# Patient Record
Sex: FEMALE | Race: WHITE | HISPANIC OR LATINO | Employment: FULL TIME | URBAN - METROPOLITAN AREA
[De-identification: names, ages, dates, MRNs, and addresses within clinical notes are randomized per-mention and may not be internally consistent; named-entity substitution may affect disease eponyms.]

---

## 2018-07-12 RX ORDER — ELECTROLYTES/DEXTROSE
1 SOLUTION, ORAL ORAL DAILY
COMMUNITY

## 2018-07-12 RX ORDER — FLUTICASONE PROPIONATE 50 MCG
2 SPRAY, SUSPENSION (ML) NASAL AS NEEDED
COMMUNITY

## 2018-07-13 ENCOUNTER — OFFICE VISIT (OUTPATIENT)
Dept: FAMILY MEDICINE CLINIC | Facility: CLINIC | Age: 38
End: 2018-07-13
Payer: COMMERCIAL

## 2018-07-13 VITALS
SYSTOLIC BLOOD PRESSURE: 108 MMHG | OXYGEN SATURATION: 98 % | RESPIRATION RATE: 18 BRPM | HEIGHT: 65 IN | DIASTOLIC BLOOD PRESSURE: 66 MMHG | WEIGHT: 271.2 LBS | HEART RATE: 78 BPM | TEMPERATURE: 97.6 F | BODY MASS INDEX: 45.18 KG/M2

## 2018-07-13 DIAGNOSIS — I83.93 VARICOSE VEINS OF BOTH LOWER EXTREMITIES: Primary | ICD-10-CM

## 2018-07-13 DIAGNOSIS — Z02.4 DRIVER'S PERMIT PHYSICAL EXAMINATION: ICD-10-CM

## 2018-07-13 DIAGNOSIS — I83.90 VARICOSE VEIN OF LEG: ICD-10-CM

## 2018-07-13 PROCEDURE — 99213 OFFICE O/P EST LOW 20 MIN: CPT | Performed by: FAMILY MEDICINE

## 2018-07-13 NOTE — PATIENT INSTRUCTIONS
I completed her 's physical exam and I completed the 's form for her  Referral to vascular for consultation about her varicose veins  She goes to the gym regularly and is trying to lose weight

## 2018-07-13 NOTE — PROGRESS NOTES
Assessment/Plan:    Varicose veins of both lower extremities  Long term issue -- wants referral to Vascular doctor  Diagnoses and all orders for this visit:    Varicose veins of both lower extremities    's permit physical examination    Varicose vein of leg    Other orders  -     fluticasone (FLONASE ALLERGY RELIEF) 50 mcg/act nasal spray; 2 sprays into each nostril 2 (two) times a day  -     Multiple Vitamins-Minerals (MULTIVITAMIN ADULT) TABS; Take 1 tablet by mouth daily          Subjective:     Chief Complaint   Patient presents with    Physical Exam    Varicose Veins        Patient ID: Gara Crigler is a 42 y o  female  HPI    The following portions of the patient's history were reviewed and updated as appropriate: allergies, current medications, past family history, past medical history, past social history, past surgical history and problem list     Review of Systems   Constitutional: Negative for activity change, appetite change, fatigue, fever and unexpected weight change  HENT: Negative for congestion, dental problem and sneezing  Eyes: Negative for discharge and visual disturbance  Respiratory: Negative for cough and wheezing  Gastrointestinal: Negative for abdominal pain, constipation, diarrhea, nausea and vomiting  Endocrine: Negative for polydipsia and polyuria  Genitourinary: Negative for dysuria and frequency  Musculoskeletal: Negative for arthralgias  Skin: Negative for rash  Allergic/Immunologic: Negative for environmental allergies and food allergies  Neurological: Negative for headaches  Hematological: Negative for adenopathy  Psychiatric/Behavioral: Negative for behavioral problems and sleep disturbance           Objective:  Vitals:    07/13/18 0732   BP: 108/66   BP Location: Left arm   Patient Position: Sitting   Cuff Size: Large   Pulse: 78   Resp: 18   Temp: 97 6 °F (36 4 °C)   TempSrc: Temporal   SpO2: 98%   Weight: 123 kg (271 lb 3 2 oz)   Height: 5' 5" (1 651 m)      Physical Exam   Constitutional: She is oriented to person, place, and time  She appears well-developed and well-nourished  HENT:   Head: Normocephalic  Right Ear: External ear normal    Left Ear: External ear normal    Nose: Nose normal    Eyes: Conjunctivae are normal  Pupils are equal, round, and reactive to light  Right eye exhibits no discharge  Left eye exhibits no discharge  Neck: Normal range of motion  Neck supple  No thyromegaly present  Cardiovascular: Normal rate, regular rhythm and normal heart sounds  No murmur heard  Pulmonary/Chest: Effort normal and breath sounds normal    Abdominal: Soft  Bowel sounds are normal  There is no tenderness  Musculoskeletal: Normal range of motion  Lymphadenopathy:     She has no cervical adenopathy  Neurological: She is alert and oriented to person, place, and time  Skin: Skin is warm  No rash noted  Psychiatric: She has a normal mood and affect   Her behavior is normal

## 2018-08-21 NOTE — ASSESSMENT & PLAN NOTE
Longstanding hx of BLE symptomatic varicose veins, L >R  -recommend 3 month trial of conservative measures to include daily use of compression stockings, LE elevation, low-sodium diet, aerobic exercise and skin moisturization  -JESSICA in 3 months  -return to office with surgeon with B[facility] Molina in 3 months for reassessment and discussion of surgical options  -instructed to contact the office in the interim with any questions, concerns or change in symptoms

## 2018-08-22 ENCOUNTER — CONSULT (OUTPATIENT)
Dept: VASCULAR SURGERY | Facility: CLINIC | Age: 38
End: 2018-08-22
Payer: COMMERCIAL

## 2018-08-22 VITALS
SYSTOLIC BLOOD PRESSURE: 120 MMHG | BODY MASS INDEX: 43.99 KG/M2 | HEIGHT: 65 IN | DIASTOLIC BLOOD PRESSURE: 74 MMHG | TEMPERATURE: 98 F | HEART RATE: 60 BPM | WEIGHT: 264 LBS

## 2018-08-22 DIAGNOSIS — I83.93 VARICOSE VEINS OF BOTH LOWER EXTREMITIES: Primary | ICD-10-CM

## 2018-08-22 DIAGNOSIS — E66.01 CLASS 3 SEVERE OBESITY DUE TO EXCESS CALORIES WITHOUT SERIOUS COMORBIDITY WITH BODY MASS INDEX (BMI) OF 40.0 TO 44.9 IN ADULT (HCC): ICD-10-CM

## 2018-08-22 DIAGNOSIS — I83.90 VARICOSE VEIN OF LEG: ICD-10-CM

## 2018-08-22 PROCEDURE — 99244 OFF/OP CNSLTJ NEW/EST MOD 40: CPT | Performed by: PHYSICIAN ASSISTANT

## 2018-08-22 RX ORDER — LORATADINE 10 MG/1
TABLET ORAL
COMMUNITY

## 2018-08-22 NOTE — LETTER
August 22, 2018     Syeda Ignacio MD  [address] [address]    Patient: Liliya Dunaway   YOB: 1980   Date of Visit: 8/22/2018       Dear Dr Andrew Rajput:    Thank you for referring Liliya Dunaway to me for evaluation  Below are my notes for this consultation  If you have questions, please do not hesitate to call me  I look forward to following your patient along with you  Sincerely,        Sherita Galeano PA-C        CC: No Recipients  Sherita Galeano [place]  8/22/2018 11:26 AM  Cosign Needed  Varicose veins of both lower extremities  Longstanding hx of BLE symptomatic varicose veins, L >R  -recommend 3 month trial of conservative measures to include daily use of compression stockings, LE elevation, low-sodium diet, aerobic exercise and skin moisturization  -LEVDR in 3 months  -return to office with surgeon with Eliza Morales in 3 months for reassessment and discussion of surgical options  -instructed to contact the office in the interim with any questions, concerns or change in symptoms    Obesity  S/p gastric bypass surgery  -continue weight loss management to facilitate treatment of varicose veins      Assessment/Plan   Diagnoses and all orders for this visit:    Varicose veins of both lower extremities  -     VAS reflux lower limb venous duplex study with reflux assessment, complete bilateral; Future  -     Compression Stocking    Varicose vein of leg  -     Ambulatory referral to Vascular Surgery    Class 3 severe obesity due to excess calories without serious comorbidity with body mass index (BMI) of 40 0 to 44 9 in [facility])    Other orders  -     loratadine (CLARITIN) 10 mg tablet; Take by mouth        Chief Complaint   Patient presents with    Varicose Veins       Subjective  " I am here for varicose veins "    Patient ID: Liliya Dunaway is a 42 y o  female  The patient is new to our practice and was referred by Syeda Ignacio MD  She has had no testing   Patient complains of pain in legs along with bulging veins, aching, tiredness, heaviness and burning  She also complains of itching and swelling in her legs  Left leg is worse than right and has been experiencing this for about 21 years  She exercises and elevates legs but does not wear compression stockings  She denies any DVT history or bleeding veins  76-year-old female with a history morbid obesity, s/p Aurora-en-Y gastric bypass and longstanding symptomatic BLE varicose veins who is referred to the office by her PCP for evaluation related to her varicose veins  Patient complains of progressive bilateral lower extremity varicosities since the age of 23  She complains of bilateral lower extremity dull achy pain, heaviness and edema, L >R  Patient denies history of PE, DVT, superficial thrombophlebitis, recurrent lower extremity cellulitis, venous ulcerations, venous stasis dermatitis, bleeding varicosities or hypercoagulable disorder  She reports a family history of varicose veins in her mother with associated phlebitis but denies family history of DVT, PE or hypercoagulable disorder  The patient works remotely from home and sits for prolonged periods of time which exacerbates her symptoms  Her symptoms are also exacerbated by standing for prolonged periods of time  The patient does not wear compression stockings on a regular basis  She attempted to use compression in the past but discontinued due significant posterior knee pain  The following portions of the patient's history were reviewed and updated as appropriate: allergies, current medications, past family history, past medical history, past social history, past surgical history and problem list     Review of Systems   Constitutional: Positive for chills  Negative for fatigue and fever  HENT: Negative  Eyes: Negative  Respiratory: Negative for chest tightness and shortness of breath  Cardiovascular: Positive for leg swelling   Negative for chest pain         Painful veins   Gastrointestinal: Negative  Endocrine: Negative  Genitourinary: Negative  Musculoskeletal: Negative for back pain, neck pain and neck stiffness  Skin: Negative for rash and wound  Allergic/Immunologic: Negative  Neurological: Negative  Hematological: Negative  Psychiatric/Behavioral: Negative  Patient Active Problem List   Diagnosis    Umbilical hernia    Varicose veins of both lower extremities    Varicose vein of leg    Obesity       Past Surgical History:   Procedure Laterality Date    COLONOSCOPY  02/2012    GASTRIC BYPASS  2012    GASTRIC BYPASS      MORBID OBESITY   ROU-EN-Y GASTRIC BY PASS WITH 150 CM    HERNIA REPAIR      OTHER SURGICAL HISTORY  11/2007    PLANTAR FASCITIS       Family History   Problem Relation Age of Onset    Hypertension Mother     Diabetes Father     Hypertension Father     Coronary artery disease Father         PREMATURE    Colon cancer Maternal Aunt     Breast cancer Paternal Aunt     Diabetes Family         NOT SPECIFIED    Diabetes Family         NOT SPECIFIED       Social History     Social History    Marital status: /Civil Union     Spouse name: N/A    Number of children: N/A    Years of education: N/A     Occupational History    Not on file       Social History Main Topics    Smoking status: Former Smoker     Quit date: 7/1/2004    Smokeless tobacco: Never Used      Comment: USED CHANTIX TO QUIT SMOKING 2003    Alcohol use Yes      Comment: social    Drug use: No    Sexual activity: Not on file     Other Topics Concern    Not on file     Social History Narrative    [employer] NEVER SMOKER       Allergies   Allergen Reactions    Amoxicillin Rash         Current Outpatient Prescriptions:     fluticasone (FLONASE ALLERGY RELIEF) 50 mcg/act nasal spray, 2 sprays into each nostril 2 (two) times a day, Disp: , Rfl:     loratadine (CLARITIN) 10 mg tablet, Take by mouth, Disp: , Rfl:    Multiple Vitamins-Minerals (MULTIVITAMIN ADULT) TABS, Take 1 tablet by mouth daily, Disp: , Rfl:     Objective     Imaging studies:  No vascular imaging studies for review    Physical Exam:    General appearance: alert and oriented, in no acute distress  Skin: Skin color, texture, turgor normal  No rashes or lesions  Neurologic: Grossly normal  Head: Normocephalic, without obvious abnormality, atraumatic  Eyes: PERRL, EOMI, sclerae nonicteric  Throat: lips, mucosa, and tongue normal; teeth and gums normal  Neck: no adenopathy, no carotid bruit, no JVD, supple, symmetrical, trachea midline and thyroid not enlarged, symmetric, no tenderness/mass/nodules  Back: symmetric, no curvature  ROM normal  No CVA tenderness  Lungs: clear to auscultation bilaterally  Chest wall: no tenderness  Heart: regular rate and rhythm, S1, S2 normal, no murmur, click, rub or gallop  Abdomen: soft, non-tender; bowel sounds normal; no masses,  no organomegaly  Extremities: extremities normal, warm and well-perfused; no cyanosis, clubbing, or edema, varicose veins noted and Multiple, diffuse reticular and truncal varicosities noted bilateral lower extremity, particularly posterior calf and medial distal thigh    No evidence of venous ulceration, hemosiderin staining, stasis dermatitis or cellulitis    Pulse exam:  Radial: Right: 2+ Left[de-identified] 2+  DP: Right: 2+ Left: 2+  PT: Right: 2+ Left: 2+

## 2018-08-22 NOTE — PROGRESS NOTES
Varicose veins of both lower extremities  Longstanding hx of BLE symptomatic varicose veins, L >R  -recommend 3 month trial of conservative measures to include daily use of compression stockings, LE elevation, low-sodium diet, aerobic exercise and skin moisturization  -LEVDR in 3 months  -return to office with surgeon with Romina Potts in 3 months for reassessment and discussion of surgical options  -instructed to contact the office in the interim with any questions, concerns or change in symptoms    Obesity  S/p gastric bypass surgery  -continue weight loss management to facilitate treatment of varicose veins      Assessment/Plan   Diagnoses and all orders for this visit:    Varicose veins of both lower extremities  -     VAS reflux lower limb venous duplex study with reflux assessment, complete bilateral; Future  -     Compression Stocking    Varicose vein of leg  -     Ambulatory referral to Vascular Surgery    Class 3 severe obesity due to excess calories without serious comorbidity with body mass index (BMI) of 40 0 to 44 9 in [facility])    Other orders  -     loratadine (CLARITIN) 10 mg tablet; Take by mouth        Chief Complaint   Patient presents with    Varicose Veins       Subjective  " I am here for varicose veins "    Patient ID: Mica Robles is a 42 y o  female  The patient is new to our practice and was referred by Miguelina Koroma MD  She has had no testing  Patient complains of pain in legs along with bulging veins, aching, tiredness, heaviness and burning  She also complains of itching and swelling in her legs  Left leg is worse than right and has been experiencing this for about 21 years  She exercises and elevates legs but does not wear compression stockings  She denies any DVT history or bleeding veins       38-year-old female with a history morbid obesity, s/p Aurora-en-Y gastric bypass and longstanding symptomatic BLE varicose veins who is referred to the office by her PCP for evaluation related to her varicose veins  Patient complains of progressive bilateral lower extremity varicosities since the age of 23  She complains of bilateral lower extremity dull achy pain, heaviness and edema, L >R  Patient denies history of PE, DVT, superficial thrombophlebitis, recurrent lower extremity cellulitis, venous ulcerations, venous stasis dermatitis, bleeding varicosities or hypercoagulable disorder  She reports a family history of varicose veins in her mother with associated phlebitis but denies family history of DVT, PE or hypercoagulable disorder  The patient works remotely from home and sits for prolonged periods of time which exacerbates her symptoms  Her symptoms are also exacerbated by standing for prolonged periods of time  The patient does not wear compression stockings on a regular basis  She attempted to use compression in the past but discontinued due significant posterior knee pain  The following portions of the patient's history were reviewed and updated as appropriate: allergies, current medications, past family history, past medical history, past social history, past surgical history and problem list     Review of Systems   Constitutional: Positive for chills  Negative for fatigue and fever  HENT: Negative  Eyes: Negative  Respiratory: Negative for chest tightness and shortness of breath  Cardiovascular: Positive for leg swelling  Negative for chest pain  Painful veins   Gastrointestinal: Negative  Endocrine: Negative  Genitourinary: Negative  Musculoskeletal: Negative for back pain, neck pain and neck stiffness  Skin: Negative for rash and wound  Allergic/Immunologic: Negative  Neurological: Negative  Hematological: Negative  Psychiatric/Behavioral: Negative          Patient Active Problem List   Diagnosis    Umbilical hernia    Varicose veins of both lower extremities    Varicose vein of leg    Obesity       Past Surgical History: Procedure Laterality Date    COLONOSCOPY  02/2012    GASTRIC BYPASS  2012    GASTRIC BYPASS      MORBID OBESITY   ROU-EN-Y GASTRIC BY PASS WITH 150 CM    HERNIA REPAIR      OTHER SURGICAL HISTORY  11/2007    PLANTAR FASCITIS       Family History   Problem Relation Age of Onset    Hypertension Mother     Diabetes Father     Hypertension Father     Coronary artery disease Father         PREMATURE    Colon cancer Maternal Aunt     Breast cancer Paternal Aunt     Diabetes Family         NOT SPECIFIED    Diabetes Family         NOT SPECIFIED       Social History     Social History    Marital status: /Civil Union     Spouse name: N/A    Number of children: N/A    Years of education: N/A     Occupational History    Not on file       Social History Main Topics    Smoking status: Former Smoker     Quit date: 7/1/2004    Smokeless tobacco: Never Used      Comment: USED CHANTIX TO QUIT SMOKING 2003    Alcohol use Yes      Comment: social    Drug use: No    Sexual activity: Not on file     Other Topics Concern    Not on file     Social History Narrative    [employer] NEVER SMOKER       Allergies   Allergen Reactions    Amoxicillin Rash         Current Outpatient Prescriptions:     fluticasone (FLONASE ALLERGY RELIEF) 50 mcg/act nasal spray, 2 sprays into each nostril 2 (two) times a day, Disp: , Rfl:     loratadine (CLARITIN) 10 mg tablet, Take by mouth, Disp: , Rfl:     Multiple Vitamins-Minerals (MULTIVITAMIN ADULT) TABS, Take 1 tablet by mouth daily, Disp: , Rfl:     Objective     Imaging studies:  No vascular imaging studies for review    Physical Exam:    General appearance: alert and oriented, in no acute distress  Skin: Skin color, texture, turgor normal  No rashes or lesions  Neurologic: Grossly normal  Head: Normocephalic, without obvious abnormality, atraumatic  Eyes: PERRL, EOMI, sclerae nonicteric  Throat: lips, mucosa, and tongue normal; teeth and gums normal  Neck: no adenopathy, no carotid bruit, no JVD, supple, symmetrical, trachea midline and thyroid not enlarged, symmetric, no tenderness/mass/nodules  Back: symmetric, no curvature  ROM normal  No CVA tenderness  Lungs: clear to auscultation bilaterally  Chest wall: no tenderness  Heart: regular rate and rhythm, S1, S2 normal, no murmur, click, rub or gallop  Abdomen: soft, non-tender; bowel sounds normal; no masses,  no organomegaly  Extremities: extremities normal, warm and well-perfused; no cyanosis, clubbing, or edema, varicose veins noted and Multiple, diffuse reticular and truncal varicosities noted bilateral lower extremity, particularly posterior calf and medial distal thigh    No evidence of venous ulceration, hemosiderin staining, stasis dermatitis or cellulitis    Pulse exam:  Radial: Right: 2+ Left[de-identified] 2+  DP: Right: 2+ Left: 2+  PT: Right: 2+ Left: 2+

## 2018-08-22 NOTE — PATIENT INSTRUCTIONS
Varicose Veins   WHAT YOU NEED TO KNOW:   What are varicose veins? Varicose veins are veins that become large, twisted, and swollen  They are common on the back of the calves, knees, and thighs  Varicose veins are caused by valves in your veins that do not work properly  This causes blood to collect and increase pressure in the veins of your legs  The increased pressure causes your veins to stretch, get larger, swell, and twist        What increases my risk for varicose veins? · Pregnancy    · A family history of varicose veins    · Being overweight or obese    · Age 48 years or older    · Sitting or standing for long periods of time    · Wearing tight clothing  What are the signs and symptoms of varicose veins? Your symptoms may be worse after you stand or sit for long periods of time  You may have any of the following:  · Blue, purple, or bulging veins in your legs     · Pain, swelling, or muscle cramps in your legs    · Feeling of fatigue or heaviness in your legs  How are varicose veins diagnosed? Your healthcare provider will examine your legs and ask about your medical history  You may need tests, such as a Doppler ultrasound or duplex scan  These tests show your veins and valves, and how your blood is flowing through them  These tests may also show if there is a blockage or blood clot  How are varicose veins treated? The goal of treatment is to decrease symptoms, improve appearance, and prevent further problems  Treatment will depend on which veins are affected and how severe your condition is  You may need procedures to treat or remove your varicose veins  For example, your healthcare provider may inject a solution or use a laser to close the varicose veins  Surgery to remove long veins may also be done  Ask your healthcare provider for more information about procedures used to treat varicose veins  What can I do to manage my symptoms? · Do not sit or stand for long periods of time    This can cause the blood to collect in your legs and make your symptoms worse  Bend or rotate your ankles several times every hour  Walk around for a few minutes every hour to get blood moving in your legs  · Do not cross your legs when you sit  This decreases blood flow to your feet and can make your symptoms worse  · Do not wear tight clothing or shoes  Do not wear high-heeled shoes  Do not wear clothes that are tight around the waist or knees  · Maintain a healthy weight  Being overweight or obese can make your varicose veins worse  Ask your healthcare provider how much you should weigh  Ask him or her to help you create a weight loss plan if you are overweight  · Wear pressure stockings as directed  The stockings are tight and put pressure on your legs  They improve blood flow and help prevent clots  · Elevate your legs  Keep them above the level of your heart for 15 to 30 minutes several times a day  You can also prop the end of your bed up slightly to elevate your legs while you sleep  This will help blood to flow back to your heart  · Get regular exercise  Talk to your healthcare provider about the best exercise plan for you  Exercise can improve blood flow to your legs and feet  When should I seek immediate care? · You have a wound that does not heal or is infected  · You have an injury that has broken your skin and caused your varicose veins to bleed  · Your leg is swollen and hard  · You notice that your legs or feet are turning blue or black  · Your leg feels warm, tender, and painful  It may look swollen and red  When should I contact my healthcare provider? · You have pain in your leg that does not go away or gets worse  · You notice sudden large bruising on your legs  · You have a rash on your leg  · Your symptoms keep you from doing your daily activities  · You have questions or concerns about your condition or care    CARE AGREEMENT:   You have the right to help plan your care  Learn about your health condition and how it may be treated  Discuss treatment options with your caregivers to decide what care you want to receive  You always have the right to refuse treatment  The above information is an  only  It is not intended as medical advice for individual conditions or treatments  Talk to your doctor, nurse or pharmacist before following any medical regimen to see if it is safe and effective for you  © 2017 [employer]  Information is for End User's use only and may not be sold, redistributed or otherwise used for commercial purposes  All illustrations and images included in CareNotes® are the copyrighted property of [employer]  or [employer]      -recommend 3 month trial conservative measures to include daily use of compression stockings, intermittent lower extremity elevation, low-sodium diet, aerobic activity, weight management and skin moisturization  -place your compression stockings on in the morning upon awakening and removed prior to bedtime  -we will schedule you for a lower extremity venous reflux study in 3 months to assess for venous incompetence  -return to office in 3 months after reflux study for reassessment and discussion of surgical options  -please contact the office in the interim with questions, concerns or change in symptoms

## 2018-08-22 NOTE — ASSESSMENT & PLAN NOTE
S/p gastric bypass surgery  -continue weight loss management to facilitate treatment of varicose veins

## 2018-09-18 ENCOUNTER — OFFICE VISIT (OUTPATIENT)
Dept: FAMILY MEDICINE CLINIC | Facility: CLINIC | Age: 38
End: 2018-09-18
Payer: COMMERCIAL

## 2018-09-18 VITALS
RESPIRATION RATE: 18 BRPM | WEIGHT: 264.7 LBS | DIASTOLIC BLOOD PRESSURE: 80 MMHG | HEIGHT: 65 IN | HEART RATE: 60 BPM | TEMPERATURE: 99.3 F | BODY MASS INDEX: 44.1 KG/M2 | SYSTOLIC BLOOD PRESSURE: 110 MMHG

## 2018-09-18 DIAGNOSIS — H65.02 ACUTE SEROUS OTITIS MEDIA OF LEFT EAR, RECURRENCE NOT SPECIFIED: Primary | ICD-10-CM

## 2018-09-18 DIAGNOSIS — J30.9 ALLERGIC RHINITIS, UNSPECIFIED SEASONALITY, UNSPECIFIED TRIGGER: ICD-10-CM

## 2018-09-18 PROCEDURE — 99214 OFFICE O/P EST MOD 30 MIN: CPT | Performed by: NURSE PRACTITIONER

## 2018-09-18 PROCEDURE — 3008F BODY MASS INDEX DOCD: CPT | Performed by: NURSE PRACTITIONER

## 2018-09-18 RX ORDER — ALBUTEROL SULFATE 90 UG/1
2 AEROSOL, METERED RESPIRATORY (INHALATION) EVERY 6 HOURS PRN
Qty: 8.5 G | Refills: 3 | Status: SHIPPED | OUTPATIENT
Start: 2018-09-18 | End: 2021-07-21 | Stop reason: SDUPTHER

## 2018-09-18 RX ORDER — MONTELUKAST SODIUM 10 MG/1
10 TABLET ORAL
Qty: 30 TABLET | Refills: 3 | Status: SHIPPED | OUTPATIENT
Start: 2018-09-18 | End: 2021-08-25 | Stop reason: ALTCHOICE

## 2018-09-18 RX ORDER — AZITHROMYCIN 250 MG/1
TABLET, FILM COATED ORAL
Qty: 6 TABLET | Refills: 0 | Status: SHIPPED | OUTPATIENT
Start: 2018-09-18 | End: 2018-09-22

## 2018-09-18 NOTE — PROGRESS NOTES
Assessment/Plan:    No problem-specific Assessment & Plan notes found for this encounter  Diagnoses and all orders for this visit:    Acute serous otitis media of left ear, recurrence not specified  -     azithromycin (ZITHROMAX) 250 mg tablet; Take 2 tablets today then 1 tablet daily x 4 days    Allergic rhinitis, unspecified seasonality, unspecified trigger  -     montelukast (SINGULAIR) 10 mg tablet; Take 1 tablet (10 mg total) by mouth daily at bedtime  -     albuterol (PROAIR HFA) 90 mcg/act inhaler; Inhale 2 puffs every 6 (six) hours as needed for wheezing          Subjective:      Patient ID: Medardo Barros is a 42 y o  female  HPI      Started about 2 weeks ago  Head congestion and cough  Using OTC and helps but wears off  Started with low grade fevers past few days  Having ear pressure and into neck along with the cough  Does have allergies  Using meds and not controlled    Occasional tightness  The following portions of the patient's history were reviewed and updated as appropriate: allergies, current medications, past family history, past medical history, past social history, past surgical history and problem list     Review of Systems   Constitutional: Positive for fever  Negative for activity change, appetite change, chills and fatigue  HENT: Positive for congestion  Negative for ear pain, sinus pain, sinus pressure, sneezing and sore throat  Respiratory: Positive for cough  Negative for wheezing  Cardiovascular: Negative for chest pain  Gastrointestinal: Negative for constipation and diarrhea  Neurological: Negative for dizziness, light-headedness and headaches           Objective:  Vitals:    09/18/18 1116   BP: 110/80   BP Location: Left arm   Patient Position: Sitting   Cuff Size: Large   Pulse: 60   Resp: 18   Temp: 99 3 °F (37 4 °C)   TempSrc: Oral   Weight: 120 kg (264 lb 11 2 oz)   Height: 5' 5" (1 651 m)      Physical Exam   Constitutional: She is oriented to person, place, and time  She appears well-developed and well-nourished  HENT:   Head: Normocephalic  Right Ear: External ear normal    Left Ear: External ear normal    Nose: Nose normal    Eyes: Conjunctivae are normal  Pupils are equal, round, and reactive to light  Right eye exhibits no discharge  Left eye exhibits no discharge  Neck: Normal range of motion  Neck supple  No thyromegaly present  Cardiovascular: Normal rate, regular rhythm and normal heart sounds  Pulmonary/Chest: Breath sounds normal    Musculoskeletal: Normal range of motion  Lymphadenopathy:     She has no cervical adenopathy  Neurological: She is alert and oriented to person, place, and time  Skin: Skin is warm and dry  Psychiatric: She has a normal mood and affect  Her behavior is normal    Vitals reviewed

## 2018-11-21 ENCOUNTER — HOSPITAL ENCOUNTER (OUTPATIENT)
Dept: NON INVASIVE DIAGNOSTICS | Facility: CLINIC | Age: 38
Discharge: HOME | End: 2018-11-21
Payer: COMMERCIAL

## 2018-11-21 DIAGNOSIS — I83.93 VARICOSE VEINS OF BOTH LOWER EXTREMITIES: ICD-10-CM

## 2018-11-21 PROCEDURE — 93970 EXTREMITY STUDY: CPT

## 2018-11-21 PROCEDURE — 93971 EXTREMITY STUDY: CPT | Performed by: SURGERY

## 2018-11-28 ENCOUNTER — OFFICE VISIT (OUTPATIENT)
Dept: VASCULAR SURGERY | Facility: CLINIC | Age: 38
End: 2018-11-28
Payer: COMMERCIAL

## 2018-11-28 VITALS
BODY MASS INDEX: 41.82 KG/M2 | HEIGHT: 65 IN | WEIGHT: 251 LBS | DIASTOLIC BLOOD PRESSURE: 62 MMHG | TEMPERATURE: 98.4 F | SYSTOLIC BLOOD PRESSURE: 124 MMHG

## 2018-11-28 DIAGNOSIS — I83.893 SYMPTOMATIC VARICOSE VEINS OF BOTH LOWER EXTREMITIES: Primary | ICD-10-CM

## 2018-11-28 DIAGNOSIS — E66.01 CLASS 3 SEVERE OBESITY DUE TO EXCESS CALORIES WITHOUT SERIOUS COMORBIDITY WITH BODY MASS INDEX (BMI) OF 40.0 TO 44.9 IN ADULT (HCC): ICD-10-CM

## 2018-11-28 PROCEDURE — 99214 OFFICE O/P EST MOD 30 MIN: CPT | Performed by: PHYSICIAN ASSISTANT

## 2018-11-28 NOTE — PROGRESS NOTES
Symptomatic varicose veins of both lower extremities  66-year-old female with a history of obesity, status post gastric bypass surgery and Longstanding hx of BLE symptomatic varicose veins, L >R  Symptoms improved with use of compression stockings  LEVDR demonstrates no evidence of deep or superficial venous incompetence  -recommend continuation of conservative measures to include daily use of compression stockings, LE elevation, low-sodium diet, aerobic exercise and skin moisturization  -return to office PRN  -instructed to contact the office in the future with any questions, concerns, change in symptoms or recurrent thrombophlebitis  -all questions answered and patient agrees with treatment plan  Obesity  S/p gastric bypass surgery  -continue weight loss management to facilitate treatment of varicose veins      Assessment/Plan   Diagnoses and all orders for this visit:    Symptomatic varicose veins of both lower extremities    Class 3 severe obesity due to excess calories without serious comorbidity with body mass index (BMI) of 40 0 to 44 9 in [facility])        No chief complaint on file  Subjective   Patient ID: Tayler Perez is a 42 y o  female  Chief complaint: Pt is here to review LEV done 11/21/18  Pt has VV to bilateral legs  Pt c/o bulging, achy, tired, heavy and burning veins  Pt states they are itchy and swelling  L>R  Pt is wearing RX compresssion Stockings daily  Pt is elevating  Pt denies open wounds or sores  Pt denies HX of DVT  41-year-old female with a history morbid obesity, s/p [facility]-en-Y gastric bypass and longstanding symptomatic BLE varicose veins who was referred to the office by her PCP for evaluation related to her varicose veins and returns to office today for re-evaluation and review of recent [facility]  Patient complains of progressive bilateral lower extremity varicosities since the age of 23    She complains of bilateral lower extremity dull achy pain, heaviness and edema, L >R   Patient denies history of PE, DVT, superficial thrombophlebitis, recurrent lower extremity cellulitis, venous ulcerations, venous stasis dermatitis, bleeding varicosities or hypercoagulable disorder  She reports a family history of varicose veins in her mother with associated phlebitis but denies family history of DVT, PE or hypercoagulable disorder  The patient has been wearing compression stockings for the last 3 months and has noted improvement in her symptoms  LEVDR 11/21/2018 has been reviewed and demonstrates no evidence of deep or superficial venous incompetence  The following portions of the patient's history were reviewed and updated as appropriate: allergies, current medications, past family history, past medical history, past social history, past surgical history and problem list     Review of Systems   Constitutional: Negative  HENT: Negative  Eyes: Negative  Respiratory: Negative  Cardiovascular:        Painful veins   Gastrointestinal: Negative  Endocrine: Negative  Genitourinary: Negative  Musculoskeletal: Negative  Leg pain   Skin: Positive for color change  Allergic/Immunologic: Negative  Neurological: Negative  Hematological: Negative  Psychiatric/Behavioral: Negative  I have personally reviewed the ROS entered by MA and agree as documented      Patient Active Problem List   Diagnosis    Umbilical hernia    Symptomatic varicose veins of both lower extremities    Varicose veins    Obesity    Anxiety    Back pain    Gastroesophageal reflux disease    Allergic rhinitis       Past Surgical History:   Procedure Laterality Date    COLONOSCOPY  02/2012    ESOPHAGOGASTRODUODENOSCOPY  02/2012    GASTRIC BYPASS  05/23/2012    Morbid Obesity    HERNIA REPAIR      OTHER SURGICAL HISTORY  11/2007    PLANTAR FASCITIS       Family History   Problem Relation Age of Onset    Hypertension Mother     Diabetes Father         Mellitus    Hypertension Father     Coronary artery disease Father 36        Premature    Colon cancer Maternal Aunt     Breast cancer Paternal Aunt     Diabetes Family         Melitus    Diabetes Family         Mellitus       Social History     Social History    Marital status: /Civil Union     Spouse name: N/A    Number of children: N/A    Years of education: N/A     Occupational History    Not on file  Social History Main Topics    Smoking status: Former Smoker     Quit date: 7/1/2004    Smokeless tobacco: Never Used      Comment: USED CHANTIX TO QUIT SMOKING 2003  Per [employer] 10/4/16 Never a smoker    Alcohol use Yes      Comment: social    Drug use: No    Sexual activity: Not on file     Other Topics Concern    Not on file     Social History Narrative    [employer] NEVER SMOKER       Allergies   Allergen Reactions    Amoxicillin Rash         Current Outpatient Prescriptions:     albuterol (PROAIR HFA) 90 mcg/act inhaler, Inhale 2 puffs every 6 (six) hours as needed for wheezing, Disp: 8 5 g, Rfl: 3    fluticasone (FLONASE ALLERGY RELIEF) 50 mcg/act nasal spray, 2 sprays into each nostril 2 (two) times a day, Disp: , Rfl:     loratadine (CLARITIN) 10 mg tablet, Take by mouth, Disp: , Rfl:     montelukast (SINGULAIR) 10 mg tablet, Take 1 tablet (10 mg total) by mouth daily at bedtime, Disp: 30 tablet, Rfl: 3    Multiple Vitamins-Minerals (MULTIVITAMIN ADULT) TABS, Take 1 tablet by mouth daily, Disp: , Rfl:     Objective      Imaging studies:  LEVDR 11/21/2018:  Imaging study reviewed and as described above    See full report below:  FINDINGS:     Segment         Right                    Left                              Impression       AP(mm)  Impression       AP(mm)    GSV Inguinal                        7 4                      7 0    GSV Prox Thigh                      5 2                      5 4    GSV Mid Thigh                       4 2                      3 8    GSV Dist Thigh 3 0                      3 6    CFV             Normal (Patent)          Normal (Patent)            GSV Knee                            3 1                      3 8    GSV Mid Calf                        3 0                      2 2    GSV Ankle                           3 0                      3 2    SSV Mid Calf                        2 2                      2 9    SSV Knee                            3 0                      3 1    SSV Ankle                           1 3                      2 5       CONCLUSION:  Impression:  RIGHT LIMB:  No evidence of deep venous incompetence  The great saphenous vein is competent  The great saphenous vein remains within the saphenous compartment in the thigh,  with branches emanating from the proximal and mid portions  The small saphenous vein is competent and does not communicate with the  popliteal vein  The small saphenous vein confluences with multiple varicose veins in the distal  posterior calf  There is no evidence of incompetent perforators in the thigh or calf  There is no evidence of deep vein thrombosis in the CFV, the proximal PFV, the  femoral vein and the popliteal vein  LEFT LIMB:  No evidence of deep venous incompetence  The great saphenous vein is competent  The great saphenous vein remains within the saphenous compartment in the thigh,  with branches emanating from the proximal portion  The small saphenous vein is competent and does not communicate with the  popliteal vein  There is no evidence of incompetent perforators in the thigh or calf  There is no evidence of deep vein thrombosis in the CFV, the proximal PFV, the  femoral vein and the popliteal vein  Physical Exam:    General appearance: alert and oriented, in no acute distress    obese  Skin: Skin color, texture, turgor normal  No rashes or lesions  Neurologic: Grossly normal  Head: Normocephalic, without obvious abnormality, atraumatic  Eyes: PERRL, EOMI, sclerae nonicteric  Throat: lips, mucosa, and tongue normal; teeth and gums normal  Neck: no adenopathy, no carotid bruit, no JVD, supple, symmetrical, trachea midline and thyroid not enlarged, symmetric, no tenderness/mass/nodules  Back: symmetric, no curvature  ROM normal  No CVA tenderness  Lungs: clear to auscultation bilaterally  Chest wall: no tenderness  Heart: regular rate and rhythm, S1, S2 normal, no murmur, click, rub or gallop  Abdomen: soft, non-tender; bowel sounds normal; no masses,  no organomegaly  Extremities: extremities normal, warm and well-perfused; no cyanosis, clubbing, or edema, varicose veins noted and Reticular and truncal varicosities the medial and posterior calves, R>L  No evidence of venous ulceration, hemosiderin staining or open wounds      Pulse exam:  Radial: Right: 2+ Left[de-identified] 2+  DP: Right: 2+ Left: 2+  PT: Right: 1+ Left: 1+

## 2018-11-28 NOTE — LETTER
November 28, 2018     Rubi Nicole, [facility] [address] [address]    Patient: Zhen Alcantara   YOB: 1980   Date of Visit: 11/28/2018       Dear Dr Sanjuana Alejandro: Thank you for referring Zhen Alcantara to me for evaluation  Below are my notes for this consultation  If you have questions, please do not hesitate to call me  I look forward to following your patient along with you  Sincerely,        Neville Patton DO        CC: No Recipients  Arun Berumen PA-C  11/28/2018  3:56 PM  Sign at close encounter  Symptomatic varicose veins of both lower extremities  66-year-old female with a history of obesity, status post gastric bypass surgery and Longstanding hx of BLE symptomatic varicose veins, L >R  Symptoms improved with use of compression stockings  LEVDR demonstrates no evidence of deep or superficial venous incompetence  -recommend continuation of conservative measures to include daily use of compression stockings, LE elevation, low-sodium diet, aerobic exercise and skin moisturization  -return to office PRN  -instructed to contact the office in the future with any questions, concerns, change in symptoms or recurrent thrombophlebitis  -all questions answered and patient agrees with treatment plan  Obesity  S/p gastric bypass surgery  -continue weight loss management to facilitate treatment of varicose veins      Assessment/Plan   Diagnoses and all orders for this visit:    Symptomatic varicose veins of both lower extremities    Class 3 severe obesity due to excess calories without serious comorbidity with body mass index (BMI) of 40 0 to 44 9 in [facility])        No chief complaint on file  Subjective   Patient ID: Zhen Alcantara is a 42 y o  female  Chief complaint: Pt is here to review LEV done 11/21/18  Pt has VV to bilateral legs  Pt c/o bulging, achy, tired, heavy and burning veins  Pt states they are itchy and swelling  L>R   Pt is wearing RX compresssion Stockings daily  Pt is elevating  Pt denies open wounds or sores  Pt denies HX of DVT  40-year-old female with a history morbid obesity, s/p [facility]-en-Y gastric bypass and longstanding symptomatic BLE varicose veins who was referred to the office by her PCP for evaluation related to her varicose veins and returns to office today for re-evaluation and review of recent [facility]  Patient complains of progressive bilateral lower extremity varicosities since the age of 23  She complains of bilateral lower extremity dull achy pain, heaviness and edema, L >R  Patient denies history of PE, DVT, superficial thrombophlebitis, recurrent lower extremity cellulitis, venous ulcerations, venous stasis dermatitis, bleeding varicosities or hypercoagulable disorder  She reports a family history of varicose veins in her mother with associated phlebitis but denies family history of DVT, PE or hypercoagulable disorder  The patient has been wearing compression stockings for the last 3 months and has noted improvement in her symptoms  LEVDR 11/21/2018 has been reviewed and demonstrates no evidence of deep or superficial venous incompetence  The following portions of the patient's history were reviewed and updated as appropriate: allergies, current medications, past family history, past medical history, past social history, past surgical history and problem list     Review of Systems   Constitutional: Negative  HENT: Negative  Eyes: Negative  Respiratory: Negative  Cardiovascular:        Painful veins   Gastrointestinal: Negative  Endocrine: Negative  Genitourinary: Negative  Musculoskeletal: Negative  Leg pain   Skin: Positive for color change  Allergic/Immunologic: Negative  Neurological: Negative  Hematological: Negative  Psychiatric/Behavioral: Negative  I have personally reviewed the ROS entered by MA and agree as documented      Patient Active Problem List   Diagnosis    Umbilical hernia    Symptomatic varicose veins of both lower extremities    Varicose veins    Obesity    Anxiety    Back pain    Gastroesophageal reflux disease    Allergic rhinitis       Past Surgical History:   Procedure Laterality Date    COLONOSCOPY  02/2012    ESOPHAGOGASTRODUODENOSCOPY  02/2012    GASTRIC BYPASS  05/23/2012    Morbid Obesity    HERNIA REPAIR      OTHER SURGICAL HISTORY  11/2007    PLANTAR FASCITIS       Family History   Problem Relation Age of Onset    Hypertension Mother     Diabetes Father         Mellitus    Hypertension Father     Coronary artery disease Father 36        Premature    Colon cancer Maternal Aunt     Breast cancer Paternal Aunt     Diabetes Family         Melitus    Diabetes Family         Mellitus       Social History     Social History    Marital status: /Civil Union     Spouse name: N/A    Number of children: N/A    Years of education: N/A     Occupational History    Not on file  Social History Main Topics    Smoking status: Former Smoker     Quit date: 7/1/2004    Smokeless tobacco: Never Used      Comment: USED CHANTIX TO QUIT SMOKING 2003   Per [employer] 10/4/16 Never a smoker    Alcohol use Yes      Comment: social    Drug use: No    Sexual activity: Not on file     Other Topics Concern    Not on file     Social History Narrative    [employer] NEVER SMOKER       Allergies   Allergen Reactions    Amoxicillin Rash         Current Outpatient Prescriptions:     albuterol (PROAIR HFA) 90 mcg/act inhaler, Inhale 2 puffs every 6 (six) hours as needed for wheezing, Disp: 8 5 g, Rfl: 3    fluticasone (FLONASE ALLERGY RELIEF) 50 mcg/act nasal spray, 2 sprays into each nostril 2 (two) times a day, Disp: , Rfl:     loratadine (CLARITIN) 10 mg tablet, Take by mouth, Disp: , Rfl:     montelukast (SINGULAIR) 10 mg tablet, Take 1 tablet (10 mg total) by mouth daily at bedtime, Disp: 30 tablet, Rfl: 3    Multiple Vitamins-Minerals (MULTIVITAMIN ADULT) TABS, Take 1 tablet by mouth daily, Disp: , Rfl:     Objective      Imaging studies:  LEVDR 11/21/2018:  Imaging study reviewed and as described above  See full report below:  FINDINGS:     Segment         Right                    Left                              Impression       AP(mm)  Impression       AP(mm)    GSV Inguinal                        7 4                      7 0    GSV Prox Thigh                      5 2                      5 4    GSV Mid Thigh                       4 2                      3 8    GSV Dist Thigh                      3 0                      3 6    CFV             Normal (Patent)          Normal (Patent)            GSV Knee                            3 1                      3 8    GSV Mid Calf                        3 0                      2 2    GSV Ankle                           3 0                      3 2    SSV Mid Calf                        2 2                      2 9    SSV Knee                            3 0                      3 1    SSV Ankle                           1 3                      2 5       CONCLUSION:  Impression:  RIGHT LIMB:  No evidence of deep venous incompetence  The great saphenous vein is competent  The great saphenous vein remains within the saphenous compartment in the thigh,  with branches emanating from the proximal and mid portions  The small saphenous vein is competent and does not communicate with the  popliteal vein  The small saphenous vein confluences with multiple varicose veins in the distal  posterior calf  There is no evidence of incompetent perforators in the thigh or calf  There is no evidence of deep vein thrombosis in the CFV, the proximal PFV, the  femoral vein and the popliteal vein  LEFT LIMB:  No evidence of deep venous incompetence  The great saphenous vein is competent    The great saphenous vein remains within the saphenous compartment in the thigh,  with branches emanating from the proximal portion  The small saphenous vein is competent and does not communicate with the  popliteal vein  There is no evidence of incompetent perforators in the thigh or calf  There is no evidence of deep vein thrombosis in the CFV, the proximal PFV, the  femoral vein and the popliteal vein  Physical Exam:    General appearance: alert and oriented, in no acute distress  obese  Skin: Skin color, texture, turgor normal  No rashes or lesions  Neurologic: Grossly normal  Head: Normocephalic, without obvious abnormality, atraumatic  Eyes: PERRL, EOMI, sclerae nonicteric  Throat: lips, mucosa, and tongue normal; teeth and gums normal  Neck: no adenopathy, no carotid bruit, no JVD, supple, symmetrical, trachea midline and thyroid not enlarged, symmetric, no tenderness/mass/nodules  Back: symmetric, no curvature  ROM normal  No CVA tenderness  Lungs: clear to auscultation bilaterally  Chest wall: no tenderness  Heart: regular rate and rhythm, S1, S2 normal, no murmur, click, rub or gallop  Abdomen: soft, non-tender; bowel sounds normal; no masses,  no organomegaly  Extremities: extremities normal, warm and well-perfused; no cyanosis, clubbing, or edema, varicose veins noted and Reticular and truncal varicosities the medial and posterior calves, R>L  No evidence of venous ulceration, hemosiderin staining or open wounds      Pulse exam:  Radial: Right: 2+ Left[de-identified] 2+  DP: Right: 2+ Left: 2+  PT: Right: 1+ Left: 1+

## 2018-11-28 NOTE — PATIENT INSTRUCTIONS
Varicose Veins   WHAT YOU NEED TO KNOW:   What are varicose veins? Varicose veins are veins that become large, twisted, and swollen  They are common on the back of the calves, knees, and thighs  Varicose veins are caused by valves in your veins that do not work properly  This causes blood to collect and increase pressure in the veins of your legs  The increased pressure causes your veins to stretch, get larger, swell, and twist        What increases my risk for varicose veins? · Pregnancy    · A family history of varicose veins    · Being overweight or obese    · Age 48 years or older    · Sitting or standing for long periods of time    · Wearing tight clothing  What are the signs and symptoms of varicose veins? Your symptoms may be worse after you stand or sit for long periods of time  You may have any of the following:  · Blue, purple, or bulging veins in your legs     · Pain, swelling, or muscle cramps in your legs    · Feeling of fatigue or heaviness in your legs  How are varicose veins diagnosed? Your healthcare provider will examine your legs and ask about your medical history  You may need tests, such as a Doppler ultrasound or duplex scan  These tests show your veins and valves, and how your blood is flowing through them  These tests may also show if there is a blockage or blood clot  How are varicose veins treated? The goal of treatment is to decrease symptoms, improve appearance, and prevent further problems  Treatment will depend on which veins are affected and how severe your condition is  You may need procedures to treat or remove your varicose veins  For example, your healthcare provider may inject a solution or use a laser to close the varicose veins  Surgery to remove long veins may also be done  Ask your healthcare provider for more information about procedures used to treat varicose veins  What can I do to manage my symptoms? · Do not sit or stand for long periods of time    This can cause the blood to collect in your legs and make your symptoms worse  Bend or rotate your ankles several times every hour  Walk around for a few minutes every hour to get blood moving in your legs  · Do not cross your legs when you sit  This decreases blood flow to your feet and can make your symptoms worse  · Do not wear tight clothing or shoes  Do not wear high-heeled shoes  Do not wear clothes that are tight around the waist or knees  · Maintain a healthy weight  Being overweight or obese can make your varicose veins worse  Ask your healthcare provider how much you should weigh  Ask him or her to help you create a weight loss plan if you are overweight  · Wear pressure stockings as directed  The stockings are tight and put pressure on your legs  They improve blood flow and help prevent clots  · Elevate your legs  Keep them above the level of your heart for 15 to 30 minutes several times a day  You can also prop the end of your bed up slightly to elevate your legs while you sleep  This will help blood to flow back to your heart  · Get regular exercise  Talk to your healthcare provider about the best exercise plan for you  Exercise can improve blood flow to your legs and feet  When should I seek immediate care? · You have a wound that does not heal or is infected  · You have an injury that has broken your skin and caused your varicose veins to bleed  · Your leg is swollen and hard  · You notice that your legs or feet are turning blue or black  · Your leg feels warm, tender, and painful  It may look swollen and red  When should I contact my healthcare provider? · You have pain in your leg that does not go away or gets worse  · You notice sudden large bruising on your legs  · You have a rash on your leg  · Your symptoms keep you from doing your daily activities  · You have questions or concerns about your condition or care    CARE AGREEMENT:   You have the right to help plan your care  Learn about your health condition and how it may be treated  Discuss treatment options with your caregivers to decide what care you want to receive  You always have the right to refuse treatment  The above information is an  only  It is not intended as medical advice for individual conditions or treatments  Talk to your doctor, nurse or pharmacist before following any medical regimen to see if it is safe and effective for you  © 2017 [address]  Information is for End User's use only and may not be sold, redistributed or otherwise used for commercial purposes  All illustrations and images included in CareNotes® are the copyrighted property of A D A M , [employer]  or [address]  -your reflux study demonstrates no evidence of venous incompetence  -recommend continuation of conservative measures to include daily use of compression stockings, lower extremity elevation, hydration, skin moisturization, weight management aerobic activity  -recommend wearing compression stockings and hydrating well with any prolonged travel, including upcoming trip to [facility]  -return to office as needed    Please contact the office if you would develop recurrent phlebitis or like to consider stab phlebectomies

## 2018-11-28 NOTE — ASSESSMENT & PLAN NOTE
67-year-old female with a history of obesity, status post gastric bypass surgery and Longstanding hx of BLE symptomatic varicose veins, L >R  Symptoms improved with use of compression stockings  LEVDR demonstrates no evidence of deep or superficial venous incompetence  -recommend continuation of conservative measures to include daily use of compression stockings, LE elevation, low-sodium diet, aerobic exercise and skin moisturization  -return to office PRN  -instructed to contact the office in the future with any questions, concerns, change in symptoms or recurrent thrombophlebitis  -all questions answered and patient agrees with treatment plan

## 2019-05-24 ENCOUNTER — TELEPHONE (OUTPATIENT)
Dept: FAMILY MEDICINE CLINIC | Facility: CLINIC | Age: 39
End: 2019-05-24

## 2019-05-24 ENCOUNTER — OFFICE VISIT (OUTPATIENT)
Dept: URGENT CARE | Age: 39
End: 2019-05-24
Payer: COMMERCIAL

## 2019-05-24 VITALS
WEIGHT: 259 LBS | RESPIRATION RATE: 18 BRPM | OXYGEN SATURATION: 99 % | TEMPERATURE: 98.4 F | DIASTOLIC BLOOD PRESSURE: 78 MMHG | HEART RATE: 59 BPM | SYSTOLIC BLOOD PRESSURE: 138 MMHG | BODY MASS INDEX: 43.15 KG/M2 | HEIGHT: 65 IN

## 2019-05-24 DIAGNOSIS — J01.00 ACUTE NON-RECURRENT MAXILLARY SINUSITIS: ICD-10-CM

## 2019-05-24 DIAGNOSIS — J06.9 ACUTE UPPER RESPIRATORY INFECTION: Primary | ICD-10-CM

## 2019-05-24 PROCEDURE — 99213 OFFICE O/P EST LOW 20 MIN: CPT | Performed by: FAMILY MEDICINE

## 2019-05-24 RX ORDER — AZITHROMYCIN 250 MG/1
TABLET, FILM COATED ORAL
Qty: 6 TABLET | Refills: 0 | Status: SHIPPED | OUTPATIENT
Start: 2019-05-24 | End: 2019-05-28

## 2020-04-15 ENCOUNTER — TELEPHONE (OUTPATIENT)
Dept: FAMILY MEDICINE CLINIC | Facility: CLINIC | Age: 40
End: 2020-04-15

## 2020-11-25 ENCOUNTER — OFFICE VISIT (OUTPATIENT)
Dept: FAMILY MEDICINE CLINIC | Facility: CLINIC | Age: 40
End: 2020-11-25
Payer: COMMERCIAL

## 2020-11-25 VITALS
DIASTOLIC BLOOD PRESSURE: 70 MMHG | BODY MASS INDEX: 50.02 KG/M2 | HEIGHT: 64 IN | OXYGEN SATURATION: 100 % | WEIGHT: 293 LBS | HEART RATE: 72 BPM | TEMPERATURE: 97.5 F | RESPIRATION RATE: 18 BRPM | SYSTOLIC BLOOD PRESSURE: 122 MMHG

## 2020-11-25 DIAGNOSIS — Z00.00 ANNUAL PHYSICAL EXAM: Primary | ICD-10-CM

## 2020-11-25 DIAGNOSIS — E66.01 MORBID OBESITY WITH BMI OF 50.0-59.9, ADULT (HCC): ICD-10-CM

## 2020-11-25 DIAGNOSIS — Z98.84 S/P GASTRIC BYPASS: ICD-10-CM

## 2020-11-25 DIAGNOSIS — Z11.4 SCREENING FOR HIV (HUMAN IMMUNODEFICIENCY VIRUS): ICD-10-CM

## 2020-11-25 DIAGNOSIS — G47.9 SLEEP DISTURBANCES: ICD-10-CM

## 2020-11-25 DIAGNOSIS — Z12.31 ENCOUNTER FOR SCREENING MAMMOGRAM FOR MALIGNANT NEOPLASM OF BREAST: ICD-10-CM

## 2020-11-25 DIAGNOSIS — Z23 IMMUNIZATION DUE: ICD-10-CM

## 2020-11-25 DIAGNOSIS — R06.83 SNORING: ICD-10-CM

## 2020-11-25 PROCEDURE — 90471 IMMUNIZATION ADMIN: CPT

## 2020-11-25 PROCEDURE — 99396 PREV VISIT EST AGE 40-64: CPT | Performed by: NURSE PRACTITIONER

## 2020-11-25 PROCEDURE — 3725F SCREEN DEPRESSION PERFORMED: CPT | Performed by: NURSE PRACTITIONER

## 2020-11-25 PROCEDURE — 1036F TOBACCO NON-USER: CPT | Performed by: NURSE PRACTITIONER

## 2020-11-25 PROCEDURE — 90714 TD VACC NO PRESV 7 YRS+ IM: CPT

## 2020-11-25 PROCEDURE — 3008F BODY MASS INDEX DOCD: CPT | Performed by: NURSE PRACTITIONER

## 2020-11-28 LAB
EXTERNAL HIV SCREEN: NORMAL
HBA1C MFR BLD HPLC: 5.3 %

## 2020-12-08 ENCOUNTER — HOSPITAL ENCOUNTER (OUTPATIENT)
Dept: MAMMOGRAPHY | Facility: MEDICAL CENTER | Age: 40
Discharge: HOME | End: 2020-12-08
Payer: COMMERCIAL

## 2020-12-08 VITALS — HEIGHT: 64 IN | BODY MASS INDEX: 50.02 KG/M2 | WEIGHT: 293 LBS

## 2020-12-08 DIAGNOSIS — Z12.31 ENCOUNTER FOR SCREENING MAMMOGRAM FOR MALIGNANT NEOPLASM OF BREAST: ICD-10-CM

## 2020-12-08 PROCEDURE — 77063 BREAST TOMOSYNTHESIS BI: CPT

## 2020-12-08 PROCEDURE — 77067 SCR MAMMO BI INCL CAD: CPT

## 2020-12-09 ENCOUNTER — ANNUAL EXAM (OUTPATIENT)
Dept: FAMILY MEDICINE CLINIC | Facility: CLINIC | Age: 40
End: 2020-12-09
Payer: COMMERCIAL

## 2020-12-09 VITALS
RESPIRATION RATE: 16 BRPM | OXYGEN SATURATION: 99 % | HEIGHT: 64 IN | WEIGHT: 293 LBS | DIASTOLIC BLOOD PRESSURE: 72 MMHG | SYSTOLIC BLOOD PRESSURE: 110 MMHG | TEMPERATURE: 96.9 F | HEART RATE: 69 BPM | BODY MASS INDEX: 50.02 KG/M2

## 2020-12-09 DIAGNOSIS — Z12.31 ENCOUNTER FOR SCREENING MAMMOGRAM FOR MALIGNANT NEOPLASM OF BREAST: ICD-10-CM

## 2020-12-09 DIAGNOSIS — Z01.419 WELL FEMALE EXAM WITH ROUTINE GYNECOLOGICAL EXAM: Primary | ICD-10-CM

## 2020-12-09 PROCEDURE — 3008F BODY MASS INDEX DOCD: CPT | Performed by: NURSE PRACTITIONER

## 2020-12-09 PROCEDURE — 87624 HPV HI-RISK TYP POOLED RSLT: CPT | Performed by: NURSE PRACTITIONER

## 2020-12-09 PROCEDURE — 99396 PREV VISIT EST AGE 40-64: CPT | Performed by: NURSE PRACTITIONER

## 2020-12-09 PROCEDURE — G0145 SCR C/V CYTO,THINLAYER,RESCR: HCPCS | Performed by: NURSE PRACTITIONER

## 2020-12-09 PROCEDURE — 1036F TOBACCO NON-USER: CPT | Performed by: NURSE PRACTITIONER

## 2020-12-11 LAB
HPV HR 12 DNA CVX QL NAA+PROBE: NEGATIVE
HPV16 DNA CVX QL NAA+PROBE: NEGATIVE
HPV18 DNA CVX QL NAA+PROBE: NEGATIVE

## 2020-12-14 ENCOUNTER — TELEPHONE (OUTPATIENT)
Dept: FAMILY MEDICINE CLINIC | Facility: CLINIC | Age: 40
End: 2020-12-14

## 2020-12-14 LAB
LAB AP GYN PRIMARY INTERPRETATION: NORMAL
Lab: NORMAL

## 2020-12-16 ENCOUNTER — HOSPITAL ENCOUNTER (OUTPATIENT)
Dept: ULTRASOUND IMAGING | Facility: CLINIC | Age: 40
Discharge: HOME | End: 2020-12-16
Payer: COMMERCIAL

## 2020-12-16 ENCOUNTER — HOSPITAL ENCOUNTER (OUTPATIENT)
Dept: MAMMOGRAPHY | Facility: CLINIC | Age: 40
Discharge: HOME | End: 2020-12-16
Payer: COMMERCIAL

## 2020-12-16 DIAGNOSIS — R92.8 ABNORMAL MAMMOGRAM: ICD-10-CM

## 2020-12-16 PROCEDURE — G0279 TOMOSYNTHESIS, MAMMO: HCPCS

## 2020-12-16 PROCEDURE — 76642 ULTRASOUND BREAST LIMITED: CPT

## 2020-12-16 PROCEDURE — 77065 DX MAMMO INCL CAD UNI: CPT

## 2021-01-20 ENCOUNTER — OFFICE VISIT (OUTPATIENT)
Dept: SLEEP CENTER | Facility: CLINIC | Age: 41
End: 2021-01-20
Payer: COMMERCIAL

## 2021-01-20 VITALS
BODY MASS INDEX: 50.96 KG/M2 | HEART RATE: 65 BPM | DIASTOLIC BLOOD PRESSURE: 78 MMHG | HEIGHT: 64 IN | SYSTOLIC BLOOD PRESSURE: 122 MMHG

## 2021-01-20 DIAGNOSIS — G47.9 SLEEP DISTURBANCES: ICD-10-CM

## 2021-01-20 DIAGNOSIS — G47.19 EXCESSIVE DAYTIME SLEEPINESS: ICD-10-CM

## 2021-01-20 DIAGNOSIS — G47.00 INSOMNIA, UNSPECIFIED TYPE: ICD-10-CM

## 2021-01-20 DIAGNOSIS — E66.01 CLASS 3 SEVERE OBESITY DUE TO EXCESS CALORIES WITHOUT SERIOUS COMORBIDITY WITH BODY MASS INDEX (BMI) OF 50.0 TO 59.9 IN ADULT (HCC): ICD-10-CM

## 2021-01-20 DIAGNOSIS — G47.33 OBSTRUCTIVE SLEEP APNEA: Primary | ICD-10-CM

## 2021-01-20 PROCEDURE — 99244 OFF/OP CNSLTJ NEW/EST MOD 40: CPT | Performed by: NURSE PRACTITIONER

## 2021-01-20 NOTE — PATIENT INSTRUCTIONS
1   Schedule home sleep study  2  Schedule set up of CPAP equipment  3  Schedule compliance visit 31-91 days after beginning use of equipment    Nursing Support:  When: Monday through Friday 7A-5PM except holidays  Where: Our direct line is [phone]  If you are having a true emergency please call 911  In the event that the line is busy or it is after hours please leave a voice message and we will return your call  Please speak clearly, leaving your full name, birth date, best number to reach you and the reason for your call  Medication refills: We will need the name of the medication, the dosage, the ordering provider, whether you get a 30 or 90 day refill, and the pharmacy name and address  Medications will be ordered by the provider only  Nurses cannot call in prescriptions  Please allow 7 days for medication refills  Physician requested updates: If your provider requested that you call with an update after starting medication, please be ready to provide us the medication and dosage, what time you take your medication, the time you attempt to fall asleep, time you fall asleep, when you wake up, and what time you get out of bed  Sleep Study Results: We will contact you with sleep study results and/or next steps after the physician has reviewed your testing

## 2021-01-20 NOTE — PROGRESS NOTES
Consultation - [id number], 1980, MRN: [id number]    1/20/2021        Reason for Consult / Principal Problem:  Evaluation of possible Obstructive Sleep Apnea  Excessive daytime sleepiness  Insomnia - sleep maintenance  Morbid Obesity       Thank you for the opportunity of participating in the evaluation and care of this patient in the Sleep Clinic at [facility]  Subjective:     HPI: Tara Pham is a 41y o  year old female  She presents for a consultation regarding concern of possible obstructive sleep apnea  For the past 5 years, she has been waking herself several times per night, due to snoring, choking and gasping for air  She feels tired throughout the day, despite sleeping for 8-9 hours per night  She has difficulty maintaining wakefulness at times during the day, especially between 11:30am and 2:30pm   She is unable to maintain wakefulness in the evening while watching TV or reading  Her comorbid conditions include morbid obesity, GERD and anxiety, currently controlled without the use of medication      Review of Systems      Genitourinary hot flashes at night and sleep problems that vary with menstrual cycle    Cardiology none   Gastrointestinal none   Neurology muscle weakness, poor concentration or confusion,  and difficulty with memory   Constitutional weight change   Integumentary itching   Psychiatry anxiety   Musculoskeletal joint pain and leg cramps   Pulmonary shortness of breath with activity and snoring   ENT throat clearing and ringing in ears   Endocrine none   Hematological none       Employment:  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, working M-F between the hours of 9:00am and 6:00pm    Sleep Schedule:       Bedtime:  10:00pm      Latency:  Approximately 30 minutes      Wakeup time:  6:45am on work days and 7:30am on weekends, with the use of an alarm    Awakenings: Frequency: At least 2-3 times every night      Causes:  Snoring, choking, gasping for air or other unknown causes      Duration:  Sometimes returns to sleep easily and sometimes is awake for hours  She will read on her phone or just lay in bed  She has not used any medication to help with sleep initiation  Daytime Sleepiness / Inappropriate Sleep:       Most severe:  She may become sleepy by 11:30am until 2:30pm       Naps :  She does not intentionally take naps      Inappropriate drowsiness / sleep:  By 7:00pm, when she sits down to relax, she is unable to stay awake to watch TV or read     Snoring:  She snores with coughing, choking and gasping for air    Apnea: No witnessed apnea    Change in Weight:  She has gained 40 lbs over the past 1 5 years    Restless Leg Syndrome:  no clinical symptoms consistent with this diagnosis     Other Complaints:  Occasional sleep talking or screaming during sleep with no acting out of dreams  No reports of sleep walking  She has had episodes of sleep paralysis that began in her teens  Most recent episode was in 2009  No reports of bruxism but she clenches and has some jaw discomfort  She may awaken with headaches 2 times per month on average  Social History:      Caffeine:  24 ounces of coffee daily       Tobacco:   reports that she quit smoking about 16 years ago  She has never used smokeless tobacco      E-cig/Vaping:    E-Cigarette/Vaping    E-Cigarette Use Never User       E-Cigarette/Vaping Substances    Nicotine No     THC No     CBD No     Flavoring No     Other No     Unknown No          Alcohol:   reports current alcohol use  social      Drugs:   reports no history of drug use         The review of systems and following portions of the patient's history were reviewed and updated as appropriate: allergies, current medications, past family history, past medical history, past social history, past surgical history and problem list         Objective:       Vitals:    01/20/21 1412   BP: 122/78   Pulse: 65   Height: 5' 4" (1 626 m)     Body mass index is 50 96 kg/m²  Neck Circumference: 14 5  Shannon Sleepiness Scale: Total score: 13      Current Outpatient Medications:     albuterol (PROAIR HFA) 90 mcg/act inhaler, Inhale 2 puffs every 6 (six) hours as needed for wheezing (Patient taking differently: Inhale 2 puffs as needed for wheezing ), Disp: 8 5 g, Rfl: 3    fluticasone (FLONASE ALLERGY RELIEF) 50 mcg/act nasal spray, 2 sprays into each nostril as needed , Disp: , Rfl:     loratadine (CLARITIN) 10 mg tablet, Take by mouth, Disp: , Rfl:     Multiple Vitamins-Minerals (MULTIVITAMIN ADULT) TABS, Take 1 tablet by mouth daily, Disp: , Rfl:     montelukast (SINGULAIR) 10 mg tablet, Take 1 tablet (10 mg total) by mouth daily at bedtime (Patient not taking: Reported on 1/20/2021), Disp: 30 tablet, Rfl: 3    Physical Exam  General Appearance:   Alert, cooperative, no distress, appears stated age, morbidly obese     Head:   Normocephalic, without obvious abnormality, atraumatic     Eyes:   PERRL, conjunctiva/corneas clear, EOM's intact          Nose:  Nares normal, septum midline, mucosa normal, no drainage or sinus tenderness           Throat:  Lips, teeth and gums normal; tongue normal size and  shape and midline in position; mucosa moist with mild redundancy and narrow oropharyngeal opening, uvula normal, tonsils not visualized, Mallampati class 3       Neck:  Supple, symmetrical, trachea midline, no adenopathy;  Thyroid: No enlargement, tenderness or nodules; no carotid bruit or JVD     Lungs:      Clear to auscultation bilaterally, respirations unlabored     Heart:   Regular rate and rhythm, S1 and S2 normal, no murmur, rub or gallop       Extremities:  Extremities normal, atraumatic, no cyanosis and trace edema in LE bilaterally       Skin:  Skin color, texture, turgor normal, no rashes or lesions       Neurologic:  No focal deficits noted  Normal strength, sensation throughout     Sleep Study Results:  No prior sleep study      ASSESSMENT / PLAN     1  Obstructive sleep apnea  Ambulatory referral to Sleep Medicine    Home Study   2  Sleep disturbances  Ambulatory referral to Sleep Medicine    Home Study   3  Excessive daytime sleepiness  Home Study   4  Insomnia, unspecified type  Home Study   5  Class 3 severe obesity due to excess calories without serious comorbidity with body mass index (BMI) of 50 0 to 59 9 in adult [facility])           Counseling / Coordination of Care  Total clinic time spent today 55 minutes  Greater than 50% of total time was spent with the patient and / or family counseling and / or coordination of care  A description of the counseling / coordination of care:     diagnostic results, instructions for management, risk factor reductions, prognosis, patient and family education, impressions, risks and benefits of treatment options and importance of compliance with treatment    Today we discussed the anatomy and physiology of the upper airway  I pointed out how changes in this region can result in both snoring and abnormal breathing events including apneas and hypopneas  I explained the most common co-morbidities of untreated sleep apnea  After this we talked about some forms of treatment including weight loss, application of positive airway pressure, mandibular advancement devices and surgery  In order to evaluate the possibility of Obstructive Sleep Apnea as a cause of the patient's symptoms, a home sleep study will be completed to identify the presence or absence of abnormal nocturnal breathing  If significant abnormal nocturnal breathing is detected, nasal CPAP will be titrated to find the optimum pressure needed to maintain upper airway patency during sleep  This may be accomplished using APAP or may require an in lab titration study    Following testing, the patient will return to [facility] for [facility] equipment, followed by a compliance follow up visit 31-91 days later  The following instructions have been given to the patient today:    Patient Instructions   1  Schedule home sleep study  2  Schedule set up of CPAP equipment  3  Schedule compliance visit 31-91 days after beginning use of equipment    Nursing Support:  When: Monday through Friday 7A-5PM except holidays  Where: Our direct line is [phone]  If you are having a true emergency please call 911  In the event that the line is busy or it is after hours please leave a voice message and we will return your call  Please speak clearly, leaving your full name, birth date, best number to reach you and the reason for your call  Medication refills: We will need the name of the medication, the dosage, the ordering provider, whether you get a 30 or 90 day refill, and the pharmacy name and address  Medications will be ordered by the provider only  Nurses cannot call in prescriptions  Please allow 7 days for medication refills  Physician requested updates: If your provider requested that you call with an update after starting medication, please be ready to provide us the medication and dosage, what time you take your medication, the time you attempt to fall asleep, time you fall asleep, when you wake up, and what time you get out of bed  Sleep Study Results: We will contact you with sleep study results and/or next steps after the physician has reviewed your testing        Isidro Hernandez, [address] [place] [address]

## 2021-01-27 ENCOUNTER — OFFICE VISIT (OUTPATIENT)
Dept: FAMILY MEDICINE CLINIC | Facility: CLINIC | Age: 41
End: 2021-01-27
Payer: COMMERCIAL

## 2021-01-27 VITALS
RESPIRATION RATE: 18 BRPM | TEMPERATURE: 98 F | SYSTOLIC BLOOD PRESSURE: 126 MMHG | OXYGEN SATURATION: 100 % | BODY MASS INDEX: 50.02 KG/M2 | WEIGHT: 293 LBS | DIASTOLIC BLOOD PRESSURE: 82 MMHG | HEART RATE: 85 BPM | HEIGHT: 64 IN

## 2021-01-27 DIAGNOSIS — G89.29 CHRONIC LEFT SHOULDER PAIN: ICD-10-CM

## 2021-01-27 DIAGNOSIS — M62.838 MUSCLE SPASM: Primary | ICD-10-CM

## 2021-01-27 DIAGNOSIS — M54.2 NECK PAIN: ICD-10-CM

## 2021-01-27 DIAGNOSIS — M25.512 CHRONIC LEFT SHOULDER PAIN: ICD-10-CM

## 2021-01-27 PROCEDURE — 1036F TOBACCO NON-USER: CPT | Performed by: NURSE PRACTITIONER

## 2021-01-27 PROCEDURE — 99213 OFFICE O/P EST LOW 20 MIN: CPT | Performed by: NURSE PRACTITIONER

## 2021-01-27 PROCEDURE — 3008F BODY MASS INDEX DOCD: CPT | Performed by: NURSE PRACTITIONER

## 2021-01-27 RX ORDER — CYCLOBENZAPRINE HCL 5 MG
5 TABLET ORAL
Qty: 20 TABLET | Refills: 0 | Status: SHIPPED | OUTPATIENT
Start: 2021-01-27

## 2021-01-27 NOTE — PATIENT INSTRUCTIONS
Neck Exercises   WHAT YOU NEED TO KNOW:   Why is it important to do neck exercises? Neck exercises help reduce neck pain, and improve neck movement and strength  Neck exercises also help prevent long-term neck problems  What do I need to know about neck exercises? · Do the exercises every day,  or as often as directed by your healthcare provider  · Move slowly, gently, and smoothly  Avoid fast or jerky motions  · Stand and sit the way your healthcare provider shows you  Good posture may reduce your neck pain  Check your posture often, even when you are not doing your neck exercises  How do I perform neck exercises safely? · Exercise position:  You may sit or stand while you do neck exercises  Face forward  Your shoulders should be straight and relaxed, with a good posture  · Head tilts, forward and back:  Gently bow your head and try to touch your chin to your chest  Your healthcare provider may tell you to push on the back of your neck to help bow your head  Raise your chin back to the starting position  Tilt your head back as far as possible so you are looking up at the ceiling  Your healthcare provider may tell you to lift your chin to help tilt your head back  Return your head to the starting position  · Head tilts, side to side:  Tilt your head, bringing your ear toward your shoulder  Then tilt your head toward the other shoulder  · Head turns:  Turn your head to look over your shoulder  Tilt your chin down and try to touch it to your shoulder  Do not raise your shoulder to your chin  Face forward again  Do the same on the other side  · Head rolls:  Slowly bring your chin toward your chest  Next, roll your head to the right  Your ear should be positioned over your shoulder  Hold this position for 5 seconds  Roll your head back toward your chest and to the left into the same position  Hold for 5 seconds   Gently roll your head back and around in a clockwise [place] 3 times  Next, move your head in the reverse direction (counterclockwise) in a [place] 3 times  Do not shrug your shoulders upwards while you do this exercise  When should I contact my healthcare provider? · Your pain does not get better, or gets worse  · You have questions or concerns about your condition, care, or exercise program     CARE AGREEMENT:   You have the right to help plan your care  Learn about your health condition and how it may be treated  Discuss treatment options with your healthcare providers to decide what care you want to receive  You always have the right to refuse treatment  The above information is an  only  It is not intended as medical advice for individual conditions or treatments  Talk to your doctor, nurse or pharmacist before following any medical regimen to see if it is safe and effective for you  © Copyright [address] Information is for End User's use only and may not be sold, redistributed or otherwise used for commercial purposes   All illustrations and images included in CareNotes® are the copyrighted property of A D A M , Inc  or [address] [place] [address]

## 2021-01-27 NOTE — PROGRESS NOTES
Assessment/Plan:         Diagnoses and all orders for this visit:    Muscle spasm  -     cyclobenzaprine (FLEXERIL) 5 mg tablet; Take 1 tablet (5 mg total) by mouth daily at bedtime as needed for muscle spasms  -     Diclofenac Sodium (VOLTAREN) 1 %; Apply 2 g topically 4 (four) times a day    Neck pain  -     cyclobenzaprine (FLEXERIL) 5 mg tablet; Take 1 tablet (5 mg total) by mouth daily at bedtime as needed for muscle spasms  -     Diclofenac Sodium (VOLTAREN) 1 %; Apply 2 g topically 4 (four) times a day    Chronic left shoulder pain  -     cyclobenzaprine (FLEXERIL) 5 mg tablet; Take 1 tablet (5 mg total) by mouth daily at bedtime as needed for muscle spasms  -     Diclofenac Sodium (VOLTAREN) 1 %; Apply 2 g topically 4 (four) times a day          Subjective:      Patient ID: Rosita Vazquez is a 41 y o  female  HPI  Started about 5 days ago    Not getting better  Not improving   Stretching and using heat    Getting massage from  to work out spasm  Left upper shoulder into neck and back of head  No injury  does think may be work related    ON computer 9 hrs a day and using phone more     Not sleeping well    Feels spasms   Can only take tylenol  The following portions of the patient's history were reviewed and updated as appropriate: allergies, current medications, past family history, past medical history, past social history, past surgical history and problem list     Review of Systems   Constitutional: Negative for activity change, appetite change, diaphoresis and fatigue  Genitourinary: Negative for urgency  Musculoskeletal: Positive for neck pain and neck stiffness  Neurological: Negative for dizziness, light-headedness, numbness and headaches  Objective:  Vitals:    01/27/21 1546   BP: 126/82   Pulse: 85   Resp: 18   Temp: 98 °F (36 7 °C)   TempSrc: Temporal   SpO2: 100%   Weight: 134 kg (295 lb)   Height: 5' 4" (1 626 m)      Physical Exam  Vitals signs reviewed  Constitutional:       Appearance: Normal appearance  She is obese  Musculoskeletal:      Cervical back: She exhibits tenderness, pain and spasm  Back:    Neurological:      Mental Status: She is alert  Patient Instructions     Neck Exercises   WHAT YOU NEED TO KNOW:   Why is it important to do neck exercises? Neck exercises help reduce neck pain, and improve neck movement and strength  Neck exercises also help prevent long-term neck problems  What do I need to know about neck exercises? · Do the exercises every day,  or as often as directed by your healthcare provider  · Move slowly, gently, and smoothly  Avoid fast or jerky motions  · Stand and sit the way your healthcare provider shows you  Good posture may reduce your neck pain  Check your posture often, even when you are not doing your neck exercises  How do I perform neck exercises safely? · Exercise position:  You may sit or stand while you do neck exercises  Face forward  Your shoulders should be straight and relaxed, with a good posture  · Head tilts, forward and back:  Gently bow your head and try to touch your chin to your chest  Your healthcare provider may tell you to push on the back of your neck to help bow your head  Raise your chin back to the starting position  Tilt your head back as far as possible so you are looking up at the ceiling  Your healthcare provider may tell you to lift your chin to help tilt your head back  Return your head to the starting position  · Head tilts, side to side:  Tilt your head, bringing your ear toward your shoulder  Then tilt your head toward the other shoulder  · Head turns:  Turn your head to look over your shoulder  Tilt your chin down and try to touch it to your shoulder  Do not raise your shoulder to your chin  Face forward again  Do the same on the other side           · Head rolls:  Slowly bring your chin toward your chest  Next, roll your head to the right  Your ear should be positioned over your shoulder  Hold this position for 5 seconds  Roll your head back toward your chest and to the left into the same position  Hold for 5 seconds  Gently roll your head back and around in a clockwise [place] 3 times  Next, move your head in the reverse direction (counterclockwise) in a [place] 3 times  Do not shrug your shoulders upwards while you do this exercise  When should I contact my healthcare provider? · Your pain does not get better, or gets worse  · You have questions or concerns about your condition, care, or exercise program     CARE AGREEMENT:   You have the right to help plan your care  Learn about your health condition and how it may be treated  Discuss treatment options with your healthcare providers to decide what care you want to receive  You always have the right to refuse treatment  The above information is an  only  It is not intended as medical advice for individual conditions or treatments  Talk to your doctor, nurse or pharmacist before following any medical regimen to see if it is safe and effective for you  © Copyright [address] Information is for End User's use only and may not be sold, redistributed or otherwise used for commercial purposes   All illustrations and images included in CareNotes® are the copyrighted property of [employer]  or [address] [place] [address]

## 2021-03-18 ENCOUNTER — HOSPITAL ENCOUNTER (OUTPATIENT)
Dept: SLEEP CENTER | Facility: CLINIC | Age: 41
Discharge: HOME | End: 2021-03-18
Payer: COMMERCIAL

## 2021-03-18 DIAGNOSIS — G47.33 OBSTRUCTIVE SLEEP APNEA: ICD-10-CM

## 2021-03-18 DIAGNOSIS — G47.00 INSOMNIA, UNSPECIFIED TYPE: ICD-10-CM

## 2021-03-18 DIAGNOSIS — G47.19 EXCESSIVE DAYTIME SLEEPINESS: ICD-10-CM

## 2021-03-18 DIAGNOSIS — G47.9 SLEEP DISTURBANCES: ICD-10-CM

## 2021-03-18 PROCEDURE — G0399 HOME SLEEP TEST/TYPE 3 PORTA: HCPCS

## 2021-03-18 PROCEDURE — G0399 HOME SLEEP TEST/TYPE 3 PORTA: HCPCS | Performed by: INTERNAL MEDICINE

## 2021-03-19 NOTE — PROGRESS NOTES
Home Sleep Study Documentation    Pre-Sleep Home Study:    Set-up and instructions performed by: KRZYSZTOF Woodruff, CRT    Technician performed demonstration for Patient: yes    Return demonstration performed by Patient: yes    Written instructions provided to Patient: yes    Patient signed consent form: yes        Post-Sleep Home Study:    Additional comments by Patient: none    Home Sleep Study Failed:no:    Failure reason: N/A    Reported or Detected: N/A    Scored by: KAMILA Dooley, KEYGT

## 2021-03-23 ENCOUNTER — TELEPHONE (OUTPATIENT)
Dept: SLEEP CENTER | Facility: CLINIC | Age: 41
End: 2021-03-23

## 2021-03-23 NOTE — TELEPHONE ENCOUNTER
----- Message from LienAffinio Batch, [address] Leigha Ott sent at 3/23/2021 12:06 PM EDT -----  No sleep apnea identified on home sleep study  Home sleep studies can be understated  Due to patient's results, which suggest EVELIA, it is recommended that she undergo an in lab diagnostic sleep study  If she is agreeable, order will be placed and testing can be scheduled, otherwise, patient to follow up to further discuss results

## 2021-03-29 DIAGNOSIS — G47.19 EXCESSIVE DAYTIME SLEEPINESS: ICD-10-CM

## 2021-03-29 DIAGNOSIS — G47.00 INSOMNIA, UNSPECIFIED TYPE: ICD-10-CM

## 2021-03-29 DIAGNOSIS — G47.33 OBSTRUCTIVE SLEEP APNEA: Primary | ICD-10-CM

## 2021-03-29 DIAGNOSIS — G47.9 SLEEP DISTURBANCES: ICD-10-CM

## 2021-03-29 DIAGNOSIS — E66.01 CLASS 3 SEVERE OBESITY DUE TO EXCESS CALORIES WITHOUT SERIOUS COMORBIDITY WITH BODY MASS INDEX (BMI) OF 50.0 TO 59.9 IN ADULT (HCC): ICD-10-CM

## 2021-03-29 NOTE — TELEPHONE ENCOUNTER
Discussed with patient recommendation for in lab diagnostic sleep study  Patient is agreeable      Please order diagnostic study

## 2021-03-29 NOTE — PROGRESS NOTES
Patient with ongoing symptoms concerning for obstructive sleep apnea  Home sleep study did not confirm sleep apnea  Diagnostic sleep study ordered for further evaluation

## 2021-03-30 ENCOUNTER — TELEPHONE (OUTPATIENT)
Dept: SLEEP CENTER | Facility: CLINIC | Age: 41
End: 2021-03-30

## 2021-03-30 DIAGNOSIS — Z23 ENCOUNTER FOR IMMUNIZATION: ICD-10-CM

## 2021-03-30 NOTE — TELEPHONE ENCOUNTER
Left call back message for the patient to call and schedule in lab diagnostic sleep study   Also left number for central scheduling

## 2021-04-03 ENCOUNTER — IMMUNIZATIONS (OUTPATIENT)
Dept: FAMILY MEDICINE CLINIC | Facility: HOSPITAL | Age: 41
End: 2021-04-03

## 2021-04-03 DIAGNOSIS — Z23 ENCOUNTER FOR IMMUNIZATION: Primary | ICD-10-CM

## 2021-04-03 PROCEDURE — 91300 SARS-COV-2 / COVID-19 MRNA VACCINE (PFIZER-BIONTECH) 30 MCG: CPT

## 2021-04-03 PROCEDURE — 0001A SARS-COV-2 / COVID-19 MRNA VACCINE (PFIZER-BIONTECH) 30 MCG: CPT

## 2021-05-20 ENCOUNTER — IMMUNIZATIONS (OUTPATIENT)
Dept: FAMILY MEDICINE CLINIC | Facility: HOSPITAL | Age: 41
End: 2021-05-20

## 2021-05-20 DIAGNOSIS — Z23 ENCOUNTER FOR IMMUNIZATION: Primary | ICD-10-CM

## 2021-05-20 PROCEDURE — 0002A SARS-COV-2 / COVID-19 MRNA VACCINE (PFIZER-BIONTECH) 30 MCG: CPT

## 2021-05-20 PROCEDURE — 91300 SARS-COV-2 / COVID-19 MRNA VACCINE (PFIZER-BIONTECH) 30 MCG: CPT

## 2021-07-21 ENCOUNTER — OFFICE VISIT (OUTPATIENT)
Dept: FAMILY MEDICINE CLINIC | Facility: CLINIC | Age: 41
End: 2021-07-21
Payer: COMMERCIAL

## 2021-07-21 VITALS
WEIGHT: 285 LBS | DIASTOLIC BLOOD PRESSURE: 84 MMHG | SYSTOLIC BLOOD PRESSURE: 120 MMHG | OXYGEN SATURATION: 99 % | RESPIRATION RATE: 18 BRPM | TEMPERATURE: 98 F | HEART RATE: 84 BPM | BODY MASS INDEX: 48.92 KG/M2

## 2021-07-21 DIAGNOSIS — K29.70 GASTRITIS WITHOUT BLEEDING, UNSPECIFIED CHRONICITY, UNSPECIFIED GASTRITIS TYPE: Primary | ICD-10-CM

## 2021-07-21 DIAGNOSIS — J30.9 ALLERGIC RHINITIS, UNSPECIFIED SEASONALITY, UNSPECIFIED TRIGGER: ICD-10-CM

## 2021-07-21 DIAGNOSIS — L65.9 HAIR LOSS: ICD-10-CM

## 2021-07-21 DIAGNOSIS — R06.9 BREATHING PROBLEM: ICD-10-CM

## 2021-07-21 PROCEDURE — 99214 OFFICE O/P EST MOD 30 MIN: CPT | Performed by: NURSE PRACTITIONER

## 2021-07-21 PROCEDURE — 1036F TOBACCO NON-USER: CPT | Performed by: NURSE PRACTITIONER

## 2021-07-21 PROCEDURE — 3725F SCREEN DEPRESSION PERFORMED: CPT | Performed by: NURSE PRACTITIONER

## 2021-07-21 RX ORDER — ALBUTEROL SULFATE 90 UG/1
2 AEROSOL, METERED RESPIRATORY (INHALATION) EVERY 6 HOURS PRN
Qty: 8.5 G | Refills: 3 | Status: SHIPPED | OUTPATIENT
Start: 2021-07-21

## 2021-07-21 RX ORDER — ESOMEPRAZOLE MAGNESIUM 40 MG/1
40 CAPSULE, DELAYED RELEASE ORAL
Qty: 30 CAPSULE | Refills: 5 | Status: SHIPPED | OUTPATIENT
Start: 2021-07-21 | End: 2021-08-25 | Stop reason: SDUPTHER

## 2021-07-21 NOTE — PROGRESS NOTES
Assessment/Plan:         Diagnoses and all orders for this visit:    Gastritis without bleeding, unspecified chronicity, unspecified gastritis type  -     esomeprazole (NexIUM) 40 MG capsule; Take 1 capsule (40 mg total) by mouth daily before breakfast    Allergic rhinitis, unspecified seasonality, unspecified trigger  -     albuterol (ProAir HFA) 90 mcg/act inhaler; Inhale 2 puffs every 6 (six) hours as needed for wheezing    Hair loss  -     Comprehensive metabolic panel; Future  -     TSH, 3rd generation with Free T4 reflex; Future  -     CBC and differential; Future  -     Vitamin B12; Future  -     Ferritin; Future  -     Magnesium; Future  -     Vitamin B1 (Thiamine), Serum/Plasma, LC/MS/MS; Future    Breathing problem  -     XR chest pa & lateral; Future          Subjective:      Patient ID: Medardo Barros is a 41 y o  female  HPI  4/15 emergency surgery small bowel obstruction   Thought more than that and had open surgery  Discharged and back in a few days and had abscess with fevers and readmitted    Had wound vac for 3 months and nursing 3 times a week  Only small hole present    States since mid June has pinching pain in lung area and they also say they heard it in lungs  When laying on side and used flexeril to see if muscle  Past 2 - 3 weeks pain and pressure and gastritis issues   Was scoped in past and on omeprazole and caused and issue    Nexium was ok       Increasing hair loss   Very stressed and 2 recent deaths and her recent surgery  The following portions of the patient's history were reviewed and updated as appropriate: allergies, current medications, past family history, past medical history, past social history, past surgical history and problem list     Review of Systems   Constitutional: Negative for activity change, appetite change, chills, fatigue and fever  HENT: Negative for ear pain and sore throat  Eyes: Negative for pain and visual disturbance     Respiratory: Positive for chest tightness  Negative for cough and shortness of breath  Cardiovascular: Negative for chest pain and palpitations  Gastrointestinal: Negative for abdominal pain, constipation, diarrhea, nausea and vomiting  Genitourinary: Negative for dysuria, hematuria and urgency  Musculoskeletal: Negative for arthralgias and back pain  Skin: Negative for color change and rash  Neurological: Negative for seizures and syncope  Psychiatric/Behavioral: The patient is not nervous/anxious  All other systems reviewed and are negative  Objective:  Vitals:    07/21/21 1321   BP: 120/84   BP Location: Left arm   Patient Position: Sitting   Cuff Size: Large   Pulse: 84   Resp: 18   Temp: 98 °F (36 7 °C)   TempSrc: Temporal   SpO2: 99%   Weight: 129 kg (285 lb)      Physical Exam  Vitals reviewed  Constitutional:       Appearance: Normal appearance  She is well-developed  She is obese  HENT:      Head: Normocephalic  Right Ear: Tympanic membrane, ear canal and external ear normal       Left Ear: Tympanic membrane, ear canal and external ear normal    Eyes:      General:         Right eye: No discharge  Left eye: No discharge  Conjunctiva/sclera: Conjunctivae normal       Pupils: Pupils are equal, round, and reactive to light  Neck:      Thyroid: No thyromegaly  Cardiovascular:      Rate and Rhythm: Normal rate and regular rhythm  Heart sounds: Normal heart sounds  No murmur heard  Pulmonary:      Effort: Pulmonary effort is normal       Breath sounds: Normal breath sounds  Abdominal:      General: Bowel sounds are normal       Palpations: Abdomen is soft  Tenderness: There is no abdominal tenderness  Musculoskeletal:         General: Normal range of motion  Cervical back: Normal range of motion and neck supple  Lymphadenopathy:      Cervical: No cervical adenopathy  Skin:     General: Skin is warm  Findings: No rash     Neurological: Mental Status: She is alert and oriented to person, place, and time  Psychiatric:         Mood and Affect: Mood normal          Behavior: Behavior normal          Thought Content:  Thought content normal          Judgment: Judgment normal

## 2021-07-24 ENCOUNTER — APPOINTMENT (OUTPATIENT)
Dept: LAB | Facility: HOSPITAL | Age: 41
End: 2021-07-24
Payer: COMMERCIAL

## 2021-07-24 ENCOUNTER — HOSPITAL ENCOUNTER (OUTPATIENT)
Dept: RADIOLOGY | Facility: HOSPITAL | Age: 41
Discharge: HOME | End: 2021-07-24
Payer: COMMERCIAL

## 2021-07-24 DIAGNOSIS — Z11.4 SCREENING FOR HIV (HUMAN IMMUNODEFICIENCY VIRUS): ICD-10-CM

## 2021-07-24 DIAGNOSIS — E66.01 MORBID OBESITY WITH BMI OF 50.0-59.9, ADULT (HCC): ICD-10-CM

## 2021-07-24 DIAGNOSIS — L65.9 HAIR LOSS: ICD-10-CM

## 2021-07-24 DIAGNOSIS — Z00.00 ANNUAL PHYSICAL EXAM: ICD-10-CM

## 2021-07-24 DIAGNOSIS — R06.9 BREATHING PROBLEM: ICD-10-CM

## 2021-07-24 DIAGNOSIS — Z98.84 S/P GASTRIC BYPASS: ICD-10-CM

## 2021-07-24 LAB
25(OH)D3 SERPL-MCNC: 22.2 NG/ML (ref 30–100)
ALBUMIN SERPL BCP-MCNC: 3.7 G/DL (ref 3–5.2)
ALP SERPL-CCNC: 57 U/L (ref 43–122)
ALT SERPL W P-5'-P-CCNC: 13 U/L
ANION GAP SERPL CALCULATED.3IONS-SCNC: 7 MMOL/L (ref 5–14)
AST SERPL W P-5'-P-CCNC: 22 U/L (ref 14–36)
BASOPHILS # BLD AUTO: 0 THOUSANDS/ΜL (ref 0–0.1)
BASOPHILS NFR BLD AUTO: 1 % (ref 0–1)
BILIRUB SERPL-MCNC: 0.53 MG/DL
BUN SERPL-MCNC: 13 MG/DL (ref 5–25)
CALCIUM SERPL-MCNC: 9 MG/DL (ref 8.4–10.2)
CHLORIDE SERPL-SCNC: 109 MMOL/L (ref 97–108)
CHOLEST SERPL-MCNC: 169 MG/DL
CO2 SERPL-SCNC: 25 MMOL/L (ref 22–30)
CREAT SERPL-MCNC: 0.63 MG/DL (ref 0.6–1.2)
EOSINOPHIL # BLD AUTO: 0.1 THOUSAND/ΜL (ref 0–0.4)
EOSINOPHIL NFR BLD AUTO: 2 % (ref 0–6)
ERYTHROCYTE [DISTWIDTH] IN BLOOD BY AUTOMATED COUNT: 13.7 %
EST. AVERAGE GLUCOSE BLD GHB EST-MCNC: 97 MG/DL
FERRITIN SERPL-MCNC: 13 NG/ML (ref 8–388)
FOLATE SERPL-MCNC: >20 NG/ML (ref 3.1–17.5)
GFR SERPL CREATININE-BSD FRML MDRD: 111 ML/MIN/1.73SQ M
GLUCOSE P FAST SERPL-MCNC: 83 MG/DL (ref 70–99)
HBA1C MFR BLD: 5 %
HCT VFR BLD AUTO: 35.7 % (ref 36–46)
HDLC SERPL-MCNC: 51 MG/DL
HGB BLD-MCNC: 12 G/DL (ref 12–16)
IRON SERPL-MCNC: 57 UG/DL (ref 50–170)
LDLC SERPL CALC-MCNC: 100 MG/DL
LYMPHOCYTES # BLD AUTO: 1.4 THOUSANDS/ΜL (ref 0.5–4)
LYMPHOCYTES NFR BLD AUTO: 25 % (ref 25–45)
MAGNESIUM SERPL-MCNC: 2 MG/DL (ref 1.6–2.3)
MCH RBC QN AUTO: 28.9 PG (ref 26–34)
MCHC RBC AUTO-ENTMCNC: 33.5 G/DL (ref 31–36)
MCV RBC AUTO: 86 FL (ref 80–100)
MONOCYTES # BLD AUTO: 0.4 THOUSAND/ΜL (ref 0.2–0.9)
MONOCYTES NFR BLD AUTO: 7 % (ref 1–10)
NEUTROPHILS # BLD AUTO: 3.5 THOUSANDS/ΜL (ref 1.8–7.8)
NEUTS SEG NFR BLD AUTO: 65 % (ref 45–65)
NONHDLC SERPL-MCNC: 118 MG/DL
PLATELET # BLD AUTO: 282 THOUSANDS/UL (ref 150–450)
PMV BLD AUTO: 11.3 FL (ref 8.9–12.7)
POTASSIUM SERPL-SCNC: 4.3 MMOL/L (ref 3.6–5)
PROT SERPL-MCNC: 6.9 G/DL (ref 5.9–8.4)
RBC # BLD AUTO: 4.14 MILLION/UL (ref 4–5.2)
SODIUM SERPL-SCNC: 141 MMOL/L (ref 137–147)
TRIGL SERPL-MCNC: 92 MG/DL
TSH SERPL DL<=0.05 MIU/L-ACNC: 1.65 UIU/ML (ref 0.47–4.68)
VIT B12 SERPL-MCNC: 326 PG/ML (ref 100–900)
WBC # BLD AUTO: 5.5 THOUSAND/UL (ref 4.5–11)

## 2021-07-24 PROCEDURE — 83036 HEMOGLOBIN GLYCOSYLATED A1C: CPT

## 2021-07-24 PROCEDURE — 84443 ASSAY THYROID STIM HORMONE: CPT

## 2021-07-24 PROCEDURE — 85025 COMPLETE CBC W/AUTO DIFF WBC: CPT

## 2021-07-24 PROCEDURE — 83540 ASSAY OF IRON: CPT

## 2021-07-24 PROCEDURE — 36415 COLL VENOUS BLD VENIPUNCTURE: CPT

## 2021-07-24 PROCEDURE — 82728 ASSAY OF FERRITIN: CPT

## 2021-07-24 PROCEDURE — 82607 VITAMIN B-12: CPT

## 2021-07-24 PROCEDURE — 71046 X-RAY EXAM CHEST 2 VIEWS: CPT

## 2021-07-24 PROCEDURE — 83735 ASSAY OF MAGNESIUM: CPT

## 2021-07-24 PROCEDURE — 80053 COMPREHEN METABOLIC PANEL: CPT

## 2021-07-24 PROCEDURE — 82306 VITAMIN D 25 HYDROXY: CPT

## 2021-07-24 PROCEDURE — 84425 ASSAY OF VITAMIN B-1: CPT

## 2021-07-24 PROCEDURE — 82746 ASSAY OF FOLIC ACID SERUM: CPT

## 2021-07-24 PROCEDURE — 87389 HIV-1 AG W/HIV-1&-2 AB AG IA: CPT

## 2021-07-24 PROCEDURE — 80061 LIPID PANEL: CPT

## 2021-07-26 LAB — HIV 1+2 AB+HIV1 P24 AG SERPL QL IA: NORMAL

## 2021-07-28 ENCOUNTER — TELEPHONE (OUTPATIENT)
Dept: FAMILY MEDICINE CLINIC | Facility: CLINIC | Age: 41
End: 2021-07-28

## 2021-07-29 ENCOUNTER — TELEPHONE (OUTPATIENT)
Dept: FAMILY MEDICINE CLINIC | Facility: CLINIC | Age: 41
End: 2021-07-29

## 2021-07-29 NOTE — TELEPHONE ENCOUNTER
Patient is already taking Vitamin D 5000 units daily, also taking Folate 400 mcg   Please advise if dosage needs to be adjusted

## 2021-07-29 NOTE — TELEPHONE ENCOUNTER
----- Message from Jose Luis Barriga, 10 Leigha Ott sent at 7/27/2021  6:52 AM EDT -----  Folate is up high   iIf taking folate please decrease amount taking   vitamin d is low   Start on 5000 daily

## 2021-07-30 LAB — VIT B1 BLD-SCNC: 171.1 NMOL/L (ref 66.5–200)

## 2021-08-25 ENCOUNTER — OFFICE VISIT (OUTPATIENT)
Dept: FAMILY MEDICINE CLINIC | Facility: CLINIC | Age: 41
End: 2021-08-25
Payer: COMMERCIAL

## 2021-08-25 VITALS
DIASTOLIC BLOOD PRESSURE: 82 MMHG | TEMPERATURE: 97.4 F | HEART RATE: 71 BPM | BODY MASS INDEX: 46.92 KG/M2 | SYSTOLIC BLOOD PRESSURE: 124 MMHG | HEIGHT: 65 IN | RESPIRATION RATE: 18 BRPM | OXYGEN SATURATION: 99 % | WEIGHT: 281.6 LBS

## 2021-08-25 DIAGNOSIS — E66.01 MORBID OBESITY WITH BMI OF 45.0-49.9, ADULT (HCC): ICD-10-CM

## 2021-08-25 DIAGNOSIS — Z11.59 SCREENING FOR VIRAL DISEASE: ICD-10-CM

## 2021-08-25 DIAGNOSIS — K29.70 GASTRITIS WITHOUT BLEEDING, UNSPECIFIED CHRONICITY, UNSPECIFIED GASTRITIS TYPE: ICD-10-CM

## 2021-08-25 DIAGNOSIS — M25.561 RIGHT KNEE PAIN, UNSPECIFIED CHRONICITY: ICD-10-CM

## 2021-08-25 DIAGNOSIS — L65.9 HAIR LOSS: Primary | ICD-10-CM

## 2021-08-25 PROBLEM — N17.9 AKI (ACUTE KIDNEY INJURY) (HCC): Status: ACTIVE | Noted: 2021-04-19

## 2021-08-25 PROBLEM — K56.609 SMALL BOWEL OBSTRUCTION (HCC): Status: ACTIVE | Noted: 2021-04-16

## 2021-08-25 PROBLEM — T81.43XA ABSCESS, INTRA-ABDOMINAL, POSTOPERATIVE: Status: ACTIVE | Noted: 2021-05-01

## 2021-08-25 PROBLEM — N93.9 ABNORMAL UTERINE BLEEDING (AUB): Status: ACTIVE | Noted: 2021-04-21

## 2021-08-25 PROBLEM — Z98.84 H/O GASTRIC BYPASS: Status: ACTIVE | Noted: 2021-04-19

## 2021-08-25 PROBLEM — K65.1 ABSCESS, INTRA-ABDOMINAL, POSTOPERATIVE (HCC): Status: ACTIVE | Noted: 2021-05-01

## 2021-08-25 PROBLEM — E87.6 HYPOKALEMIA: Status: ACTIVE | Noted: 2021-05-01

## 2021-08-25 LAB — HCV AB SER QL: NORMAL

## 2021-08-25 PROCEDURE — 1036F TOBACCO NON-USER: CPT | Performed by: NURSE PRACTITIONER

## 2021-08-25 PROCEDURE — 86592 SYPHILIS TEST NON-TREP QUAL: CPT | Performed by: NURSE PRACTITIONER

## 2021-08-25 PROCEDURE — 86803 HEPATITIS C AB TEST: CPT | Performed by: NURSE PRACTITIONER

## 2021-08-25 PROCEDURE — 3008F BODY MASS INDEX DOCD: CPT | Performed by: NURSE PRACTITIONER

## 2021-08-25 PROCEDURE — 36415 COLL VENOUS BLD VENIPUNCTURE: CPT | Performed by: NURSE PRACTITIONER

## 2021-08-25 PROCEDURE — 99214 OFFICE O/P EST MOD 30 MIN: CPT | Performed by: NURSE PRACTITIONER

## 2021-08-26 LAB — RPR SER QL: NORMAL

## 2021-08-28 ENCOUNTER — HOSPITAL ENCOUNTER (OUTPATIENT)
Dept: ULTRASOUND IMAGING | Facility: HOSPITAL | Age: 41
Discharge: HOME | End: 2021-08-28
Payer: COMMERCIAL

## 2021-08-28 DIAGNOSIS — M71.21 BAKER'S CYST OF KNEE, RIGHT: ICD-10-CM

## 2021-08-28 PROCEDURE — 76882 US LMTD JT/FCL EVL NVASC XTR: CPT

## 2021-09-22 ENCOUNTER — APPOINTMENT (EMERGENCY)
Dept: NON INVASIVE DIAGNOSTICS | Facility: HOSPITAL | Age: 41
End: 2021-09-22
Payer: COMMERCIAL

## 2021-09-22 ENCOUNTER — HOSPITAL ENCOUNTER (EMERGENCY)
Facility: HOSPITAL | Age: 41
Discharge: HOME | End: 2021-09-22
Attending: EMERGENCY MEDICINE | Admitting: EMERGENCY MEDICINE
Payer: COMMERCIAL

## 2021-09-22 ENCOUNTER — APPOINTMENT (EMERGENCY)
Dept: RADIOLOGY | Facility: HOSPITAL | Age: 41
End: 2021-09-22
Payer: COMMERCIAL

## 2021-09-22 VITALS
HEIGHT: 65 IN | SYSTOLIC BLOOD PRESSURE: 157 MMHG | DIASTOLIC BLOOD PRESSURE: 94 MMHG | RESPIRATION RATE: 16 BRPM | BODY MASS INDEX: 47.15 KG/M2 | HEART RATE: 87 BPM | OXYGEN SATURATION: 97 % | TEMPERATURE: 98.3 F | WEIGHT: 283 LBS

## 2021-09-22 DIAGNOSIS — M25.561 RIGHT KNEE PAIN: Primary | ICD-10-CM

## 2021-09-22 PROCEDURE — 99284 EMERGENCY DEPT VISIT MOD MDM: CPT | Performed by: PHYSICIAN ASSISTANT

## 2021-09-22 PROCEDURE — 73564 X-RAY EXAM KNEE 4 OR MORE: CPT

## 2021-09-22 PROCEDURE — 93971 EXTREMITY STUDY: CPT

## 2021-09-22 PROCEDURE — 99284 EMERGENCY DEPT VISIT MOD MDM: CPT

## 2021-09-22 PROCEDURE — 93971 EXTREMITY STUDY: CPT | Performed by: SURGERY

## 2021-09-22 NOTE — Clinical Note
Gustavo Jason was seen and treated in our emergency department on 9/22/2021  Diagnosis:     Wally Mayes  may return to work on return date  She may return on this date: 09/24/2021         If you have any questions or concerns, please don't hesitate to call        Carmina Ball PA-C    ______________________________           _______________          _______________  Hospital Representative                              Date                                Time

## 2021-09-22 NOTE — ED PROVIDER NOTES
History  Chief Complaint   Patient presents with    Leg Pain     pt reports progressively worsening leg pain since the end of august       Patient is a 40 y/o female presenting to the ED for evaluation of right lower extremity pain x2 months  Patient states the pain started around the middle of July and has progressively worsened since that time  She denies falls, trauma or injury leading up to the pain  She states the pain is primarily on the medial and posterior aspect of the right knee but radiates up the anterior thigh and down the right lower leg  She denies numbness or weakness of the extremity  She denies knee instability  Patient was seen by her PCP on  and they ordered a soft tissue ultrasound to rule out Baker's cyst which was normal  She reports continued pain and mild swelling which prompted her to come to the ED for further evaluation  Of note, patient had surgery in 2021 for an SBO and says she was relatively immobile for about 3 months after this  She denies history of DVT/PE but is concerned she could have a blood clot  She denies fevers, chills or erythema/warmth of the joint  Prior to Admission Medications   Prescriptions Last Dose Informant Patient Reported? Taking?    Diclofenac Sodium (VOLTAREN) 1 %   No No   Sig: Apply 2 g topically 4 (four) times a day   Multiple Vitamins-Minerals (MULTIVITAMIN ADULT) TABS  Self Yes No   Sig: Take 1 tablet by mouth daily   albuterol (ProAir HFA) 90 mcg/act inhaler   No No   Sig: Inhale 2 puffs every 6 (six) hours as needed for wheezing   cyclobenzaprine (FLEXERIL) 5 mg tablet   No No   Sig: Take 1 tablet (5 mg total) by mouth daily at bedtime as needed for muscle spasms   esomeprazole (NexIUM) 20 mg capsule   No No   Sig: Take 1 capsule (20 mg total) by mouth daily before breakfast   fluticasone (FLONASE ALLERGY RELIEF) 50 mcg/act nasal spray  Self Yes No   Si sprays into each nostril as needed    loratadine (CLARITIN) 10 mg tablet Self Yes No   Sig: Take by mouth      Facility-Administered Medications: None       Past Medical History:   Diagnosis Date    Abdominal pain 12/26/2011    ALSO 1/25/2013    Back pain 08/2007    Chest pain 12/2011 12/2011 ER visit  12/27/01 Hospitalization    Foot pain 11/2007    PLANTAR FASCITIS    Gastro-esophageal reflux 2012    Heel pain 04/23/2008    LT    Obesity     MORBID WITH BMI OF 62    Rectal bleeding     Diarrhea       Past Surgical History:   Procedure Laterality Date    COLONOSCOPY  02/2012    ESOPHAGOGASTRODUODENOSCOPY  02/2012    GASTRIC BYPASS  05/23/2012    Morbid Obesity    HERNIA REPAIR      OTHER SURGICAL HISTORY  11/2007    PLANTAR FASCITIS       Family History   Problem Relation Age of Onset    Hypertension Mother     Diabetes Father         Mellitus    Hypertension Father     Coronary artery disease Father 36        Premature    Colon cancer Maternal Aunt 58    Breast cancer Maternal Aunt 58    Breast cancer Paternal Aunt 58    Diabetes Family         Melitus    Diabetes Family         Mellitus    No Known Problems Sister     No Known Problems Maternal Grandmother     No Known Problems Maternal Grandfather     No Known Problems Paternal Grandmother     No Known Problems Paternal Grandfather     No Known Problems Maternal Aunt     No Known Problems Maternal Aunt     No Known Problems Maternal Aunt     No Known Problems Maternal Aunt     No Known Problems Maternal Aunt     No Known Problems Maternal Aunt     No Known Problems Maternal Aunt     No Known Problems Paternal Aunt     No Known Problems Paternal Aunt     Breast cancer Cousin      I have reviewed and agree with the history as documented      E-Cigarette/Vaping    E-Cigarette Use Never User      E-Cigarette/Vaping Substances    Nicotine No     THC No     CBD No     Flavoring No     Other No     Unknown No      Social History     Tobacco Use    Smoking status: Former Smoker     Quit date: 2004     Years since quittin 2    Smokeless tobacco: Never Used    Tobacco comment: USED CHANTIX TO QUIT SMOKING   Per [employer] 10/4/16 Never a smoker   Vaping Use    Vaping Use: Never used   Substance Use Topics    Alcohol use: Yes     Comment: social    Drug use: No       Review of Systems   Constitutional: Negative for appetite change, chills, fatigue and fever  HENT: Negative for congestion, rhinorrhea, sinus pressure, sinus pain and sore throat  Eyes: Negative for photophobia and visual disturbance  Respiratory: Negative for cough, shortness of breath and wheezing  Cardiovascular: Negative for chest pain, palpitations and leg swelling  Gastrointestinal: Negative for abdominal pain, blood in stool, constipation, diarrhea, nausea and vomiting  Genitourinary: Negative for difficulty urinating, dysuria, flank pain, frequency, hematuria and urgency  Musculoskeletal: Positive for myalgias (right lower extremity pain)  Negative for arthralgias, back pain, joint swelling and neck pain  Neurological: Negative for dizziness, syncope, weakness, light-headedness and headaches  All other systems reviewed and are negative  Physical Exam  Physical Exam  Vitals and nursing note reviewed  Constitutional:       General: She is awake  Appearance: Normal appearance  She is well-developed  She is not toxic-appearing or diaphoretic  HENT:      Head: Normocephalic and atraumatic  Right Ear: External ear normal       Left Ear: External ear normal       Nose: Nose normal       Mouth/Throat:      Lips: Pink  Mouth: Mucous membranes are moist    Eyes:      General: Lids are normal  No scleral icterus  Conjunctiva/sclera: Conjunctivae normal       Pupils: Pupils are equal, round, and reactive to light  Cardiovascular:      Rate and Rhythm: Normal rate and regular rhythm  Pulses: Normal pulses  Radial pulses are 2+ on the right side and 2+ on the left side  Heart sounds: Normal heart sounds, S1 normal and S2 normal    Pulmonary:      Effort: Pulmonary effort is normal  No accessory muscle usage  Breath sounds: Normal breath sounds  No stridor  No decreased breath sounds, wheezing, rhonchi or rales  Abdominal:      General: Abdomen is flat  Bowel sounds are normal  There is no distension  Palpations: Abdomen is soft  Tenderness: There is no abdominal tenderness  There is no right CVA tenderness, left CVA tenderness, guarding or rebound  Musculoskeletal:      Cervical back: Full passive range of motion without pain and neck supple  No signs of trauma  No pain with movement  Right knee: Swelling present  No deformity, effusion, erythema or crepitus  Decreased range of motion (increased pain with full extension of knee)  Tenderness present over the medial joint line and MCL  Normal pulse  Right lower leg: No edema  Left lower leg: No edema  Legs:    Lymphadenopathy:      Cervical: No cervical adenopathy  Skin:     General: Skin is warm and dry  Capillary Refill: Capillary refill takes less than 2 seconds  Coloration: Skin is not cyanotic, jaundiced or pale  Neurological:      Mental Status: She is alert and oriented to person, place, and time  GCS: GCS eye subscore is 4  GCS verbal subscore is 5  GCS motor subscore is 6  Gait: Gait normal    Psychiatric:         Mood and Affect: Mood normal          Speech: Speech normal          Behavior: Behavior is cooperative           Vital Signs  ED Triage Vitals [09/22/21 1133]   Temperature Pulse Respirations Blood Pressure SpO2   98 3 °F (36 8 °C) 87 16 157/94 97 %      Temp Source Heart Rate Source Patient Position - Orthostatic VS BP Location FiO2 (%)   Oral Monitor -- -- --      Pain Score       9           Vitals:    09/22/21 1133   BP: 157/94   Pulse: 87         Visual Acuity      ED Medications  Medications - No data to display    Diagnostic Studies  Results Reviewed     None                 XR knee 4+ views Right injury   Final Result by Jose Martinez MD (09/22 1319)      No acute osseous abnormality  Degenerative changes as described  Workstation performed: [id number]         VAS lower limb venous duplex study, unilateral/limited    (Results Pending)              Procedures  Procedures         ED Course  ED Course as of Sep 22 1322   Wed Sep 22, 2021   1233 Venous duplex negative for DVT  SBIRT 20yo+      Most Recent Value   SBIRT (22 yo +)   In order to provide better care to our patients, we are screening all of our patients for alcohol and drug use  Would it be okay to ask you these screening questions? No Filed at: 09/22/2021 1138                    MDM  Number of Diagnoses or Management Options  Right knee pain  Diagnosis management comments: Patient is a 42 y/o female presenting to the ED for evaluation of right lower extremity pain x2 months  Venous duplex negative for DVT  No acute fracture on x-ray but did show evidence of mild to moderate osteoarthritis  Orthopedic referral provided  Patient unable to take PO NSAIDs due to hx of gastric bypass; rx for voltaren gel sent to patient's pharmacy  Advised rest, ice, elevation  The management plan was discussed in detail with the patient at bedside and all questions were answered  Prior to discharge, verbal and written instructions provided  ED return precautions discussed in detail  The patient verbalized understanding of our discussion and plan of care, and agrees to return to the Emergency Department for concerns and progression of illness         Amount and/or Complexity of Data Reviewed  Tests in the radiology section of CPT®: ordered and reviewed    Patient Progress  Patient progress: stable      Disposition  Final diagnoses:   Right knee pain     Time reflects when diagnosis was documented in both MDM as applicable and the Disposition within this note     Time User Action Codes Description Comment    9/22/2021  1:08 PM Perrylexie Stock Add [Q03 [id number]] Right knee pain       ED Disposition     ED Disposition Condition Date/Time Comment    Discharge Stable Wed Sep 22, 2021  1:07 PM Gara Crigler discharge to home/self care  Follow-up Information     Follow up With Specialties Details Why Contact Info Additional [address] [facility] Orthopedic Surgery Schedule an appointment as soon as possible for a visit   Kelsey [address] [facility] [facility], [address], [place], [place], [address] [facility]    MELVIN Dash Nurse Practitioner  As needed [address] [phone]       [address] [place] [address] 34 [facility] Emergency Medicine  If symptoms worsen [address]  [address] [facility] [address] [facility] Emergency Department, [address], [place], [place], [facility]          Discharge Medication List as of 9/22/2021  1:09 PM      START taking these medications    Details   !! Diclofenac Sodium (VOLTAREN) 1 % Apply 2 g topically 4 (four) times a day, Starting Wed 9/22/2021, Normal       !! - Potential duplicate medications found  Please discuss with provider  CONTINUE these medications which have NOT CHANGED    Details   albuterol (ProAir HFA) 90 mcg/act inhaler Inhale 2 puffs every 6 (six) hours as needed for wheezing, Starting Wed 7/21/2021, Normal      cyclobenzaprine (FLEXERIL) 5 mg tablet Take 1 tablet (5 mg total) by mouth daily at bedtime as needed for muscle spasms, Starting Wed 1/27/2021, Normal      !!  Diclofenac Sodium (VOLTAREN) 1 % Apply 2 g topically 4 (four) times a day, Starting Wed 1/27/2021, Normal      esomeprazole (NexIUM) 20 mg capsule Take 1 capsule (20 mg total) by mouth daily before breakfast, Starting Wed 8/25/2021, Normal      fluticasone (FLONASE ALLERGY RELIEF) 50 mcg/act nasal spray 2 sprays into each nostril as needed , Historical Med      loratadine (CLARITIN) 10 mg tablet Take by mouth, Historical Med      Multiple Vitamins-Minerals (MULTIVITAMIN ADULT) TABS Take 1 tablet by mouth daily, Historical Med       !! - Potential duplicate medications found  Please discuss with provider              PDMP Review     None          ED Provider  Electronically Signed by           Carmina Ball PA-C  09/22/21 7933

## 2021-09-23 NOTE — PROGRESS NOTES
BMI Counseling: Body mass index is 46 86 kg/m²  The BMI is above normal  Nutrition recommendations include reducing portion sizes, decreasing overall calorie intake, 3-5 servings of fruits/vegetables daily, reducing fast food intake, consuming healthier snacks, decreasing soda and/or juice intake, moderation in carbohydrate intake, increasing intake of lean protein, reducing intake of saturated fat and trans fat and reducing intake of cholesterol  Exercise recommendations include moderate aerobic physical activity for 150 minutes/week, exercising 3-5 times per week, joining a gym and strength training exercises  Assessment/Plan:         Diagnoses and all orders for this visit:    Hair loss  -     Ambulatory referral to Dermatology; Future  -     RPR    Morbid obesity with BMI of 45 0-49 9, adult ([facility])    Screening for viral disease  -     Hepatitis C antibody    Gastritis without bleeding, unspecified chronicity, unspecified gastritis type  -     esomeprazole (NexIUM) 20 mg capsule; Take 1 capsule (20 mg total) by mouth daily before breakfast    Right knee pain, unspecified chronicity  -      MSK limited; Future          Subjective:      Patient ID: Lilian Waller is a 41 y o  female      HPI  Hair loss    Getting worse    Labs ok    Appeared after the major surgery in April        Nexium 40 mg stopped pain in gastritis but caused bloating   Will go back to 20 mg    Right knee pain    Hurts in back of the knee and tightness and pain on the inner aspect of knee         The following portions of the patient's history were reviewed and updated as appropriate: allergies, current medications, past family history, past medical history, past social history, past surgical history and problem list     Review of Systems      Objective:  Vitals:    08/25/21 0848   BP: 124/82   BP Location: Left arm   Patient Position: Sitting   Cuff Size: Large   Pulse: 71   Resp: 18   Temp: (!) 97 4 °F (36 3 °C)   TempSrc: Tympanic   SpO2: 99%   Weight: 128 kg (281 lb 9 6 oz)   Height: 5' 5" (1 651 m)      Physical Exam Xeljanz Counseling: I discussed with the patient the risks of Xeljanz therapy including increased risk of infection, liver issues, headache, diarrhea, or cold symptoms. Live vaccines should be avoided. They were instructed to call if they have any problems.

## 2021-10-05 ENCOUNTER — OFFICE VISIT (OUTPATIENT)
Dept: OBGYN CLINIC | Facility: MEDICAL CENTER | Age: 41
End: 2021-10-05
Payer: COMMERCIAL

## 2021-10-05 VITALS
WEIGHT: 280 LBS | HEART RATE: 77 BPM | DIASTOLIC BLOOD PRESSURE: 97 MMHG | BODY MASS INDEX: 46.65 KG/M2 | HEIGHT: 65 IN | SYSTOLIC BLOOD PRESSURE: 151 MMHG

## 2021-10-05 DIAGNOSIS — M17.11 PRIMARY OSTEOARTHRITIS OF RIGHT KNEE: Primary | ICD-10-CM

## 2021-10-05 DIAGNOSIS — G89.29 CHRONIC PAIN OF RIGHT KNEE: ICD-10-CM

## 2021-10-05 DIAGNOSIS — M25.561 CHRONIC PAIN OF RIGHT KNEE: ICD-10-CM

## 2021-10-05 DIAGNOSIS — M70.50 PES ANSERINE BURSITIS: ICD-10-CM

## 2021-10-05 PROCEDURE — 1036F TOBACCO NON-USER: CPT | Performed by: STUDENT IN AN ORGANIZED HEALTH CARE EDUCATION/TRAINING PROGRAM

## 2021-10-05 PROCEDURE — 3008F BODY MASS INDEX DOCD: CPT | Performed by: STUDENT IN AN ORGANIZED HEALTH CARE EDUCATION/TRAINING PROGRAM

## 2021-10-05 PROCEDURE — 20610 DRAIN/INJ JOINT/BURSA W/O US: CPT | Performed by: STUDENT IN AN ORGANIZED HEALTH CARE EDUCATION/TRAINING PROGRAM

## 2021-10-05 PROCEDURE — 99204 OFFICE O/P NEW MOD 45 MIN: CPT | Performed by: STUDENT IN AN ORGANIZED HEALTH CARE EDUCATION/TRAINING PROGRAM

## 2021-10-05 RX ORDER — LIDOCAINE HYDROCHLORIDE 10 MG/ML
4 INJECTION, SOLUTION INFILTRATION; PERINEURAL
Status: COMPLETED | OUTPATIENT
Start: 2021-10-05 | End: 2021-10-05

## 2021-10-05 RX ORDER — TRIAMCINOLONE ACETONIDE 40 MG/ML
20 INJECTION, SUSPENSION INTRA-ARTICULAR; INTRAMUSCULAR
Status: COMPLETED | OUTPATIENT
Start: 2021-10-05 | End: 2021-10-05

## 2021-10-05 RX ORDER — TRIAMCINOLONE ACETONIDE 40 MG/ML
40 INJECTION, SUSPENSION INTRA-ARTICULAR; INTRAMUSCULAR
Status: COMPLETED | OUTPATIENT
Start: 2021-10-05 | End: 2021-10-05

## 2021-10-05 RX ORDER — LIDOCAINE HYDROCHLORIDE 10 MG/ML
0.5 INJECTION, SOLUTION INFILTRATION; PERINEURAL
Status: COMPLETED | OUTPATIENT
Start: 2021-10-05 | End: 2021-10-05

## 2021-10-05 RX ADMIN — TRIAMCINOLONE ACETONIDE 20 MG: 40 INJECTION, SUSPENSION INTRA-ARTICULAR; INTRAMUSCULAR at 08:41

## 2021-10-05 RX ADMIN — LIDOCAINE HYDROCHLORIDE 0.5 ML: 10 INJECTION, SOLUTION INFILTRATION; PERINEURAL at 08:41

## 2021-10-05 RX ADMIN — TRIAMCINOLONE ACETONIDE 40 MG: 40 INJECTION, SUSPENSION INTRA-ARTICULAR; INTRAMUSCULAR at 08:41

## 2021-10-05 RX ADMIN — LIDOCAINE HYDROCHLORIDE 4 ML: 10 INJECTION, SOLUTION INFILTRATION; PERINEURAL at 08:41

## 2021-11-16 ENCOUNTER — OFFICE VISIT (OUTPATIENT)
Dept: OBGYN CLINIC | Facility: MEDICAL CENTER | Age: 41
End: 2021-11-16
Payer: COMMERCIAL

## 2021-11-16 VITALS
DIASTOLIC BLOOD PRESSURE: 87 MMHG | SYSTOLIC BLOOD PRESSURE: 134 MMHG | HEIGHT: 65 IN | BODY MASS INDEX: 46.65 KG/M2 | WEIGHT: 280 LBS | HEART RATE: 62 BPM

## 2021-11-16 DIAGNOSIS — M25.561 CHRONIC PAIN OF RIGHT KNEE: Primary | ICD-10-CM

## 2021-11-16 DIAGNOSIS — M70.50 PES ANSERINE BURSITIS: ICD-10-CM

## 2021-11-16 DIAGNOSIS — M17.11 PRIMARY OSTEOARTHRITIS OF RIGHT KNEE: ICD-10-CM

## 2021-11-16 DIAGNOSIS — G89.29 CHRONIC PAIN OF RIGHT KNEE: Primary | ICD-10-CM

## 2021-11-16 PROCEDURE — 99213 OFFICE O/P EST LOW 20 MIN: CPT | Performed by: STUDENT IN AN ORGANIZED HEALTH CARE EDUCATION/TRAINING PROGRAM

## 2021-11-16 PROCEDURE — 1036F TOBACCO NON-USER: CPT | Performed by: STUDENT IN AN ORGANIZED HEALTH CARE EDUCATION/TRAINING PROGRAM

## 2021-11-16 PROCEDURE — 3008F BODY MASS INDEX DOCD: CPT | Performed by: STUDENT IN AN ORGANIZED HEALTH CARE EDUCATION/TRAINING PROGRAM

## 2022-06-07 ENCOUNTER — OFFICE VISIT (OUTPATIENT)
Dept: FAMILY MEDICINE CLINIC | Facility: CLINIC | Age: 42
End: 2022-06-07
Payer: COMMERCIAL

## 2022-06-07 VITALS
BODY MASS INDEX: 47.55 KG/M2 | DIASTOLIC BLOOD PRESSURE: 78 MMHG | RESPIRATION RATE: 18 BRPM | WEIGHT: 285.4 LBS | HEART RATE: 75 BPM | TEMPERATURE: 97.4 F | OXYGEN SATURATION: 100 % | HEIGHT: 65 IN | SYSTOLIC BLOOD PRESSURE: 118 MMHG

## 2022-06-07 DIAGNOSIS — Z00.00 ANNUAL PHYSICAL EXAM: Primary | ICD-10-CM

## 2022-06-07 DIAGNOSIS — E66.01 MORBID OBESITY WITH BMI OF 45.0-49.9, ADULT (HCC): ICD-10-CM

## 2022-06-07 DIAGNOSIS — Z12.31 ENCOUNTER FOR SCREENING MAMMOGRAM FOR MALIGNANT NEOPLASM OF BREAST: ICD-10-CM

## 2022-06-07 PROCEDURE — 3725F SCREEN DEPRESSION PERFORMED: CPT | Performed by: NURSE PRACTITIONER

## 2022-06-07 PROCEDURE — 3008F BODY MASS INDEX DOCD: CPT | Performed by: NURSE PRACTITIONER

## 2022-06-07 PROCEDURE — 99396 PREV VISIT EST AGE 40-64: CPT | Performed by: NURSE PRACTITIONER

## 2022-06-07 PROCEDURE — 1036F TOBACCO NON-USER: CPT | Performed by: NURSE PRACTITIONER

## 2022-06-07 NOTE — PATIENT INSTRUCTIONS

## 2022-06-07 NOTE — PROGRESS NOTES
BMI Counseling: Body mass index is 47 49 kg/m²  The BMI is above normal  Nutrition recommendations include reducing portion sizes, decreasing overall calorie intake, 3-5 servings of fruits/vegetables daily, reducing fast food intake, consuming healthier snacks, decreasing soda and/or juice intake and moderation in carbohydrate intake  Exercise recommendations include exercising 3-5 times per week  [address]    NAME: Rosita Vazquez  AGE: 35 y o  SEX: female  : 1980     DATE: 2022     Assessment and Plan:     Problem List Items Addressed This Visit    None     Visit Diagnoses     Annual physical exam    -  Primary    Relevant Orders    CBC and differential    Comprehensive metabolic panel    TSH, 3rd generation with Free T4 reflex    Lipid Panel with Direct LDL reflex    Encounter for screening mammogram for malignant neoplasm of breast        Relevant Orders    Mammo screening bilateral w 3d & cad    Morbid obesity with BMI of 45 0-49 9, adult ([facility] [address] )        Relevant Orders    CBC and differential    Comprehensive metabolic panel    TSH, 3rd generation with Free T4 reflex    Lipid Panel with Direct LDL reflex          Immunizations and preventive care screenings were discussed with patient today  Appropriate education was printed on patient's after visit summary  Counseling:  Alcohol/drug use: discussed moderation in alcohol intake, the recommendations for healthy alcohol use, and avoidance of illicit drug use  Dental Health: discussed importance of regular tooth brushing, flossing, and dental visits  Injury prevention: discussed safety/seat belts, safety helmets, smoke detectors, carbon dioxide detectors, and smoking near bedding or upholstery    Sexual health: discussed sexually transmitted diseases, partner selection, use of condoms, avoidance of unintended pregnancy, and contraceptive alternatives  · Exercise: the importance of regular exercise/physical activity was discussed  Recommend exercise 3-5 times per week for at least 30 minutes  Return in about 1 year (around 6/7/2023) for Annual physical      Chief Complaint:     Chief Complaint   Patient presents with    Annual Exam    Knee Pain      History of Present Illness:     Adult Annual Physical   Patient here for a comprehensive physical exam  The patient reports problems - 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    Hx of knee issues and is stretching  Weakness after sit for a time and everything tightens up   Once moving ok     Diet and Physical Activity  · Diet/Nutrition: well balanced diet and consuming 3-5 servings of fruits/vegetables daily  · Exercise: no formal exercise  Depression Screening  PHQ-2/9 Depression Screening    Little interest or pleasure in doing things: 0 - not at all  Feeling down, depressed, or hopeless: 0 - not at all  PHQ-2 Score: 0  PHQ-2 Interpretation: Negative depression screen       General Health  · Sleep: sleeps well  · Hearing: normal - bilateral   · Vision: wears glasses  · Dental: no dental visits for >1 year  /GYN Health  · Patient is: perimenopausal  · Last menstrual period: regular  · Contraceptive method: none  Review of Systems:     Review of Systems   Constitutional: Negative for activity change, appetite change, chills and fever  HENT: Negative for congestion, ear pain, postnasal drip and sore throat  Eyes: Negative for pain and visual disturbance  Respiratory: Negative for cough and shortness of breath  Cardiovascular: Negative for chest pain and palpitations  Gastrointestinal: Negative for abdominal pain and vomiting  Genitourinary: Negative for dysuria, hematuria and urgency  Musculoskeletal: Positive for arthralgias  Negative for back pain  Skin: Negative for color change and rash     Neurological: Negative for seizures, syncope and headaches  Psychiatric/Behavioral: The patient is not nervous/anxious  All other systems reviewed and are negative  Past Medical History:     Past Medical History:   Diagnosis Date    Abdominal pain 2011    ALSO 2013    Back pain 2007    Chest pain 2011 ER visit  01 Hospitalization    Foot pain 2007    PLANTAR FASCITIS    Gastro-esophageal reflux     Heel pain 2008    LT    Obesity     MORBID WITH BMI OF 58    Rectal bleeding     Diarrhea      Past Surgical History:     Past Surgical History:   Procedure Laterality Date    COLONOSCOPY  2012    ESOPHAGOGASTRODUODENOSCOPY  2012    GASTRIC BYPASS  2012    Morbid Obesity    HERNIA REPAIR      OTHER SURGICAL HISTORY  2007    PLANTAR FASCITIS      Social History:     Social History     Socioeconomic History    Marital status: /Civil Union     Spouse name: None    Number of children: None    Years of education: None    Highest education level: None   Occupational History    None   Tobacco Use    Smoking status: Former Smoker     Quit date: 2004     Years since quittin 9    Smokeless tobacco: Never Used    Tobacco comment: USED CHANTIX TO QUIT SMOKING    Per [employer] 10/4/16 Never a smoker   Vaping Use    Vaping Use: Never used   Substance and Sexual Activity    Alcohol use: Yes     Comment: social    Drug use: No    Sexual activity: None   Other Topics Concern    None   Social History Narrative    [employer] NEVER SMOKER     Social Determinants of Health     Financial Resource Strain: Not on file   Food Insecurity: Not on file   Transportation Needs: Not on file   Physical Activity: Not on file   Stress: Not on file   Social Connections: Not on file   Intimate Partner Violence: Not on file   Housing Stability: Not on file      Family History:     Family History   Problem Relation Age of Onset    Hypertension Mother     Diabetes Father Mellitus    Hypertension Father     Coronary artery disease Father 36        Premature    Colon cancer Maternal Aunt 58    Breast cancer Maternal Aunt 58    Breast cancer Paternal Aunt 58    Diabetes Family         Melitus    Diabetes Family         Mellitus    No Known Problems Sister     No Known Problems Maternal Grandmother     No Known Problems Maternal Grandfather     No Known Problems Paternal Grandmother     No Known Problems Paternal Grandfather     No Known Problems Maternal Aunt     No Known Problems Maternal Aunt     No Known Problems Maternal Aunt     No Known Problems Maternal Aunt     No Known Problems Maternal Aunt     No Known Problems Maternal Aunt     No Known Problems Maternal Aunt     No Known Problems Paternal Aunt     No Known Problems Paternal Aunt     Breast cancer Cousin       Current Medications:     Current Outpatient Medications   Medication Sig Dispense Refill    albuterol (ProAir HFA) 90 mcg/act inhaler Inhale 2 puffs every 6 (six) hours as needed for wheezing 8 5 g 3    cyclobenzaprine (FLEXERIL) 5 mg tablet Take 1 tablet (5 mg total) by mouth daily at bedtime as needed for muscle spasms 20 tablet 0    Diclofenac Sodium (VOLTAREN) 1 % Apply 2 g topically 4 (four) times a day 1 Tube 0    fluticasone (FLONASE) 50 mcg/act nasal spray 2 sprays into each nostril as needed       loratadine (CLARITIN) 10 mg tablet Take by mouth      Multiple Vitamins-Minerals (MULTIVITAMIN ADULT) TABS Take 1 tablet by mouth daily       No current facility-administered medications for this visit  Allergies:      Allergies   Allergen Reactions    Amoxicillin Rash      Physical Exam:     /78 (BP Location: Left arm, Patient Position: Sitting, Cuff Size: Large)   Pulse 75   Temp (!) 97 4 °F (36 3 °C) (Temporal)   Resp 18   Ht 5' 5" (1 651 m)   Wt 129 kg (285 lb 6 4 oz)   LMP 05/06/2022 (Exact Date)   SpO2 100%   BMI 47 49 kg/m²     Physical Exam  Vitals and nursing note reviewed  Constitutional:       General: She is not in acute distress  Appearance: She is well-developed  She is obese  HENT:      Head: Normocephalic and atraumatic  Right Ear: Tympanic membrane, ear canal and external ear normal       Left Ear: Tympanic membrane, ear canal and external ear normal    Eyes:      Conjunctiva/sclera: Conjunctivae normal       Pupils: Pupils are equal, round, and reactive to light  Cardiovascular:      Rate and Rhythm: Normal rate and regular rhythm  Pulses: Normal pulses  Heart sounds: Normal heart sounds  No murmur heard  Pulmonary:      Effort: Pulmonary effort is normal  No respiratory distress  Breath sounds: Normal breath sounds  Abdominal:      General: Bowel sounds are normal       Palpations: Abdomen is soft  Tenderness: There is no abdominal tenderness  Musculoskeletal:         General: Normal range of motion  Cervical back: Normal range of motion and neck supple  Skin:     General: Skin is warm and dry  Neurological:      General: No focal deficit present  Mental Status: She is alert and oriented to person, place, and time  Psychiatric:         Mood and Affect: Mood normal          Behavior: Behavior normal          Thought Content:  Thought content normal          Judgment: Judgment normal           MELVIN Torre  [address]

## 2022-06-21 ENCOUNTER — LAB (OUTPATIENT)
Dept: LAB | Facility: HOSPITAL | Age: 42
End: 2022-06-21
Payer: COMMERCIAL

## 2022-06-21 DIAGNOSIS — D50.9 IRON DEFICIENCY ANEMIA, UNSPECIFIED IRON DEFICIENCY ANEMIA TYPE: ICD-10-CM

## 2022-06-21 DIAGNOSIS — D50.9 IRON DEFICIENCY ANEMIA, UNSPECIFIED IRON DEFICIENCY ANEMIA TYPE: Primary | ICD-10-CM

## 2022-06-21 DIAGNOSIS — E66.01 MORBID OBESITY WITH BMI OF 45.0-49.9, ADULT (HCC): ICD-10-CM

## 2022-06-21 DIAGNOSIS — Z00.00 ANNUAL PHYSICAL EXAM: ICD-10-CM

## 2022-06-21 LAB
ALBUMIN SERPL BCP-MCNC: 3 G/DL (ref 3.5–5)
ALP SERPL-CCNC: 72 U/L (ref 46–116)
ALT SERPL W P-5'-P-CCNC: 18 U/L (ref 12–78)
ANION GAP SERPL CALCULATED.3IONS-SCNC: 2 MMOL/L (ref 4–13)
AST SERPL W P-5'-P-CCNC: 16 U/L (ref 5–45)
BASOPHILS # BLD AUTO: 0.07 THOUSANDS/ΜL (ref 0–0.1)
BASOPHILS NFR BLD AUTO: 1 % (ref 0–1)
BILIRUB SERPL-MCNC: 0.42 MG/DL (ref 0.2–1)
BUN SERPL-MCNC: 12 MG/DL (ref 5–25)
CALCIUM ALBUM COR SERPL-MCNC: 9.2 MG/DL (ref 8.3–10.1)
CALCIUM SERPL-MCNC: 8.4 MG/DL (ref 8.3–10.1)
CHLORIDE SERPL-SCNC: 112 MMOL/L (ref 100–108)
CHOLEST SERPL-MCNC: 147 MG/DL
CO2 SERPL-SCNC: 27 MMOL/L (ref 21–32)
CREAT SERPL-MCNC: 0.58 MG/DL (ref 0.6–1.3)
EOSINOPHIL # BLD AUTO: 0.19 THOUSAND/ΜL (ref 0–0.61)
EOSINOPHIL NFR BLD AUTO: 4 % (ref 0–6)
ERYTHROCYTE [DISTWIDTH] IN BLOOD BY AUTOMATED COUNT: 15.2 % (ref 11.6–15.1)
FERRITIN SERPL-MCNC: 7 NG/ML (ref 8–388)
GFR SERPL CREATININE-BSD FRML MDRD: 113 ML/MIN/1.73SQ M
GLUCOSE P FAST SERPL-MCNC: 77 MG/DL (ref 65–99)
HCT VFR BLD AUTO: 34.8 % (ref 34.8–46.1)
HDLC SERPL-MCNC: 57 MG/DL
HGB BLD-MCNC: 10.7 G/DL (ref 11.5–15.4)
IMM GRANULOCYTES # BLD AUTO: 0.01 THOUSAND/UL (ref 0–0.2)
IMM GRANULOCYTES NFR BLD AUTO: 0 % (ref 0–2)
IRON SERPL-MCNC: 60 UG/DL (ref 50–170)
LDLC SERPL CALC-MCNC: 73 MG/DL (ref 0–100)
LYMPHOCYTES # BLD AUTO: 1.44 THOUSANDS/ΜL (ref 0.6–4.47)
LYMPHOCYTES NFR BLD AUTO: 28 % (ref 14–44)
MCH RBC QN AUTO: 26.4 PG (ref 26.8–34.3)
MCHC RBC AUTO-ENTMCNC: 30.7 G/DL (ref 31.4–37.4)
MCV RBC AUTO: 86 FL (ref 82–98)
MONOCYTES # BLD AUTO: 0.47 THOUSAND/ΜL (ref 0.17–1.22)
MONOCYTES NFR BLD AUTO: 9 % (ref 4–12)
NEUTROPHILS # BLD AUTO: 3 THOUSANDS/ΜL (ref 1.85–7.62)
NEUTS SEG NFR BLD AUTO: 58 % (ref 43–75)
NRBC BLD AUTO-RTO: 0 /100 WBCS
PLATELET # BLD AUTO: 307 THOUSANDS/UL (ref 149–390)
PMV BLD AUTO: 13.1 FL (ref 8.9–12.7)
POTASSIUM SERPL-SCNC: 3.9 MMOL/L (ref 3.5–5.3)
PROT SERPL-MCNC: 6.7 G/DL (ref 6.4–8.2)
RBC # BLD AUTO: 4.06 MILLION/UL (ref 3.81–5.12)
SODIUM SERPL-SCNC: 141 MMOL/L (ref 136–145)
TRIGL SERPL-MCNC: 84 MG/DL
TSH SERPL DL<=0.05 MIU/L-ACNC: 2.53 UIU/ML (ref 0.45–4.5)
WBC # BLD AUTO: 5.18 THOUSAND/UL (ref 4.31–10.16)

## 2022-06-21 PROCEDURE — 83540 ASSAY OF IRON: CPT

## 2022-06-21 PROCEDURE — 80061 LIPID PANEL: CPT

## 2022-06-21 PROCEDURE — 82728 ASSAY OF FERRITIN: CPT

## 2022-06-21 PROCEDURE — 85025 COMPLETE CBC W/AUTO DIFF WBC: CPT

## 2022-06-21 PROCEDURE — 84443 ASSAY THYROID STIM HORMONE: CPT

## 2022-06-21 PROCEDURE — 80053 COMPREHEN METABOLIC PANEL: CPT

## 2022-06-21 PROCEDURE — 36415 COLL VENOUS BLD VENIPUNCTURE: CPT

## 2022-07-29 ENCOUNTER — OFFICE VISIT (OUTPATIENT)
Dept: FAMILY MEDICINE CLINIC | Facility: CLINIC | Age: 42
End: 2022-07-29
Payer: COMMERCIAL

## 2022-07-29 VITALS
HEIGHT: 65 IN | RESPIRATION RATE: 18 BRPM | DIASTOLIC BLOOD PRESSURE: 82 MMHG | OXYGEN SATURATION: 99 % | WEIGHT: 279.8 LBS | SYSTOLIC BLOOD PRESSURE: 118 MMHG | BODY MASS INDEX: 46.62 KG/M2 | TEMPERATURE: 97.8 F | HEART RATE: 68 BPM

## 2022-07-29 DIAGNOSIS — M25.562 CHRONIC PAIN OF LEFT KNEE: Primary | ICD-10-CM

## 2022-07-29 DIAGNOSIS — G89.29 CHRONIC PAIN OF LEFT KNEE: Primary | ICD-10-CM

## 2022-07-29 PROCEDURE — 99213 OFFICE O/P EST LOW 20 MIN: CPT | Performed by: NURSE PRACTITIONER

## 2022-07-29 NOTE — PROGRESS NOTES
Assessment/Plan:         There are no diagnoses linked to this encounter  Subjective:      Patient ID: Dion Marquis is a 35 y o  female  HPI  Has had lower leg pain and discomfort      Most has resolved except for the bilateral knees  Left knee last night with discomfort and pulling behind when straightened  No injuries known    Going on 4 months and knees are not improving  Been doing stretching     Losing 1-2 lbs per week  The following portions of the patient's history were reviewed and updated as appropriate: allergies, current medications, past family history, past medical history, past social history, past surgical history and problem list     Review of Systems   Constitutional: Positive for activity change  Negative for appetite change, fatigue and fever  HENT: Negative for congestion and sinus pressure  Respiratory: Negative for cough  Musculoskeletal: Positive for arthralgias and gait problem  Neurological: Negative for light-headedness and numbness  Psychiatric/Behavioral: The patient is not nervous/anxious  Objective:  Vitals:    07/29/22 0929   BP: 118/82   BP Location: Left arm   Patient Position: Sitting   Cuff Size: Large   Pulse: 68   Resp: 18   Temp: 97 8 °F (36 6 °C)   TempSrc: Temporal   SpO2: 99%   Weight: 127 kg (279 lb 12 8 oz)   Height: 5' 5" (1 651 m)      Physical Exam  Vitals reviewed  Constitutional:       Appearance: Normal appearance  She is obese  Musculoskeletal:      Right knee: No swelling  No tenderness  Left knee: No swelling  Tenderness present  Legs:    Neurological:      Mental Status: She is alert

## 2022-08-15 ENCOUNTER — HOSPITAL ENCOUNTER (OUTPATIENT)
Dept: RADIOLOGY | Facility: HOSPITAL | Age: 42
Discharge: HOME | End: 2022-08-15
Payer: COMMERCIAL

## 2022-08-15 DIAGNOSIS — M25.562 CHRONIC PAIN OF LEFT KNEE: ICD-10-CM

## 2022-08-15 DIAGNOSIS — G89.29 CHRONIC PAIN OF LEFT KNEE: ICD-10-CM

## 2022-08-15 PROCEDURE — 73562 X-RAY EXAM OF KNEE 3: CPT

## 2022-08-16 ENCOUNTER — OFFICE VISIT (OUTPATIENT)
Dept: OBGYN CLINIC | Facility: MEDICAL CENTER | Age: 42
End: 2022-08-16
Payer: COMMERCIAL

## 2022-08-16 VITALS
BODY MASS INDEX: 45.98 KG/M2 | DIASTOLIC BLOOD PRESSURE: 81 MMHG | WEIGHT: 276 LBS | SYSTOLIC BLOOD PRESSURE: 138 MMHG | HEART RATE: 61 BPM | HEIGHT: 65 IN

## 2022-08-16 DIAGNOSIS — M25.561 CHRONIC PAIN OF BOTH KNEES: Primary | ICD-10-CM

## 2022-08-16 DIAGNOSIS — M17.0 PRIMARY OSTEOARTHRITIS OF BOTH KNEES: ICD-10-CM

## 2022-08-16 DIAGNOSIS — M70.50 PES ANSERINE BURSITIS: ICD-10-CM

## 2022-08-16 DIAGNOSIS — M25.562 CHRONIC PAIN OF BOTH KNEES: Primary | ICD-10-CM

## 2022-08-16 DIAGNOSIS — M22.2X1 PATELLOFEMORAL DISORDER OF RIGHT KNEE: ICD-10-CM

## 2022-08-16 DIAGNOSIS — M22.2X2 PATELLOFEMORAL DISORDER OF LEFT KNEE: ICD-10-CM

## 2022-08-16 DIAGNOSIS — G89.29 CHRONIC PAIN OF BOTH KNEES: Primary | ICD-10-CM

## 2022-08-16 PROCEDURE — 20610 DRAIN/INJ JOINT/BURSA W/O US: CPT | Performed by: STUDENT IN AN ORGANIZED HEALTH CARE EDUCATION/TRAINING PROGRAM

## 2022-08-16 PROCEDURE — 99213 OFFICE O/P EST LOW 20 MIN: CPT | Performed by: STUDENT IN AN ORGANIZED HEALTH CARE EDUCATION/TRAINING PROGRAM

## 2022-08-16 RX ORDER — BUPIVACAINE HYDROCHLORIDE 5 MG/ML
1 INJECTION, SOLUTION PERINEURAL
Status: COMPLETED | OUTPATIENT
Start: 2022-08-16 | End: 2022-08-16

## 2022-08-16 RX ORDER — TRIAMCINOLONE ACETONIDE 40 MG/ML
20 INJECTION, SUSPENSION INTRA-ARTICULAR; INTRAMUSCULAR
Status: COMPLETED | OUTPATIENT
Start: 2022-08-16 | End: 2022-08-16

## 2022-08-16 RX ADMIN — TRIAMCINOLONE ACETONIDE 20 MG: 40 INJECTION, SUSPENSION INTRA-ARTICULAR; INTRAMUSCULAR at 08:51

## 2022-08-16 RX ADMIN — BUPIVACAINE HYDROCHLORIDE 1 ML: 5 INJECTION, SOLUTION PERINEURAL at 08:51

## 2022-08-16 NOTE — PROGRESS NOTES
1  Chronic pain of both knees  Ambulatory Referral to Physical Therapy   2  Primary osteoarthritis of both knees  Ambulatory Referral to Physical Therapy   3  Pes anserine bursitis  Ambulatory Referral to Physical Therapy   4  Patellofemoral disorder of left knee  Ambulatory Referral to Physical Therapy   5  Patellofemoral disorder of right knee  Ambulatory Referral to Physical Therapy     Orders Placed This Encounter   Procedures    Ambulatory Referral to Physical Therapy        Imaging Studies (I personally reviewed images in PACS and report):    · X-ray left knee 08/15/2022:  No acute osseous abnormalities  Mild-to-moderate tricompartmental osteoarthritic changes most prominent over her patellofemoral joint line  · X-ray right knee 09/22/2021: No acute osseous abnormalities  There is mild to moderate tricompartmental osteoarthritic changes as evidenced by joint space narrowing, osteophyte formation subchondral sclerosis  · Venous duplex of right lower extremity 09/22/2021: No evidence of acute/ chronic DVT  · Ultrasound right lower extremity of the popliteal fossa 08/28/2021: No evidence of Baker cyst   Popliteal artery and vein are grossly unremarkable  IMPRESSION:  · Acute bilateral knee pain without a precipitating injury  · 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 and walking throughout the day for several hours as opposed to her prior job in office work  · Primary osteoarthritis of bilateral knees, 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-pain is mostly localized over pes anserine bilaterally today, patellofemoral pain  · Some/limited improvement with topical NSAIDs and oral acetaminophen, rest     Other factors:  · BMI 46      History of gastric bypass and is contraindicated from taking oral NSAIDs  PLAN:     Clinical exam and radiographic imaging reviewed with patient today, with impression as per above   I have discussed with the patient the pathophysiology of this diagnosis and reviewed how the examination correlates with this diagnosis   I reviewed imaging of her left knee today as noted above   I counseled that is likely her job that is exacerbating this issue given she is much more active  I counseled that has been more than 3 months since her last cortisone injection I did offer her bilateral pes anserine cortisone injections today which she was agreeable as per procedure note below  I will defer intra-articular knee injections today if she does not have improvement with procedure done today   Furthermore, in regards to the syndrome as well as her knee stiffness patient was agreeable to a referral to formal physical therapy which was placed today  I counseled at minimum of 4-6 weeks before transitioning to a home exercise program    I encouraged her to continue working on appropriate methods of weight loss going forward as this is also a contributing factor to the pain of her knees  Return in about 3 weeks (around 9/6/2022)  CHIEF COMPLAINT:  Chief Complaint   Patient presents with    Left Knee - Pain    Right Knee - Pain         HPI:  Jenn Vargas is a 35 y o  female  who presents for     Visit 8/16/2022:  Initial evaluation of bilateral knee pain:  I had previously seen this patient in November for right knee pain that was suspected to be from primary osteoarthritis as well as pes anserine syndrome  She did have improvement status post cortisone injection of the pes anserine and her right knee joint  Of note she recently saw her PCP which she would been complaining of left knee pain and had imaging done of her left knee as noted above  She believes it is due to a transition from her occupation as an  to his CNA where she is ambulating much more than at baseline    She states the pain of her right knee is similar to which she had in the past but mostly localized over the medial proximal tibial aspect of her knee and points to her pes anserine aspect of her knee  Described as a sharp/tight/aching pain moderate intensity  Pain is worse towards the end of the day as well as the beginning of the day  She states she has a similar pain developing in her left knee as well  In regards to pain control she has been using topical Voltaren and taking Tylenol all which provided limited relief  She also reports significant knee stiffness in the beginning of the day that does not improve until she gets going which helps loosen her knees  She is interested in whether she is allowed to have a repeat cortisone injection which she had significant relief in the past   She is also open to attending formal physical therapy to prevent recurrence of this pain in the future  She is actively working on reducing her BMI as well as she is aware that this is a contributing factor  Medical, Surgical, Family, and Social History    Past Medical History:   Diagnosis Date    Abdominal pain 2011    ALSO 2013    Back pain 2007    Chest pain 2011 ER visit  01 Hospitalization    Foot pain 2007    PLANTAR FASCITIS    Gastro-esophageal reflux     Heel pain 2008    LT    Obesity     MORBID WITH BMI OF 58    Rectal bleeding     Diarrhea     Past Surgical History:   Procedure Laterality Date    COLONOSCOPY  2012    ESOPHAGOGASTRODUODENOSCOPY  2012    GASTRIC BYPASS  2012    Morbid Obesity    HERNIA REPAIR      OTHER SURGICAL HISTORY  2007    PLANTAR FASCITIS     Social History   Social History     Substance and Sexual Activity   Alcohol Use Yes    Comment: social     Social History     Substance and Sexual Activity   Drug Use No     Social History     Tobacco Use   Smoking Status Former Smoker    Quit date: 2004    Years since quittin 1   Smokeless Tobacco Never Used   Tobacco Comment    USED CHANTIX TO QUIT SMOKING    Per [employer] 10/4/16 Never a smoker Family History   Problem Relation Age of Onset    Hypertension Mother     Diabetes Father         Mellitus    Hypertension Father     Coronary artery disease Father 36        Premature    Colon cancer Maternal Aunt 58    Breast cancer Maternal Aunt 58    Breast cancer Paternal Aunt 58    Diabetes Family         Melitus    Diabetes Family         Mellitus    No Known Problems Sister     No Known Problems Maternal Grandmother     No Known Problems Maternal Grandfather     No Known Problems Paternal Grandmother     No Known Problems Paternal Grandfather     No Known Problems Maternal Aunt     No Known Problems Maternal Aunt     No Known Problems Maternal Aunt     No Known Problems Maternal Aunt     No Known Problems Maternal Aunt     No Known Problems Maternal Aunt     No Known Problems Maternal Aunt     No Known Problems Paternal Aunt     No Known Problems Paternal Aunt     Breast cancer Cousin      Allergies   Allergen Reactions    Amoxicillin Rash          Physical Exam  /81   Pulse 61   Ht 5' 5" (1 651 m)   Wt 125 kg (276 lb)   BMI 45 93 kg/m²     Constitutional:  see vital signs  Gen: obese (BMI 75 8), normocephalic/atraumatic, well-groomed  Eyes: No inflammation or discharge of conjunctiva or lids; sclera clear   Pharynx: no inflammation, lesion, or mass of lips  Neck: supple, no masses, non-distended  MSK: no inflammation, lesion, mass, or clubbing of nails and digits except for other than mentioned below  SKIN: no visible rashes or skin lesions  Pulmonary/Chest: Effort normal  No respiratory distress         Ortho Exam  Right Knee Exam:  Erythema: no  Swelling: no (limited due to patient body habitus)  Increased Warmth: no  Tenderness: +Mildly over MJL, +significant over pes anserine aspect of knee  ROM: 0-110   Patellar Apprehension: negative  Patellar Grind: negative  Anterior Drawer: negative  Varus laxity: negative  Valgus laxity: negative  Ifrah: negative     Right Knee Exam:  Erythema: no  Swelling: no (limited due to patient body habitus)  Increased Warmth: no  Tenderness: +Mildly over MJL, +significant over pes anserine aspect of knee  ROM: 0-110   Patellar Apprehension: negative  Patellar Grind: negative  Anterior Drawer: negative  Varus laxity: negative  Valgus laxity: negative  Ifrah: negative     Large joint arthrocentesis: bilateral pes anserine bursa  Universal Protocol:  Consent: Verbal consent obtained  Risks and benefits: risks, benefits and alternatives were discussed  Consent given by: patient  Patient understanding: patient states understanding of the procedure being performed  Site marked: the operative site was marked  Radiology Images displayed and confirmed   If images not available, report reviewed: imaging studies available  Patient identity confirmed: verbally with patient    Supporting Documentation  Indications: pain   Procedure Details  Location: knee - bilateral pes anserine bursa  Preparation: Patient was prepped and draped in the usual sterile fashion  Needle size: 25 G  Ultrasound guidance: no  Approach: anterolateral    Medications (Right): 1 mL bupivacaine 0 5 %; 20 mg triamcinolone acetonide 40 mg/mLMedications (Left): 1 mL bupivacaine 0 5 %; 20 mg triamcinolone acetonide 40 mg/mL   Patient tolerance: patient tolerated the procedure well with no immediate complications  Dressing:  Sterile dressing applied

## 2022-08-16 NOTE — PATIENT INSTRUCTIONS

## 2022-08-16 NOTE — LETTER
August 16, 2022     Patient: Anoop Dixon  YOB: 1980  Date of Visit: 8/16/2022      To Whom it May Concern:    Anoop Zack is under my professional care  Deedee Presley was seen in my office on 8/16/2022  Please excuse her from work on 8/15/2022  She can return to work without restrictions on 8/17/2022  She will be referred to physical therapy for further treatment  She will follow up in approximately 3 weeks for re-evaluation  If you have any questions or concerns, please don't hesitate to call           Sincerely,          Olu Santiago MD        CC: Anoop Dixon

## 2022-09-05 ENCOUNTER — TELEPHONE (OUTPATIENT)
Dept: OTHER | Facility: OTHER | Age: 42
End: 2022-09-05

## 2022-09-05 NOTE — TELEPHONE ENCOUNTER
Patient requested to cancel appointment scheduled for 9/6/2022  Reported she was advised by the provider  to cancel appointment if she was not having any issues  Reported she will call the office with any changes  Appointment cancelled

## 2023-08-31 ENCOUNTER — OFFICE VISIT (OUTPATIENT)
Dept: FAMILY MEDICINE CLINIC | Facility: CLINIC | Age: 43
End: 2023-08-31
Payer: COMMERCIAL

## 2023-08-31 VITALS
WEIGHT: 290.6 LBS | TEMPERATURE: 97.4 F | SYSTOLIC BLOOD PRESSURE: 120 MMHG | HEIGHT: 65 IN | DIASTOLIC BLOOD PRESSURE: 80 MMHG | BODY MASS INDEX: 48.42 KG/M2 | OXYGEN SATURATION: 100 % | HEART RATE: 64 BPM

## 2023-08-31 DIAGNOSIS — J30.9 ALLERGIC RHINITIS, UNSPECIFIED SEASONALITY, UNSPECIFIED TRIGGER: ICD-10-CM

## 2023-08-31 DIAGNOSIS — M54.2 NECK PAIN: ICD-10-CM

## 2023-08-31 DIAGNOSIS — G89.29 CHRONIC LEFT SHOULDER PAIN: ICD-10-CM

## 2023-08-31 DIAGNOSIS — E87.6 HYPOKALEMIA: ICD-10-CM

## 2023-08-31 DIAGNOSIS — G47.33 OBSTRUCTIVE SLEEP APNEA: ICD-10-CM

## 2023-08-31 DIAGNOSIS — Z13.1 SCREENING FOR DIABETES MELLITUS: ICD-10-CM

## 2023-08-31 DIAGNOSIS — M62.838 MUSCLE SPASM: ICD-10-CM

## 2023-08-31 DIAGNOSIS — K56.609 SMALL BOWEL OBSTRUCTION (HCC): ICD-10-CM

## 2023-08-31 DIAGNOSIS — F41.9 ANXIETY: ICD-10-CM

## 2023-08-31 DIAGNOSIS — Z00.00 ANNUAL PHYSICAL EXAM: Primary | ICD-10-CM

## 2023-08-31 DIAGNOSIS — Z12.31 ENCOUNTER FOR SCREENING MAMMOGRAM FOR MALIGNANT NEOPLASM OF BREAST: ICD-10-CM

## 2023-08-31 DIAGNOSIS — K21.9 GASTROESOPHAGEAL REFLUX DISEASE, UNSPECIFIED WHETHER ESOPHAGITIS PRESENT: ICD-10-CM

## 2023-08-31 DIAGNOSIS — K43.2 INCISIONAL HERNIA, WITHOUT OBSTRUCTION OR GANGRENE: ICD-10-CM

## 2023-08-31 DIAGNOSIS — Z13.6 SCREENING FOR CARDIOVASCULAR CONDITION: ICD-10-CM

## 2023-08-31 DIAGNOSIS — J45.20 MILD INTERMITTENT ASTHMA WITHOUT COMPLICATION: ICD-10-CM

## 2023-08-31 DIAGNOSIS — Z98.84 H/O GASTRIC BYPASS: ICD-10-CM

## 2023-08-31 DIAGNOSIS — M25.512 CHRONIC LEFT SHOULDER PAIN: ICD-10-CM

## 2023-08-31 DIAGNOSIS — N93.9 ABNORMAL UTERINE BLEEDING (AUB): ICD-10-CM

## 2023-08-31 PROCEDURE — 99214 OFFICE O/P EST MOD 30 MIN: CPT | Performed by: FAMILY MEDICINE

## 2023-08-31 PROCEDURE — 99396 PREV VISIT EST AGE 40-64: CPT | Performed by: FAMILY MEDICINE

## 2023-08-31 RX ORDER — CYCLOBENZAPRINE HCL 5 MG
5 TABLET ORAL
Qty: 20 TABLET | Refills: 0 | Status: SHIPPED | OUTPATIENT
Start: 2023-08-31

## 2023-08-31 RX ORDER — FLUOXETINE 10 MG/1
10 CAPSULE ORAL DAILY
Qty: 30 CAPSULE | Refills: 2 | Status: SHIPPED | OUTPATIENT
Start: 2023-08-31

## 2023-08-31 RX ORDER — ALBUTEROL SULFATE 90 UG/1
2 AEROSOL, METERED RESPIRATORY (INHALATION) EVERY 6 HOURS PRN
Qty: 8.5 G | Refills: 3 | Status: SHIPPED | OUTPATIENT
Start: 2023-08-31

## 2023-08-31 NOTE — ASSESSMENT & PLAN NOTE
Worsening  · Uses flonase as needed and and loratadine daily  · Patient has deviated septum which increases her chances of developing rhinosinusitis  · Continue current treatment and saline irrigation  · If symptoms worsen return to office for further evaluation

## 2023-08-31 NOTE — ASSESSMENT & PLAN NOTE
Trigger is seasonal allergies and cold air  Ongoing since 2014  Symptoms improve with albuterol  No PFT's on file  Refill for albuterol provided

## 2023-08-31 NOTE — ASSESSMENT & PLAN NOTE
· Palpable hernia to the right of previous midline abdominal incision  · We will evaluate further with CT abdomen pelvis with contrast since patient had history of SBO and gastric bypass.

## 2023-08-31 NOTE — ASSESSMENT & PLAN NOTE
· Worsening in the setting of job stress  · Previously on Prozac and agreeable to restart  · Start Prozac 10 mg daily

## 2023-08-31 NOTE — PATIENT INSTRUCTIONS
Call sleep medicine to follow up at [phone]  Wellness Visit for Adults   AMBULATORY CARE:   A wellness visit  is when you see your healthcare provider to get screened for health problems. Your healthcare provider will also give you advice on how to stay healthy. Write down your questions so you remember to ask them. Ask your healthcare provider how often you should have a wellness visit. What happens at a wellness visit:  Your healthcare provider will ask about your health, and your family history of health problems. This includes high blood pressure, heart disease, and cancer. He or she will ask if you have symptoms that concern you, if you smoke, and about your mood. You may also be asked about your intake of medicines, supplements, food, and alcohol. Any of the following may be done: Your weight  will be checked. Your height may also be checked so your body mass index (BMI) can be calculated. Your BMI shows if you are at a healthy weight. Your blood pressure  and heart rate will be checked. Your temperature may also be checked. Blood and urine tests  may be done. Blood tests may be done to check your cholesterol levels. Abnormal cholesterol levels increase your risk for heart disease and stroke. You may also need a blood or urine test to check for diabetes if you are at increased risk. Urine tests may be done to look for signs of an infection or kidney disease. A physical exam  includes checking your heartbeat and lungs with a stethoscope. Your healthcare provider may also check your skin to look for sun damage. Screening tests  may be recommended. A screening test is done to check for diseases that may not cause symptoms. The screening tests you may need depend on your age, gender, family history, and lifestyle habits. For example, colorectal screening may be recommended if you are 48years old or older.     Screening tests you need if you are a woman:   A Pap smear  is used to screen for cervical cancer. Pap smears are usually done every 3 to 5 years depending on your age. You may need them more often if you have had abnormal Pap smear test results in the past. Ask your healthcare provider how often you should have a Pap smear. A mammogram  is an x-ray of your breasts to screen for breast cancer. Experts recommend mammograms every 2 years starting at age 48 years. You may need a mammogram at age 52 years or younger if you have an increased risk for breast cancer. Talk to your healthcare provider about when you should start having mammograms and how often you need them. Vaccines you may need:   Get an influenza vaccine  every year. The influenza vaccine protects you from the flu. Several types of viruses cause the flu. The viruses change over time, so new vaccines are made each year. Get a tetanus-diphtheria (Td) booster vaccine  every 10 years. This vaccine protects you against tetanus and diphtheria. Tetanus is a severe infection that may cause painful muscle spasms and lockjaw. Diphtheria is a severe bacterial infection that causes a thick covering in the back of your mouth and throat. Get a human papillomavirus (HPV) vaccine  if you are female and aged 23 to 32 or male 23 to 24 and never received it. This vaccine protects you from HPV infection. HPV is the most common infection spread by sexual contact. HPV may also cause vaginal, penile, and anal cancers. Get a pneumococcal vaccine  if you are aged 72 years or older. The pneumococcal vaccine is an injection given to protect you from pneumococcal disease. Pneumococcal disease is an infection caused by pneumococcal bacteria. The infection may cause pneumonia, meningitis, or an ear infection. Get a shingles vaccine  if you are 60 or older, even if you have had shingles before. The shingles vaccine is an injection to protect you from the varicella-zoster virus. This is the same virus that causes chickenpox.  Shingles is a painful rash that develops in people who had chickenpox or have been exposed to the virus. How to eat healthy:  My Plate is a model for planning healthy meals. It shows the types and amounts of foods that should go on your plate. Fruits and vegetables make up about half of your plate, and grains and protein make up the other half. A serving of dairy is included on the side of your plate. The amount of calories and serving sizes you need depends on your age, gender, weight, and height. Examples of healthy foods are listed below:  Eat a variety of vegetables  such as dark green, red, and orange vegetables. You can also include canned vegetables low in sodium (salt) and frozen vegetables without added butter or sauces. Eat a variety of fresh fruits , canned fruit in 100% juice, frozen fruit, and dried fruit. Include whole grains. At least half of the grains you eat should be whole grains. Examples include whole-wheat bread, wheat pasta, brown rice, and whole-grain cereals such as oatmeal.    Eat a variety of protein foods such as seafood (fish and shellfish), lean meat, and poultry without skin (turkey and chicken). Examples of lean meats include pork leg, shoulder, or tenderloin, and beef round, sirloin, tenderloin, and extra lean ground beef. Other protein foods include eggs and egg substitutes, beans, peas, soy products, nuts, and seeds. Choose low-fat dairy products such as skim or 1% milk or low-fat yogurt, cheese, and cottage cheese. Limit unhealthy fats  such as butter, hard margarine, and shortening. Exercise:  Exercise at least 30 minutes per day on most days of the week. Some examples of exercise include walking, biking, dancing, and swimming. You can also fit in more physical activity by taking the stairs instead of the elevator or parking farther away from stores. Include muscle strengthening activities 2 days each week. Regular exercise provides many health benefits.  It helps you manage your weight, and decreases your risk for type 2 diabetes, heart disease, stroke, and high blood pressure. Exercise can also help improve your mood. Ask your healthcare provider about the best exercise plan for you. General health and safety guidelines:   Do not smoke. Nicotine and other chemicals in cigarettes and cigars can cause lung damage. Ask your healthcare provider for information if you currently smoke and need help to quit. E-cigarettes or smokeless tobacco still contain nicotine. Talk to your healthcare provider before you use these products. Limit alcohol. A drink of alcohol is 12 ounces of beer, 5 ounces of wine, or 1½ ounces of liquor. Lose weight, if needed. Being overweight increases your risk of certain health conditions. These include heart disease, high blood pressure, type 2 diabetes, and certain types of cancer. Protect your skin. Do not sunbathe or use tanning beds. Use sunscreen with a SPF 15 or higher. Apply sunscreen at least 15 minutes before you go outside. Reapply sunscreen every 2 hours. Wear protective clothing, hats, and sunglasses when you are outside. Drive safely. Always wear your seatbelt. Make sure everyone in your car wears a seatbelt. A seatbelt can save your life if you are in an accident. Do not use your cell phone when you are driving. This could distract you and cause an accident. Pull over if you need to make a call or send a text message. Practice safe sex. Use latex condoms if are sexually active and have more than one partner. Your healthcare provider may recommend screening tests for sexually transmitted infections (STIs). Wear helmets, lifejackets, and protective gear. Always wear a helmet when you ride a bike or motorcycle, go skiing, or play sports that could cause a head injury. Wear protective equipment when you play sports. Wear a lifejacket when you are on a boat or doing water sports.     © Copyright [employer] 2022 Information is for End User's use only and may not be sold, redistributed or otherwise used for commercial purposes. The above information is an  only. It is not intended as medical advice for individual conditions or treatments. Talk to your doctor, nurse or pharmacist before following any medical regimen to see if it is safe and effective for you.

## 2023-08-31 NOTE — ASSESSMENT & PLAN NOTE
· Ongoing for the past 1-2 weeks  · Helps with volteren, heating pad, and tylenol but symptoms recur  · Has been doing neck exercises  · Refill on Flexeril provided  · We will also treat underlying anxiety

## 2023-08-31 NOTE — ASSESSMENT & PLAN NOTE
· Was undergoing evaluation but developed a SBO and did not complete in office sleep study  · Recommend calling sleep medicine to schedule sleep study

## 2023-08-31 NOTE — PROGRESS NOTES
[address] [facility]    NAME: Ayad Lai  AGE: 38 y.o.  SEX: female  : 1980     DATE: 2023     Assessment and Plan:     Problem List Items Addressed This Visit        Digestive    Gastroesophageal reflux disease     Stable and well-controlled         Small bowel obstruction ([address])     In   Has since resolved            Respiratory    Allergic rhinitis     Worsening  Uses flonase as needed and and loratadine daily  Patient has deviated septum which increases her chances of developing rhinosinusitis  Continue current treatment and saline irrigation  If symptoms worsen return to office for further evaluation         Relevant Medications    albuterol (ProAir HFA) 90 mcg/act inhaler    Obstructive sleep apnea     Was undergoing evaluation but developed a SBO and did not complete in office sleep study  Recommend calling sleep medicine to schedule sleep study         Mild intermittent asthma without complication     Trigger is seasonal allergies and cold air  Ongoing since   Symptoms improve with albuterol  No PFT's on file  Refill for albuterol provided         Relevant Medications    albuterol (ProAir HFA) 90 mcg/act inhaler       Genitourinary    Abnormal uterine bleeding (AUB)     No further episodes  Normal menstrual cycle         Relevant Orders    CBC and Platelet       Other    Anxiety     Worsening in the setting of job stress  Previously on Prozac and agreeable to restart  Start Prozac 10 mg daily         Relevant Medications    FLUoxetine (PROzac) 10 mg capsule    H/O gastric bypass     Due for repeat CBC and iron panel as well as vitamin D         Relevant Orders    Vitamin D 25 hydroxy    Iron Panel (Includes Ferritin, Iron Sat%, Iron, and TIBC)    CT abdomen pelvis w contrast    Hypokalemia    Neck pain     Ongoing for the past 1-2 weeks  Helps with volteren, heating pad, and tylenol but symptoms recur  Has been doing neck exercises  Refill on Flexeril provided  We will also treat underlying anxiety           Relevant Medications    cyclobenzaprine (FLEXERIL) 5 mg tablet    Incisional hernia, without obstruction or gangrene     Palpable hernia to the right of previous midline abdominal incision  We will evaluate further with CT abdomen pelvis with contrast since patient had history of SBO and gastric bypass. Relevant Orders    CT abdomen pelvis w contrast   Other Visit Diagnoses     Annual physical exam    -  Primary    Screening for diabetes mellitus        Relevant Orders    Comprehensive metabolic panel    Screening for cardiovascular condition        Relevant Orders    Lipid panel    Muscle spasm        Relevant Medications    cyclobenzaprine (FLEXERIL) 5 mg tablet    Chronic left shoulder pain        Relevant Medications    cyclobenzaprine (FLEXERIL) 5 mg tablet    Encounter for screening mammogram for malignant neoplasm of breast        Relevant Orders    Mammo screening bilateral w 3d & cad          Immunizations and preventive care screenings were discussed with patient today. Appropriate education was printed on patient's after visit summary. Counseling:  Alcohol/drug use: discussed moderation in alcohol intake, the recommendations for healthy alcohol use, and avoidance of illicit drug use. Dental Health: discussed importance of regular tooth brushing, flossing, and dental visits. Injury prevention: discussed safety/seat belts, safety helmets, smoke detectors, carbon dioxide detectors, and smoking near bedding or upholstery. Sexual health: discussed sexually transmitted diseases, partner selection, use of condoms, avoidance of unintended pregnancy, and contraceptive alternatives. Exercise: the importance of regular exercise/physical activity was discussed. Recommend exercise 3-5 times per week for at least 30 minutes.        Depression Screening and Follow-up Plan: Patient was screened for depression during today's encounter. They screened negative with a PHQ-2 score of 0. No follow-ups on file. Chief Complaint:     Chief Complaint   Patient presents with   • Physical Exam     Left ear pain, neck pain   Lump on left side of abdomen      History of Present Illness:     Adult Annual Physical   Patient here for a comprehensive physical exam. The patient reports problems - Sinus congestion, abdominal mass, neck pain. Diet and Physical Activity  Diet/Nutrition: well balanced diet and consuming 3-5 servings of fruits/vegetables daily. Exercise: moderate cardiovascular exercise, 3-4 times a week on average and less than 30 minutes on average. Depression Screening  PHQ-2/9 Depression Screening    Little interest or pleasure in doing things: 0 - not at all  Feeling down, depressed, or hopeless: 0 - not at all  PHQ-2 Score: 0  PHQ-2 Interpretation: Negative depression screen       General Health  Sleep: sleeps poorly, gets 7-8 hours of sleep on average, snores loudly and experiences daytime hypersomnolence. Hearing: normal - bilateral.  Vision: goes for regular eye exams, most recent eye exam <1 year ago and wears glasses. Dental: regular dental visits, no dental visits for >1 year and brushes teeth once daily. /GYN Health  Patient is: premenopausal     Review of Systems:     Review of Systems   Constitutional: Negative for activity change, chills, diaphoresis, fatigue and fever. HENT: Positive for ear discharge, postnasal drip, rhinorrhea, sinus pressure and sinus pain. Negative for ear pain, hearing loss, sneezing and sore throat. Respiratory: Negative for cough, chest tightness, shortness of breath and wheezing. Cardiovascular: Negative for chest pain, palpitations and leg swelling. Gastrointestinal: Negative for abdominal pain, blood in stool, constipation, diarrhea, nausea and vomiting. Genitourinary: Negative for dysuria, frequency, hematuria and urgency. Musculoskeletal: Positive for neck pain and neck stiffness. Negative for arthralgias and myalgias. Neurological: Positive for headaches. Negative for dizziness, syncope, weakness, light-headedness and numbness. Psychiatric/Behavioral: The patient is nervous/anxious. Past Medical History:     Past Medical History:   Diagnosis Date   • Abdominal pain 2011    ALSO 2013   • Allergic     Yes   • Anxiety 2023   • Arthritis     Knees   • Back pain 2007   • Chest pain 2011 ER visit. 01 Hospitalization   • Foot pain 2007    PLANTAR FASCITIS   • Gastro-esophageal reflux    • GERD (gastroesophageal reflux disease)    • Heel pain 2008    LT   • Obesity     MORBID WITH BMI OF 58   • Rectal bleeding     Diarrhea      Past Surgical History:     Past Surgical History:   Procedure Laterality Date   • CHOLECYSTECTOMY     • COLONOSCOPY  2012   • ESOPHAGOGASTRODUODENOSCOPY  2012   • GASTRIC BYPASS  2012    Morbid Obesity   • HERNIA REPAIR     • OTHER SURGICAL HISTORY  2007    PLANTAR FASCITIS   • SMALL INTESTINE SURGERY      Scar tissue removed from small intestine upper left      Social History:     Social History     Socioeconomic History   • Marital status: /Civil Union     Spouse name: None   • Number of children: None   • Years of education: None   • Highest education level: None   Occupational History   • None   Tobacco Use   • Smoking status: Former     Packs/day: 2.00     Types: Cigarettes     Quit date: 2004     Years since quittin.1   • Smokeless tobacco: Never   • Tobacco comments:     USED CHANTIX TO QUIT SMOKING . Per [employer] 10/4/16 Never a smoker   Vaping Use   • Vaping Use: Never used   Substance and Sexual Activity   • Alcohol use:  Yes     Alcohol/week: 1.0 standard drink of alcohol     Types: 1 Glasses of wine per week     Comment: social   • Drug use: No   • Sexual activity: Yes     Partners: Male     Birth control/protection: None   Other Topics Concern   • None   Social History Narrative    [employer] NEVER SMOKER     Social Determinants of Health     Financial Resource Strain: Not on file   Food Insecurity: Not on file   Transportation Needs: Not on file   Physical Activity: Not on file   Stress: Not on file   Social Connections: Not on file   Intimate Partner Violence: Not on file   Housing Stability: Not on file      Family History:     Family History   Problem Relation Age of Onset   • Hypertension Mother    • Arthritis Mother    • Diabetes Father         Mellitus   • Hypertension Father    • Coronary artery disease Father 36        Premature   • COPD Father    • Colon cancer Maternal Aunt 62   • Breast cancer Maternal Aunt 58   • Breast cancer Paternal Aunt 58   • Diabetes Family         Melitus   • Diabetes Family         Mellitus   • No Known Problems Sister    • No Known Problems Maternal Grandmother    • No Known Problems Maternal Grandfather    • No Known Problems Paternal Grandmother    • No Known Problems Paternal Grandfather    • No Known Problems Maternal Aunt    • No Known Problems Maternal Aunt    • No Known Problems Maternal Aunt    • No Known Problems Maternal Aunt    • No Known Problems Maternal Aunt    • No Known Problems Maternal Aunt    • No Known Problems Maternal Aunt    • No Known Problems Paternal Aunt    • No Known Problems Paternal Aunt    • Breast cancer Cousin       Current Medications:     Current Outpatient Medications   Medication Sig Dispense Refill   • albuterol (ProAir HFA) 90 mcg/act inhaler Inhale 2 puffs every 6 (six) hours as needed for wheezing 8.5 g 3   • cyclobenzaprine (FLEXERIL) 5 mg tablet Take 1 tablet (5 mg total) by mouth daily at bedtime as needed for muscle spasms 20 tablet 0   • Diclofenac Sodium (VOLTAREN) 1 % Apply 2 g topically 4 (four) times a day 1 Tube 0   • FLUoxetine (PROzac) 10 mg capsule Take 1 capsule (10 mg total) by mouth daily 30 capsule 2   • fluticasone (FLONASE) 50 mcg/act nasal spray 2 sprays into each nostril as needed      • loratadine (CLARITIN) 10 mg tablet Take by mouth     • Multiple Vitamins-Minerals (MULTIVITAMIN ADULT) TABS Take 1 tablet by mouth daily       No current facility-administered medications for this visit. Allergies: Allergies   Allergen Reactions   • Amoxicillin Rash      Physical Exam:     /80 (BP Location: Left arm, Patient Position: Sitting, Cuff Size: Large)   Pulse 64   Temp (!) 97.4 °F (36.3 °C) (Temporal)   Ht 5' 4.5" (1.638 m)   Wt 132 kg (290 lb 9.6 oz)   SpO2 100%   BMI 49.11 kg/m²     Physical Exam  Constitutional:       General: She is not in acute distress. Appearance: She is well-developed. She is not diaphoretic. HENT:      Head: Normocephalic and atraumatic. Nose: Congestion and rhinorrhea present. Mouth/Throat:      Pharynx: No oropharyngeal exudate. Eyes:      Pupils: Pupils are equal, round, and reactive to light. Neck:      Vascular: No JVD. Cardiovascular:      Rate and Rhythm: Normal rate and regular rhythm. Heart sounds: Normal heart sounds. No murmur heard. No friction rub. No gallop. Pulmonary:      Effort: Pulmonary effort is normal. No respiratory distress. Breath sounds: Normal breath sounds. No wheezing or rales. Chest:      Chest wall: No tenderness. Abdominal:      General: Bowel sounds are normal. There is no distension. Palpations: Abdomen is soft. There is mass. Tenderness: There is no abdominal tenderness. There is no guarding or rebound. Hernia: A hernia is present. Hernia is present in the ventral area. Musculoskeletal:         General: Normal range of motion. Cervical back: Spasms and tenderness present. Lymphadenopathy:      Cervical: No cervical adenopathy. Skin:     General: Skin is warm and dry. Neurological:      Mental Status: She is alert and oriented to person, place, and time.       Cranial Nerves: No cranial nerve deficit.    Psychiatric:         Behavior: Behavior normal.          Shahram Rice, [address]

## 2024-01-19 DIAGNOSIS — F41.9 ANXIETY: ICD-10-CM

## 2024-01-22 RX ORDER — FLUOXETINE 10 MG/1
10 CAPSULE ORAL DAILY
Qty: 30 CAPSULE | Refills: 0 | Status: SHIPPED | OUTPATIENT
Start: 2024-01-22

## 2024-01-24 DIAGNOSIS — K43.2 INCISIONAL HERNIA, WITHOUT OBSTRUCTION OR GANGRENE: Primary | ICD-10-CM

## 2024-02-17 ENCOUNTER — HOSPITAL ENCOUNTER (OUTPATIENT)
Dept: RADIOLOGY | Facility: HOSPITAL | Age: 44
Discharge: HOME | End: 2024-02-17
Payer: COMMERCIAL

## 2024-02-17 DIAGNOSIS — K43.2 INCISIONAL HERNIA, WITHOUT OBSTRUCTION OR GANGRENE: ICD-10-CM

## 2024-02-17 PROCEDURE — 76705 ECHO EXAM OF ABDOMEN: CPT

## 2024-02-23 DIAGNOSIS — F41.9 ANXIETY: ICD-10-CM

## 2024-02-23 RX ORDER — FLUOXETINE 10 MG/1
10 CAPSULE ORAL DAILY
Qty: 30 CAPSULE | Refills: 5 | Status: SHIPPED | OUTPATIENT
Start: 2024-02-23

## 2024-02-27 ENCOUNTER — RA CDI HCC (OUTPATIENT)
Dept: OTHER | Facility: HOSPITAL | Age: 44
End: 2024-02-27

## 2024-02-27 PROBLEM — K56.609 SMALL BOWEL OBSTRUCTION (HCC): Status: RESOLVED | Noted: 2021-04-16 | Resolved: 2024-02-27

## 2024-02-27 NOTE — PROGRESS NOTES
HCC coding opportunities          Chart Reviewed number of suggestions sent to Provider: 1   J45.20    This is a reminder to address (resolve/update/assess) ALL HCC (risk adjustment) codes as found on active problem list for 2024 as patient scores reset to zero KATHY.  Also, just a reminder to please review and assess all other chronic conditions for 2024  Patients Insurance        Commercial Insurance: [employer]

## 2024-03-05 ENCOUNTER — HOSPITAL ENCOUNTER (OUTPATIENT)
Dept: MAMMOGRAPHY | Facility: MEDICAL CENTER | Age: 44
Discharge: HOME | End: 2024-03-05
Payer: COMMERCIAL

## 2024-03-05 ENCOUNTER — OFFICE VISIT (OUTPATIENT)
Dept: FAMILY MEDICINE CLINIC | Facility: CLINIC | Age: 44
End: 2024-03-05
Payer: COMMERCIAL

## 2024-03-05 VITALS
WEIGHT: 293 LBS | TEMPERATURE: 97.3 F | OXYGEN SATURATION: 98 % | HEART RATE: 75 BPM | BODY MASS INDEX: 48.82 KG/M2 | DIASTOLIC BLOOD PRESSURE: 90 MMHG | SYSTOLIC BLOOD PRESSURE: 120 MMHG | HEIGHT: 65 IN

## 2024-03-05 VITALS — HEIGHT: 65 IN | BODY MASS INDEX: 48.32 KG/M2 | WEIGHT: 290 LBS

## 2024-03-05 DIAGNOSIS — M25.512 CHRONIC LEFT SHOULDER PAIN: ICD-10-CM

## 2024-03-05 DIAGNOSIS — Z11.51 ENCOUNTER FOR SCREENING FOR HUMAN PAPILLOMAVIRUS (HPV): ICD-10-CM

## 2024-03-05 DIAGNOSIS — Z13.1 SCREENING FOR DIABETES MELLITUS: ICD-10-CM

## 2024-03-05 DIAGNOSIS — Z13.6 SCREENING FOR CARDIOVASCULAR CONDITION: ICD-10-CM

## 2024-03-05 DIAGNOSIS — M54.2 NECK PAIN: ICD-10-CM

## 2024-03-05 DIAGNOSIS — G47.33 OBSTRUCTIVE SLEEP APNEA: ICD-10-CM

## 2024-03-05 DIAGNOSIS — Z98.84 H/O GASTRIC BYPASS: ICD-10-CM

## 2024-03-05 DIAGNOSIS — K42.9 UMBILICAL HERNIA WITHOUT OBSTRUCTION AND WITHOUT GANGRENE: Primary | ICD-10-CM

## 2024-03-05 DIAGNOSIS — Z12.12 SCREENING FOR COLORECTAL CANCER: ICD-10-CM

## 2024-03-05 DIAGNOSIS — M62.838 MUSCLE SPASM: ICD-10-CM

## 2024-03-05 DIAGNOSIS — G89.29 CHRONIC LEFT SHOULDER PAIN: ICD-10-CM

## 2024-03-05 DIAGNOSIS — F41.9 ANXIETY: ICD-10-CM

## 2024-03-05 DIAGNOSIS — Z12.11 SCREENING FOR COLORECTAL CANCER: ICD-10-CM

## 2024-03-05 PROCEDURE — 77067 SCR MAMMO BI INCL CAD: CPT

## 2024-03-05 PROCEDURE — 99214 OFFICE O/P EST MOD 30 MIN: CPT | Performed by: FAMILY MEDICINE

## 2024-03-05 PROCEDURE — 77063 BREAST TOMOSYNTHESIS BI: CPT

## 2024-03-05 RX ORDER — CYCLOBENZAPRINE HCL 5 MG
5 TABLET ORAL
Qty: 60 TABLET | Refills: 1 | Status: SHIPPED | OUTPATIENT
Start: 2024-03-05

## 2024-03-05 NOTE — ASSESSMENT & PLAN NOTE
Patient would like elective repair of small fat containing ventral hernia  Referral to general surgery

## 2024-03-05 NOTE — ASSESSMENT & PLAN NOTE
Patient is gaining weight  Encouraged dietary modifications and low calorie diet  Recommend patient consume 1500 calories daily  Incorporate exercise 30 minutes daily 3-5 times weekly

## 2024-03-05 NOTE — PROGRESS NOTES
Assessment/Plan:      1. Umbilical hernia without obstruction and without gangrene  Assessment & Plan:  Patient would like elective repair of small fat containing ventral hernia  Referral to general surgery    Orders:  -     Ambulatory Referral to General Surgery; Future    2. H/O gastric bypass  Assessment & Plan:  Patient is gaining weight  Encouraged dietary modifications and low calorie diet  Recommend patient consume 1500 calories daily  Incorporate exercise 30 minutes daily 3-5 times weekly    Orders:  -     CBC and differential; Future; Expected date: 09/05/2024  -     Vitamin D 25 hydroxy; Future; Expected date: 09/05/2024  -     Iron Panel (Includes Ferritin, Iron Sat%, Iron, and TIBC); Future; Expected date: 09/05/2024    3. Screening for colorectal cancer  -     Ambulatory referral to Colorectal Surgery; Future    4. Anxiety  Assessment & Plan:  Much improved since restarting prozac 10mg daily      5. Obstructive sleep apnea  -     Ambulatory Referral to Sleep Medicine; Future    6. Encounter for screening for human papillomavirus (HPV)  -     Ambulatory Referral to Obstetrics / Gynecology; Future    7. Neck pain  Assessment & Plan:  Improved  Continue flexeril as needed      Orders:  -     cyclobenzaprine (FLEXERIL) 5 mg tablet; Take 1 tablet (5 mg total) by mouth daily at bedtime as needed for muscle spasms    8. Muscle spasm  -     cyclobenzaprine (FLEXERIL) 5 mg tablet; Take 1 tablet (5 mg total) by mouth daily at bedtime as needed for muscle spasms    9. Chronic left shoulder pain  -     cyclobenzaprine (FLEXERIL) 5 mg tablet; Take 1 tablet (5 mg total) by mouth daily at bedtime as needed for muscle spasms    10. Screening for diabetes mellitus  -     Comprehensive metabolic panel; Future; Expected date: 09/05/2024  -     Hemoglobin A1C; Future; Expected date: 09/05/2024    11. Screening for cardiovascular condition  -     Lipid panel; Future; Expected date: 09/05/2024            Subjective:  "     Patient ID: Stormy Smith is a 43 y.o. female presents today for routine follow up. She is gaining weight    Would like referral to gyn, sleep medicine, and general surgery    HPI    The following portions of the patient's history were reviewed and updated as appropriate: allergies, current medications, past family history, past medical history, past social history, past surgical history, and problem list.    Review of Systems   Constitutional:  Negative for activity change, chills, diaphoresis and fever.   HENT:  Negative for ear pain, hearing loss, postnasal drip, rhinorrhea, sinus pressure, sinus pain, sneezing and sore throat.    Respiratory:  Negative for cough, chest tightness, shortness of breath and wheezing.    Cardiovascular:  Negative for chest pain, palpitations and leg swelling.   Gastrointestinal:  Negative for abdominal pain, blood in stool, constipation, diarrhea, nausea and vomiting.   Genitourinary:  Negative for dysuria, frequency, hematuria and urgency.   Musculoskeletal:  Negative for arthralgias and myalgias.   Neurological:  Negative for dizziness, syncope, weakness, light-headedness, numbness and headaches.         Objective:      /90 (BP Location: Left arm, Patient Position: Sitting, Cuff Size: Standard)   Pulse 75   Temp (!) 97.3 °F (36.3 °C) (Temporal)   Ht 5' 4.5\" (1.638 m)   Wt (!) 138 kg (304 lb 12.8 oz)   LMP 02/20/2024   SpO2 98%   BMI 51.51 kg/m²          Physical Exam  Vitals reviewed.   Constitutional:       General: She is not in acute distress.     Appearance: She is well-developed. She is not diaphoretic.   HENT:      Head: Normocephalic and atraumatic.      Right Ear: External ear normal.      Left Ear: External ear normal.      Nose: Nose normal. No congestion.      Mouth/Throat:      Mouth: Mucous membranes are moist.      Pharynx: Oropharynx is clear. No oropharyngeal exudate.   Eyes:      General: No scleral icterus.        Right eye: No discharge.         " Left eye: No discharge.      Conjunctiva/sclera: Conjunctivae normal.      Pupils: Pupils are equal, round, and reactive to light.   Neck:      Thyroid: No thyromegaly.      Vascular: No JVD.      Trachea: No tracheal deviation.   Cardiovascular:      Rate and Rhythm: Normal rate and regular rhythm.      Heart sounds: Normal heart sounds. No murmur heard.     No friction rub. No gallop.   Pulmonary:      Effort: Pulmonary effort is normal. No respiratory distress.      Breath sounds: Normal breath sounds. No wheezing or rales.   Chest:      Chest wall: No tenderness.   Abdominal:      General: Bowel sounds are normal. There is no distension.      Palpations: Abdomen is soft.      Tenderness: There is no abdominal tenderness. There is no right CVA tenderness, left CVA tenderness, guarding or rebound.   Musculoskeletal:         General: Normal range of motion.      Cervical back: Normal range of motion and neck supple. No tenderness.      Right lower leg: No edema.      Left lower leg: No edema.   Lymphadenopathy:      Cervical: No cervical adenopathy.   Skin:     General: Skin is warm and dry.   Neurological:      Mental Status: She is alert and oriented to person, place, and time. Mental status is at baseline.   Psychiatric:         Mood and Affect: Mood normal.         Behavior: Behavior normal.         Thought Content: Thought content normal.         Judgment: Judgment normal.

## 2024-08-27 ENCOUNTER — TELEPHONE (OUTPATIENT)
Age: 44
End: 2024-08-27

## 2024-09-10 ENCOUNTER — OFFICE VISIT (OUTPATIENT)
Dept: OBGYN CLINIC | Facility: CLINIC | Age: 44
End: 2024-09-10
Payer: COMMERCIAL

## 2024-09-10 VITALS
SYSTOLIC BLOOD PRESSURE: 106 MMHG | WEIGHT: 293 LBS | HEIGHT: 65 IN | DIASTOLIC BLOOD PRESSURE: 80 MMHG | BODY MASS INDEX: 48.82 KG/M2

## 2024-09-10 DIAGNOSIS — Z12.31 ENCOUNTER FOR SCREENING MAMMOGRAM FOR MALIGNANT NEOPLASM OF BREAST: ICD-10-CM

## 2024-09-10 DIAGNOSIS — Z11.51 ENCOUNTER FOR SCREENING FOR HUMAN PAPILLOMAVIRUS (HPV): ICD-10-CM

## 2024-09-10 DIAGNOSIS — Z01.419 PAP SMEAR, AS PART OF ROUTINE GYNECOLOGICAL EXAMINATION: ICD-10-CM

## 2024-09-10 DIAGNOSIS — Z01.419 ENCOUNTER FOR GYNECOLOGICAL EXAMINATION WITHOUT ABNORMAL FINDING: Primary | ICD-10-CM

## 2024-09-10 PROCEDURE — S0610 ANNUAL GYNECOLOGICAL EXAMINA: HCPCS | Performed by: OBSTETRICS & GYNECOLOGY

## 2024-09-10 NOTE — PATIENT INSTRUCTIONS
Topic: Pt presented to the office today to establish care and to discuss beginning an oral contraceptive pill for birth control.     Pt denies any tobacco use, history of blood clots, or blood pressure issues.    I am prescribing a low dose combined oral contraceptive pill to be taken every day at the same time. I instructed the pt to use back-up protection for the first month of the pill.     Pt was instructed to follow-up in the office in 3-4 months to assess how she is doing with the medication. I told the pt to call the office with any questions or concerns in the interim.

## 2024-09-10 NOTE — PROGRESS NOTES
Assessment/Plan:    No problem-specific Assessment & Plan notes found for this encounter.       Diagnoses and all orders for this visit:    Encounter for gynecological examination without abnormal finding    Pap smear, as part of routine gynecological examination  -     Thinprep Tis and HPV mRNA E6/E7    Encounter for screening for human papillomavirus (HPV)  -     Ambulatory Referral to Obstetrics / Gynecology    Encounter for screening mammogram for malignant neoplasm of breast          Normal gynecological physical examination.  Self-breast examination stressed.  Mammogram ordered.  Discussed regular exercise, healthy diet, importance of vitamin D and calcium supplements.  Discussed importance of sun block use during periods of prolonged sun exposure.  Patient will be seen in 1 year for routine gynecologic and medical examination.  Patient will call office for any problems, concerns, or issues which may arise during the interim.     Subjective:          HPI Pt is a 43 y.o. female who presents to the office to establish care and for an annual GYN exam. Pt states that she has regular, 28 day cycles with 5-7 days of active bleeding. Pt reports 1-2 days of heavy bleeding requiring 2-3 pads on those days. Pt states that she has been having right sided pelvic pain associated with heavier menstrual cycles that radiates down her leg without numbness, weakness, or tingling.     Pt is currently sexually active and denies any dyspareunia or post-coital bleeding. Pt also denies vaginal discharge, vulvar pruritus, dysuria, fevers, or chills.     Pt expressed a desire to begin on an oral contraceptive pill for birth control. Pt denies any tobacco use, a history of blood clots, or blood pressure issues.     I prescribed a low dose contraceptive pill for the patient to begin on the Sunday after she begins her next menstrual cycle. Pt was instructed to take the pill at the same time every day with food to avoid GI upset.        Patient ID: Stormy Smith is a 43 y.o. female who presents today for her annual gynecologic and medical examination    Menstrual status: Pt states that she has regular, 28 day cycles with 5-7 days of active bleeding. Pt reports 1-2 days of heavy bleeding requiring 2-3 pads on those days. Pt states that she has been having right sided pelvic pain associated with heavier menstrual cycles that radiates down her leg without numbness, weakness, or tingling.     Vasomotor symptoms: Pt reports having hot flashes for the last 2 years and has had night sweats 2-3 times. Pt also reports mood changes a few days prior to the start of her menstrual cycles. Pt denies any vaginal dryness or dyspareunia.     Patient reports normal appetite    Patient reports normal bowel and bladder habits    Patient denies any significant pelvic or abdominal pain    Patient denies any headaches, chest pain, shortness of breath fever shakes or chills    Patient denies any COVID 19 symptoms including cough or loss of taste or smell    COVID vaccine status: Pt is aware of COVID guidelines and recommendations.     Medical problems:Pt denies any new medical issues or concerns.     Colonoscopy status:Pt is up to date with screening guidelines.     Mammogram status:Pt is up to date with screening guidelines. An order for a mammogram will be placed.     The following portions of the patient's history were reviewed and updated as appropriate: allergies, current medications, past family history, past medical history, past social history, past surgical history and problem list.    Review of Systems   Constitutional: Negative.  Negative for appetite change, diaphoresis, fatigue, fever and unexpected weight change.   HENT: Negative.     Eyes: Negative.    Respiratory: Negative.  Negative for shortness of breath.    Cardiovascular: Negative.  Negative for chest pain and palpitations.   Gastrointestinal:  Positive for abdominal pain and constipation.  "Negative for blood in stool, diarrhea, nausea and vomiting.        Pt reports intermittent abdominal pain due to an incisional hernia that she is being followed for. Pt reports starting a fiber supplement for constipation.    Endocrine: Negative.  Negative for cold intolerance, heat intolerance and polydipsia.   Genitourinary: Negative.  Negative for dyspareunia, dysuria, frequency, hematuria, urgency, vaginal bleeding, vaginal discharge and vaginal pain.   Musculoskeletal: Negative.    Skin: Negative.    Allergic/Immunologic: Negative.    Neurological: Negative.  Negative for headaches.   Hematological: Negative.  Negative for adenopathy.   Psychiatric/Behavioral: Negative.           Objective:      /80 (BP Location: Left arm, Patient Position: Sitting, Cuff Size: Large)   Ht 5' 4.5\" (1.638 m)   Wt (!) 142 kg (312 lb)   LMP 09/04/2024 (Exact Date)   BMI 52.73 kg/m²          Physical Exam  Exam conducted with a chaperone present.   Constitutional:       General: She is not in acute distress.     Appearance: Normal appearance. She is well-developed. She is not diaphoretic.   HENT:      Head: Normocephalic and atraumatic.   Eyes:      Pupils: Pupils are equal, round, and reactive to light.   Cardiovascular:      Rate and Rhythm: Normal rate and regular rhythm.      Heart sounds: Normal heart sounds. No murmur heard.     No friction rub. No gallop.   Pulmonary:      Effort: Pulmonary effort is normal.      Breath sounds: Normal breath sounds.   Chest:   Breasts:     Toney Score is 5.      Breasts are symmetrical.      Right: No inverted nipple, mass, nipple discharge, skin change or tenderness.      Left: No inverted nipple, mass, nipple discharge, skin change or tenderness.      Comments: Fibrocystic breast changes noted bilaterally.   Abdominal:      General: Bowel sounds are normal.      Palpations: Abdomen is soft.   Genitourinary:     General: Normal vulva.      Exam position: Supine.      Labia:        "  Right: No rash or lesion.         Left: No rash or lesion.       Vagina: Normal. No vaginal discharge, erythema, tenderness or bleeding.      Cervix: No discharge or friability.      Uterus: Not enlarged and not tender.       Adnexa:         Right: No mass, tenderness or fullness.          Left: No mass, tenderness or fullness.        Rectum: Guaiac result negative.      Comments: Good pelvic support confirmed.   Musculoskeletal:         General: Normal range of motion.      Cervical back: Normal range of motion and neck supple.   Lymphadenopathy:      Cervical: No cervical adenopathy.      Upper Body:      Right upper body: No supraclavicular adenopathy.      Left upper body: No supraclavicular adenopathy.   Skin:     General: Skin is warm and dry.      Findings: No rash.   Neurological:      General: No focal deficit present.      Mental Status: She is alert and oriented to person, place, and time.   Psychiatric:         Mood and Affect: Mood normal.         Speech: Speech normal.         Behavior: Behavior normal.         Thought Content: Thought content normal.         Judgment: Judgment normal.

## 2024-09-13 LAB
CLINICAL INFO: NORMAL
CYTO CVX: NORMAL
CYTOLOGY CMNT CVX/VAG CYTO-IMP: NORMAL
DATE PREVIOUS BX: NORMAL
HPV E6+E7 MRNA CVX QL NAA+PROBE: NOT DETECTED
LMP START DATE: NORMAL
SL AMB PREV. PAP:: NORMAL
SPECIMEN SOURCE CVX/VAG CYTO: NORMAL

## 2024-09-16 ENCOUNTER — PATIENT MESSAGE (OUTPATIENT)
Dept: OBGYN CLINIC | Facility: CLINIC | Age: 44
End: 2024-09-16

## 2024-09-16 DIAGNOSIS — Z30.011 OCP (ORAL CONTRACEPTIVE PILLS) INITIATION: Primary | ICD-10-CM

## 2024-09-17 ENCOUNTER — OFFICE VISIT (OUTPATIENT)
Dept: SURGERY | Facility: CLINIC | Age: 44
End: 2024-09-17
Payer: COMMERCIAL

## 2024-09-17 VITALS
BODY MASS INDEX: 48.82 KG/M2 | SYSTOLIC BLOOD PRESSURE: 139 MMHG | HEIGHT: 65 IN | RESPIRATION RATE: 18 BRPM | WEIGHT: 293 LBS | OXYGEN SATURATION: 96 % | DIASTOLIC BLOOD PRESSURE: 82 MMHG | HEART RATE: 76 BPM

## 2024-09-17 DIAGNOSIS — K42.9 UMBILICAL HERNIA WITHOUT OBSTRUCTION AND WITHOUT GANGRENE: ICD-10-CM

## 2024-09-17 DIAGNOSIS — K43.2 INCISIONAL HERNIA, WITHOUT OBSTRUCTION OR GANGRENE: Primary | ICD-10-CM

## 2024-09-17 PROCEDURE — 99244 OFF/OP CNSLTJ NEW/EST MOD 40: CPT | Performed by: SURGERY

## 2024-09-17 RX ORDER — HEPARIN SODIUM 5000 [USP'U]/ML
7500 INJECTION, SOLUTION INTRAVENOUS; SUBCUTANEOUS ONCE
OUTPATIENT
Start: 2024-09-17 | End: 2024-09-17

## 2024-09-17 NOTE — PROGRESS NOTES
Ambulatory Visit  Name: Stormy Smith      : 1980      MRN: [id number]  Encounter Provider: Henry Lovelace MD  Encounter Date: 2024   Encounter department: [facility] GENERAL SURGERY [place]    Assessment & Plan  Incisional hernia, without obstruction or gangrene  Talked her little bit about what a hernia is.  We did talk about repairing this.  I talked to her about the open and laparoscopic repair.  I told her laparoscopic approach would be reasonable.  I discussed the procedure in detail including risks, benefits, alternatives, and what to expect postoperatively.  She understands and is agreeable to proceed.    Plan: Laparoscopic incisional hernia repair           History of Present Illness     Stormy Smith is a 43 y.o. female who presents for hernia evaluation.  She had a small bowel repair done for perforation several years ago.  This was after gastric bypass surgery.  She noted about a year after that that she started having bulging to the right of the midline at about the level of her umbilicus.  Is gotten somewhat worse and bothers her now and again.  She recently had an ultrasound which documents a hernia that was about 1.6 cm.  She is here to discuss repair.      Review of Systems   Constitutional:  Negative for chills and fever.   HENT:  Negative for trouble swallowing and voice change.    Eyes:  Negative for pain and visual disturbance.   Respiratory:  Negative for cough and shortness of breath.    Cardiovascular:  Negative for chest pain and leg swelling.   Gastrointestinal:  Negative for abdominal pain, nausea and vomiting.   Endocrine: Negative for cold intolerance, heat intolerance, polydipsia, polyphagia and polyuria.   Genitourinary:  Negative for difficulty urinating and flank pain.   Musculoskeletal:  Negative for arthralgias and gait problem.   Skin:  Negative for rash and wound.   Allergic/Immunologic: Negative for food allergies.   Neurological:  Negative for seizures, syncope,  weakness and headaches.   Hematological:  Negative for adenopathy.   Psychiatric/Behavioral:  Negative for confusion.    All other systems reviewed and are negative.    Past Medical History   Past Medical History:   Diagnosis Date    Abdominal pain 12/26/2011    ALSO 1/25/2013    Allergic     Yes    Anxiety 6/2023    Arthritis     Knees    Back pain 08/2007    Chest pain 12/2011 12/2011 ER visit. 12/27/01 Hospitalization    Foot pain 11/2007    PLANTAR FASCITIS    Gastro-esophageal reflux 2012    GERD (gastroesophageal reflux disease)     Heel pain 04/23/2008    LT    Miscarriage 2008    Obesity     MORBID WITH BMI OF 58    Rectal bleeding     Diarrhea    Small bowel obstruction (HCC)      Past Surgical History:   Procedure Laterality Date    CHOLECYSTECTOMY      COLONOSCOPY  02/2012    ESOPHAGOGASTRODUODENOSCOPY  02/2012    GASTRIC BYPASS  05/23/2012    Morbid Obesity    HERNIA REPAIR      OTHER SURGICAL HISTORY  11/2007    PLANTAR FASCITIS    SMALL INTESTINE SURGERY      Scar tissue removed from small intestine upper left     Family History   Problem Relation Age of Onset    Hypertension Mother     Arthritis Mother     Diabetes Father         Mellitus    Hypertension Father     Coronary artery disease Father 40        Premature    COPD Father     Heart attack Father     Heart disease Father     Heart failure Father     No Known Problems Sister     No Known Problems Maternal Grandmother     Prostate cancer Maternal Grandfather 70    No Known Problems Paternal Grandmother     No Known Problems Paternal Grandfather     Colon cancer Maternal Aunt 62    Breast cancer Maternal Aunt 62    No Known Problems Maternal Aunt     No Known Problems Maternal Aunt     No Known Problems Maternal Aunt     No Known Problems Maternal Aunt     No Known Problems Maternal Aunt     No Known Problems Maternal Aunt     No Known Problems Maternal Aunt     Breast cancer Paternal Aunt 62    No Known Problems Paternal Aunt     No Known  "Problems Paternal Aunt     Breast cancer Cousin 35    Diabetes Family         Melitus    Diabetes Family         Mellitus    Colon cancer Maternal Uncle 58     Current Outpatient Medications on File Prior to Visit   Medication Sig Dispense Refill    albuterol (ProAir HFA) 90 mcg/act inhaler Inhale 2 puffs every 6 (six) hours as needed for wheezing 8.5 g 3    cyclobenzaprine (FLEXERIL) 5 mg tablet Take 1 tablet (5 mg total) by mouth daily at bedtime as needed for muscle spasms 60 tablet 1    Diclofenac Sodium (VOLTAREN) 1 % Apply 2 g topically 4 (four) times a day 1 Tube 0    FLUoxetine (PROzac) 10 mg capsule TAKE 1 CAPSULE BY MOUTH EVERY DAY 30 capsule 5    fluticasone (FLONASE) 50 mcg/act nasal spray 2 sprays into each nostril as needed       loratadine (CLARITIN) 10 mg tablet Take by mouth      Multiple Vitamins-Minerals (MULTIVITAMIN ADULT) TABS Take 1 tablet by mouth daily       No current facility-administered medications on file prior to visit.     Allergies   Allergen Reactions    Amoxicillin Rash          Objective     /82 (BP Location: Left arm, Patient Position: Sitting, Cuff Size: Large)   Pulse 76   Resp 18   Ht 5' 4.5\" (1.638 m)   Wt (!) 142 kg (313 lb 12.8 oz)   LMP 09/04/2024 (Exact Date)   SpO2 96%   BMI 53.03 kg/m²     Physical Exam  Vitals and nursing note reviewed.   Constitutional:       General: She is not in acute distress.     Appearance: She is well-developed. She is obese. She is not diaphoretic.   HENT:      Head: Normocephalic and atraumatic.      Right Ear: External ear normal.      Left Ear: External ear normal.   Eyes:      General: No scleral icterus.     Conjunctiva/sclera: Conjunctivae normal.   Neck:      Thyroid: No thyromegaly.      Trachea: No tracheal deviation.   Cardiovascular:      Rate and Rhythm: Normal rate and regular rhythm.      Heart sounds: Normal heart sounds. No murmur heard.     No friction rub.   Pulmonary:      Effort: Pulmonary effort is normal. No " respiratory distress.      Breath sounds: Normal breath sounds. No wheezing or rales.   Abdominal:      General: There is no distension.      Palpations: Abdomen is soft. There is no mass.      Tenderness: There is no abdominal tenderness. There is no guarding or rebound.      Comments: Midline abdomen is a well-healed incision.  Difficult to assess the hernia defect size but there does feel to be a bulging just to the right of the midline incision.   Musculoskeletal:         General: Normal range of motion.      Cervical back: Neck supple.   Lymphadenopathy:      Cervical: No cervical adenopathy.   Skin:     General: Skin is warm and dry.   Neurological:      Mental Status: She is alert and oriented to person, place, and time.   Psychiatric:         Behavior: Behavior normal.         Thought Content: Thought content normal.         Judgment: Judgment normal.

## 2024-09-17 NOTE — ASSESSMENT & PLAN NOTE
Talked her little bit about what a hernia is.  We did talk about repairing this.  I talked to her about the open and laparoscopic repair.  I told her laparoscopic approach would be reasonable.  I discussed the procedure in detail including risks, benefits, alternatives, and what to expect postoperatively.  She understands and is agreeable to proceed.    Plan: Laparoscopic incisional hernia repair

## 2024-09-23 RX ORDER — NORETHINDRONE ACETATE AND ETHINYL ESTRADIOL 1MG-20(21)
1 KIT ORAL DAILY
Qty: 28 TABLET | Refills: 5 | Status: SHIPPED | OUTPATIENT
Start: 2024-09-23

## 2024-09-26 NOTE — PROGRESS NOTES
Colon and Rectal Surgery   Stormy Smith 43 y.o. female MRN [id number]  Encounter: [id number]  09/30/24 1:23 PM            Assessment: Stormy Smith is a 43 y.o. female who has constipation      Plan:   Screening for malignant neoplasm of colon  Colonoscopy risks, not limited to bleeding, perforated colon, need for surgery, and missed lesions were discussed. Alternatives were discussed. Questions were answered. She agreed to the procedure.     Family history of colon cancer requiring screening colonoscopy  Colonoscopy risks, not limited to bleeding, perforated colon, need for surgery, and missed lesions were discussed. Alternatives were discussed. Questions were answered. She agreed to the procedure.     Has had some intermittent constipation with left lower quadrant discomfort.  In the intervals, she is well.  No other workup is recommended for this at this time.    Other constipation  She has intermittent constipation associated with left lower quadrant discomfort.  Currently, she is feeling well.  Colonoscopy is indicated for screening.  No other workup is recommended at this time.    She knows to contact us or her primary care doctor for any change in her symptoms, specifically worsening pain or persistence of pain.  A CT scan could be considered at that time.      Subjective     HPI    Stormy Smith is a 43 y.o. female who presents for consultation prior to colonoscopy scheduling.     The patient notes left lower quadrant abdominal pain, off and on, as well as constipation for the past 6 months.     The patient has a surgical history of diagnostic laparoscopy converted to laparotomy, KENZIE, repair of small bowel enterotomy (N/A abdomen) by Dr. Pete Gomes, at [facility], for small bowel obstruction.     Laparoscopic incisional hernia repair scheduled for 12/19/2024.     History of gastric bypass.        Historical Information   Past Medical History:   Diagnosis Date    Abdominal pain 12/26/2011    ALSO 1/25/2013    Allergic      Yes    Anxiety 6/2023    Arthritis     Knees    Back pain 08/2007    Chest pain 12/2011 12/2011 ER visit. 12/27/01 Hospitalization    Foot pain 11/2007    PLANTAR FASCITIS    Gastro-esophageal reflux 2012    GERD (gastroesophageal reflux disease)     Heel pain 04/23/2008    LT    Miscarriage 2008    Obesity     MORBID WITH BMI OF 58    Rectal bleeding     Diarrhea    Small bowel obstruction (HCC)      Past Surgical History:   Procedure Laterality Date    CHOLECYSTECTOMY      COLONOSCOPY  02/2012    ESOPHAGOGASTRODUODENOSCOPY  02/2012    GASTRIC BYPASS  05/23/2012    Morbid Obesity    HERNIA REPAIR      OTHER SURGICAL HISTORY  11/2007    PLANTAR FASCITIS    SMALL INTESTINE SURGERY      Scar tissue removed from small intestine upper left       Meds/Allergies       Current Outpatient Medications:     albuterol (ProAir HFA) 90 mcg/act inhaler, Inhale 2 puffs every 6 (six) hours as needed for wheezing, Disp: 8.5 g, Rfl: 3    cyclobenzaprine (FLEXERIL) 5 mg tablet, Take 1 tablet (5 mg total) by mouth daily at bedtime as needed for muscle spasms, Disp: 60 tablet, Rfl: 1    Diclofenac Sodium (VOLTAREN) 1 %, Apply 2 g topically 4 (four) times a day, Disp: 1 Tube, Rfl: 0    fluticasone (FLONASE) 50 mcg/act nasal spray, 2 sprays into each nostril as needed , Disp: , Rfl:     loratadine (CLARITIN) 10 mg tablet, Take by mouth, Disp: , Rfl:     Multiple Vitamins-Minerals (MULTIVITAMIN ADULT) TABS, Take 1 tablet by mouth daily, Disp: , Rfl:     FLUoxetine (PROzac) 10 mg capsule, TAKE 1 CAPSULE BY MOUTH EVERY DAY (Patient not taking: Reported on 9/30/2024), Disp: 30 capsule, Rfl: 5    norethindrone-ethinyl estradiol (Junel FE 1/20) 1-20 MG-MCG per tablet, Take 1 tablet by mouth daily, Disp: 28 tablet, Rfl: 5  Allergies   Allergen Reactions    Amoxicillin Rash       Social History   Social History     Substance and Sexual Activity   Drug Use No     Social History     Tobacco Use   Smoking Status Former    Current packs/day:  "0.00    Types: Cigarettes    Quit date: 2004    Years since quittin.2   Smokeless Tobacco Never   Tobacco Comments    USED CHANTIX TO QUIT SMOKING . Per [employer] 10/4/16 Never a smoker         Family History   Problem Relation Age of Onset    Hypertension Mother     Arthritis Mother     Diabetes Father         Mellitus    Hypertension Father     Coronary artery disease Father 40        Premature    COPD Father     Heart attack Father     Heart disease Father     Heart failure Father     No Known Problems Sister     Colon cancer Maternal Aunt 62    Breast cancer Maternal Aunt 62    No Known Problems Maternal Aunt     No Known Problems Maternal Aunt     No Known Problems Maternal Aunt     No Known Problems Maternal Aunt     No Known Problems Maternal Aunt     No Known Problems Maternal Aunt     No Known Problems Maternal Aunt     Colon cancer Maternal Uncle 58    Breast cancer Paternal Aunt 62    No Known Problems Paternal Aunt     No Known Problems Paternal Aunt     No Known Problems Maternal Grandmother     Prostate cancer Maternal Grandfather 70    No Known Problems Paternal Grandmother     No Known Problems Paternal Grandfather     Colon cancer Cousin     Breast cancer Cousin 35    Diabetes Family         Melitus    Diabetes Family         Mellitus         Review of Systems   Constitutional: Negative.    Respiratory: Negative.     Cardiovascular: Negative.    Gastrointestinal:  Positive for constipation.       Objective   Current Vitals:  Vitals:    24 1255   Weight: (!) 143 kg (315 lb)   Height: 5' 4.5\" (1.638 m)         Physical Exam  Constitutional:       Appearance: Normal appearance.   Cardiovascular:      Rate and Rhythm: Normal rate and regular rhythm.   Pulmonary:      Effort: Pulmonary effort is normal.      Breath sounds: Normal breath sounds.   Abdominal:      General: Abdomen is flat.      Palpations: Abdomen is soft. There is no mass.      Tenderness: There is no abdominal " tenderness. There is no guarding.      Comments: Large abdomen limits the exam.   Neurological:      Mental Status: She is alert.

## 2024-09-30 ENCOUNTER — OFFICE VISIT (OUTPATIENT)
Age: 44
End: 2024-09-30
Payer: COMMERCIAL

## 2024-09-30 ENCOUNTER — TELEPHONE (OUTPATIENT)
Age: 44
End: 2024-09-30

## 2024-09-30 VITALS — WEIGHT: 293 LBS | HEIGHT: 65 IN | BODY MASS INDEX: 48.82 KG/M2

## 2024-09-30 DIAGNOSIS — K59.09 OTHER CONSTIPATION: ICD-10-CM

## 2024-09-30 DIAGNOSIS — Z80.0 FAMILY HISTORY OF COLON CANCER REQUIRING SCREENING COLONOSCOPY: ICD-10-CM

## 2024-09-30 DIAGNOSIS — Z12.11 SCREENING FOR MALIGNANT NEOPLASM OF COLON: Primary | ICD-10-CM

## 2024-09-30 PROCEDURE — 99243 OFF/OP CNSLTJ NEW/EST LOW 30: CPT | Performed by: COLON & RECTAL SURGERY

## 2024-09-30 NOTE — ASSESSMENT & PLAN NOTE
Colonoscopy risks, not limited to bleeding, perforated colon, need for surgery, and missed lesions were discussed. Alternatives were discussed. Questions were answered. She agreed to the procedure.     Has had some intermittent constipation with left lower quadrant discomfort.  In the intervals, she is well.  No other workup is recommended for this at this time.

## 2024-09-30 NOTE — ASSESSMENT & PLAN NOTE
She has intermittent constipation associated with left lower quadrant discomfort.  Currently, she is feeling well.  Colonoscopy is indicated for screening.  No other workup is recommended at this time.    She knows to contact us or her primary care doctor for any change in her symptoms, specifically worsening pain or persistence of pain.  A CT scan could be considered at that time.

## 2024-09-30 NOTE — ASSESSMENT & PLAN NOTE
Colonoscopy risks, not limited to bleeding, perforated colon, need for surgery, and missed lesions were discussed. Alternatives were discussed. Questions were answered. She agreed to the procedure.

## 2024-10-15 ENCOUNTER — OFFICE VISIT (OUTPATIENT)
Dept: FAMILY MEDICINE CLINIC | Facility: CLINIC | Age: 44
End: 2024-10-15

## 2024-10-15 VITALS
HEIGHT: 65 IN | SYSTOLIC BLOOD PRESSURE: 132 MMHG | DIASTOLIC BLOOD PRESSURE: 90 MMHG | TEMPERATURE: 97.9 F | OXYGEN SATURATION: 100 % | HEART RATE: 92 BPM | BODY MASS INDEX: 48.82 KG/M2 | WEIGHT: 293 LBS

## 2024-10-15 DIAGNOSIS — Z98.84 HISTORY OF ROUX-EN-Y GASTRIC BYPASS: ICD-10-CM

## 2024-10-15 DIAGNOSIS — K43.2 INCISIONAL HERNIA, WITHOUT OBSTRUCTION OR GANGRENE: ICD-10-CM

## 2024-10-15 DIAGNOSIS — J30.9 ALLERGIC RHINITIS, UNSPECIFIED SEASONALITY, UNSPECIFIED TRIGGER: ICD-10-CM

## 2024-10-15 DIAGNOSIS — Z00.00 ANNUAL PHYSICAL EXAM: Primary | ICD-10-CM

## 2024-10-15 DIAGNOSIS — Z98.84 H/O GASTRIC BYPASS: ICD-10-CM

## 2024-10-15 DIAGNOSIS — E66.01 CLASS 3 SEVERE OBESITY DUE TO EXCESS CALORIES WITHOUT SERIOUS COMORBIDITY WITH BODY MASS INDEX (BMI) OF 50.0 TO 59.9 IN ADULT (HCC): ICD-10-CM

## 2024-10-15 DIAGNOSIS — R30.0 DYSURIA: ICD-10-CM

## 2024-10-15 DIAGNOSIS — J45.20 MILD INTERMITTENT ASTHMA WITHOUT COMPLICATION: ICD-10-CM

## 2024-10-15 DIAGNOSIS — E66.813 CLASS 3 SEVERE OBESITY DUE TO EXCESS CALORIES WITHOUT SERIOUS COMORBIDITY WITH BODY MASS INDEX (BMI) OF 50.0 TO 59.9 IN ADULT (HCC): ICD-10-CM

## 2024-10-15 DIAGNOSIS — K21.9 GASTROESOPHAGEAL REFLUX DISEASE, UNSPECIFIED WHETHER ESOPHAGITIS PRESENT: ICD-10-CM

## 2024-10-15 PROBLEM — T81.43XA ABSCESS, INTRA-ABDOMINAL, POSTOPERATIVE (HCC): Status: RESOLVED | Noted: 2021-05-01 | Resolved: 2024-10-15

## 2024-10-15 PROBLEM — K65.1 ABSCESS, INTRA-ABDOMINAL, POSTOPERATIVE (HCC): Status: RESOLVED | Noted: 2021-05-01 | Resolved: 2024-10-15

## 2024-10-15 LAB
BACTERIA UR QL AUTO: ABNORMAL /HPF
BILIRUB UR QL STRIP: NEGATIVE
CLARITY UR: ABNORMAL
COLOR UR: YELLOW
GLUCOSE UR STRIP-MCNC: NEGATIVE MG/DL
HGB UR QL STRIP.AUTO: NEGATIVE
KETONES UR STRIP-MCNC: NEGATIVE MG/DL
LEUKOCYTE ESTERASE UR QL STRIP: ABNORMAL
NITRITE UR QL STRIP: NEGATIVE
NON-SQ EPI CELLS URNS QL MICRO: ABNORMAL /HPF
PH UR STRIP.AUTO: 5.5 [PH]
PROT UR STRIP-MCNC: ABNORMAL MG/DL
RBC #/AREA URNS AUTO: ABNORMAL /HPF
SL AMB  POCT GLUCOSE, UA: ABNORMAL
SL AMB LEUKOCYTE ESTERASE,UA: ABNORMAL
SL AMB POCT BILIRUBIN,UA: ABNORMAL
SL AMB POCT BLOOD,UA: ABNORMAL
SL AMB POCT CLARITY,UA: ABNORMAL
SL AMB POCT COLOR,UA: YELLOW
SL AMB POCT KETONES,UA: ABNORMAL
SL AMB POCT NITRITE,UA: ABNORMAL
SL AMB POCT PH,UA: 5
SL AMB POCT SPECIFIC GRAVITY,UA: 1.01
SL AMB POCT URINE PROTEIN: 30
SL AMB POCT UROBILINOGEN: 0.2
SP GR UR STRIP.AUTO: 1.02 (ref 1–1.03)
UROBILINOGEN UR STRIP-ACNC: 2 MG/DL
WBC #/AREA URNS AUTO: ABNORMAL /HPF

## 2024-10-15 PROCEDURE — 81001 URINALYSIS AUTO W/SCOPE: CPT | Performed by: FAMILY MEDICINE

## 2024-10-15 PROCEDURE — 87086 URINE CULTURE/COLONY COUNT: CPT | Performed by: FAMILY MEDICINE

## 2024-10-15 PROCEDURE — 87186 SC STD MICRODIL/AGAR DIL: CPT | Performed by: FAMILY MEDICINE

## 2024-10-15 PROCEDURE — 87077 CULTURE AEROBIC IDENTIFY: CPT | Performed by: FAMILY MEDICINE

## 2024-10-15 RX ORDER — ALBUTEROL SULFATE 90 UG/1
2 INHALANT RESPIRATORY (INHALATION) EVERY 6 HOURS PRN
Qty: 8.5 G | Refills: 3 | Status: SHIPPED | OUTPATIENT
Start: 2024-10-15

## 2024-10-15 RX ORDER — NITROFURANTOIN 25; 75 MG/1; MG/1
100 CAPSULE ORAL 2 TIMES DAILY
Qty: 10 CAPSULE | Refills: 0 | Status: SHIPPED | OUTPATIENT
Start: 2024-10-15 | End: 2024-10-20

## 2024-10-15 NOTE — PROGRESS NOTES
Adult Annual Physical  Name: Stormy Smith      : 1980      MRN: [id number]  Encounter Provider: Shahram Rice DO  Encounter Date: 10/15/2024   Encounter department: [facility]    Assessment & Plan  Annual physical exam         Dysuria  Urinary frequency, and urgency.   Started 7 days  Urine dips showed luekocytes and protein  Follow up ua and urine culture  Start macrobid  Orders:    POCT urine dip    nitrofurantoin (MACROBID) 100 mg capsule; Take 1 capsule (100 mg total) by mouth 2 (two) times a day for 5 days    UA w Reflex to Microscopic w Reflex to Culture    Mild intermittent asthma without complication  Worsening  In the setting of recent URI  Refill on albuterol provided. She reports significant improvement in symptoms that last through the day  Follow up PFT  Trigger: URI, seasonal allergies, and cold air  Consider seasonal advair  Orders:    Methacholine challenge; Future    Incisional hernia, without obstruction or gangrene  Plan for surgery in 2024       Gastroesophageal reflux disease, unspecified whether esophagitis present         History of Aurora-en-Y gastric bypass  Patient is gaining weight  Eating poor  Recommend nutritionist  Patient not interested in revision or GLP1 agonist  Orders:    Ambulatory Referral to Weight Management; Future    H/O gastric bypass    Orders:    Ambulatory Referral to Weight Management; Future    Class 3 severe obesity due to excess calories without serious comorbidity with body mass index (BMI) of 50.0 to 59.9 in adult (HCC)    Orders:    Ambulatory Referral to Weight Management; Future    Allergic rhinitis, unspecified seasonality, unspecified trigger    Orders:    albuterol (ProAir HFA) 90 mcg/act inhaler; Inhale 2 puffs every 6 (six) hours as needed for wheezing    Immunizations and preventive care screenings were discussed with patient today. Appropriate education was printed on patient's after visit  summary.    Counseling:  Alcohol/drug use: discussed moderation in alcohol intake, the recommendations for healthy alcohol use, and avoidance of illicit drug use.  Dental Health: discussed importance of regular tooth brushing, flossing, and dental visits.  Injury prevention: discussed safety/seat belts, safety helmets, smoke detectors, carbon monoxide detectors, and smoking near bedding or upholstery.  Sexual health: discussed sexually transmitted diseases, partner selection, use of condoms, avoidance of unintended pregnancy, and contraceptive alternatives.  Exercise: the importance of regular exercise/physical activity was discussed. Recommend exercise 3-5 times per week for at least 30 minutes.          History of Present Illness     Adult Annual Physical:  Patient presents for annual physical.     Diet and Physical Activity:  - Diet/Nutrition: poor diet and frequent junk food.  - Exercise: no formal exercise.    Depression Screening:  - PHQ-2 Score: 0    Review of Systems   Constitutional:  Negative for activity change, chills, diaphoresis and fever.   HENT:  Negative for ear pain, hearing loss, postnasal drip, rhinorrhea, sinus pressure, sinus pain, sneezing and sore throat.    Respiratory:  Negative for cough, chest tightness, shortness of breath and wheezing.    Cardiovascular:  Negative for chest pain, palpitations and leg swelling.   Gastrointestinal:  Negative for abdominal pain, blood in stool, constipation, diarrhea, nausea and vomiting.   Genitourinary:  Positive for dysuria, frequency and urgency. Negative for flank pain and hematuria.   Musculoskeletal:  Negative for arthralgias and myalgias.   Neurological:  Negative for dizziness, syncope, weakness, light-headedness, numbness and headaches.     Current Outpatient Medications on File Prior to Visit   Medication Sig Dispense Refill    albuterol (ProAir HFA) 90 mcg/act inhaler Inhale 2 puffs every 6 (six) hours as needed for wheezing 8.5 g 3     "cyclobenzaprine (FLEXERIL) 5 mg tablet Take 1 tablet (5 mg total) by mouth daily at bedtime as needed for muscle spasms 60 tablet 1    Diclofenac Sodium (VOLTAREN) 1 % Apply 2 g topically 4 (four) times a day 1 Tube 0    fluticasone (FLONASE) 50 mcg/act nasal spray 2 sprays into each nostril as needed       loratadine (CLARITIN) 10 mg tablet Take by mouth      Multiple Vitamins-Minerals (MULTIVITAMIN ADULT) TABS Take 1 tablet by mouth daily      norethindrone-ethinyl estradiol (Junel ) 1-20 MG-MCG per tablet Take 1 tablet by mouth daily 28 tablet 5    FLUoxetine (PROzac) 10 mg capsule TAKE 1 CAPSULE BY MOUTH EVERY DAY (Patient not taking: Reported on 2024) 30 capsule 5     No current facility-administered medications on file prior to visit.      Social History     Tobacco Use    Smoking status: Former     Current packs/day: 0.00     Types: Cigarettes     Quit date: 2004     Years since quittin.3     Passive exposure: Past    Smokeless tobacco: Never    Tobacco comments:     USED CHANTIX TO QUIT SMOKING . Per [employer] 10/4/16 Never a smoker   Vaping Use    Vaping status: Never Used   Substance and Sexual Activity    Alcohol use: Yes     Alcohol/week: 1.0 standard drink of alcohol     Types: 1 Glasses of wine per week     Comment: social    Drug use: No    Sexual activity: Yes     Partners: Male     Birth control/protection: None       Objective     /90 (BP Location: Left arm, Patient Position: Sitting, Cuff Size: Adult)   Pulse 92   Temp 97.9 °F (36.6 °C) (Temporal)   Ht 5' 4.5\" (1.638 m)   Wt (!) 145 kg (319 lb 12.8 oz)   SpO2 100%   BMI 54.05 kg/m²     Physical Exam  Constitutional:       General: She is not in acute distress.     Appearance: Normal appearance. She is well-developed. She is not diaphoretic.   HENT:      Head: Normocephalic and atraumatic.      Right Ear: Tympanic membrane, ear canal and external ear normal. There is no impacted cerumen.      Left Ear: Tympanic " membrane, ear canal and external ear normal. There is no impacted cerumen.      Nose: Nose normal. No congestion or rhinorrhea.      Mouth/Throat:      Mouth: Mucous membranes are moist.      Pharynx: Oropharynx is clear. No oropharyngeal exudate.   Eyes:      Conjunctiva/sclera: Conjunctivae normal.      Pupils: Pupils are equal, round, and reactive to light.   Neck:      Vascular: No JVD.   Cardiovascular:      Rate and Rhythm: Normal rate and regular rhythm.      Heart sounds: Normal heart sounds. No murmur heard.     No friction rub. No gallop.   Pulmonary:      Effort: Pulmonary effort is normal. No respiratory distress.      Breath sounds: Normal breath sounds. No wheezing or rales.   Chest:      Chest wall: No tenderness.   Abdominal:      General: Bowel sounds are normal. There is no distension.      Palpations: Abdomen is soft.      Tenderness: There is no abdominal tenderness. There is no right CVA tenderness, left CVA tenderness, guarding or rebound.   Musculoskeletal:         General: No tenderness. Normal range of motion.      Cervical back: No tenderness.   Lymphadenopathy:      Cervical: No cervical adenopathy.   Skin:     General: Skin is warm and dry.   Neurological:      Mental Status: She is alert and oriented to person, place, and time.      Cranial Nerves: No cranial nerve deficit.   Psychiatric:         Mood and Affect: Mood and affect normal.         Behavior: Behavior normal.         Answers submitted by the patient for this visit:  Painful Urination Questionnaire (Submitted on 10/15/2024)  Chief Complaint: Dysuria  Chronicity: new  Onset: 1 to 4 weeks ago  Frequency: every urination  Progression since onset: gradually improving  Pain quality: burning  Pain severity: mild  Pain - numeric: 4/10  Fever: no fever  Sexually active?: No  History of pyelonephritis?: Yes  hesitancy: No  discharge: No  possible pregnancy: No  sweats: No

## 2024-10-15 NOTE — ASSESSMENT & PLAN NOTE
Worsening  In the setting of recent URI  Refill on albuterol provided. She reports significant improvement in symptoms that last through the day  Follow up PFT  Trigger: URI, seasonal allergies, and cold air  Consider seasonal advair  Orders:    Methacholine challenge; Future

## 2024-10-18 DIAGNOSIS — R30.0 DYSURIA: Primary | ICD-10-CM

## 2024-10-18 LAB — BACTERIA UR CULT: ABNORMAL

## 2024-10-18 RX ORDER — SULFAMETHOXAZOLE AND TRIMETHOPRIM 800; 160 MG/1; MG/1
1 TABLET ORAL EVERY 12 HOURS SCHEDULED
Qty: 14 TABLET | Refills: 0 | Status: SHIPPED | OUTPATIENT
Start: 2024-10-18 | End: 2024-10-25

## 2024-10-19 ENCOUNTER — APPOINTMENT (OUTPATIENT)
Dept: LAB | Facility: CLINIC | Age: 44
End: 2024-10-19
Payer: COMMERCIAL

## 2024-10-19 DIAGNOSIS — Z98.84 H/O GASTRIC BYPASS: ICD-10-CM

## 2024-10-19 DIAGNOSIS — Z13.1 SCREENING FOR DIABETES MELLITUS: ICD-10-CM

## 2024-10-19 DIAGNOSIS — Z13.6 SCREENING FOR CARDIOVASCULAR CONDITION: ICD-10-CM

## 2024-10-19 LAB
25(OH)D3 SERPL-MCNC: 22.5 NG/ML (ref 30–100)
ALBUMIN SERPL BCG-MCNC: 3.5 G/DL (ref 3.5–5)
ALP SERPL-CCNC: 75 U/L (ref 34–104)
ALT SERPL W P-5'-P-CCNC: 13 U/L (ref 7–52)
ANION GAP SERPL CALCULATED.3IONS-SCNC: 7 MMOL/L (ref 4–13)
AST SERPL W P-5'-P-CCNC: 15 U/L (ref 13–39)
BASOPHILS # BLD AUTO: 0.07 THOUSANDS/ΜL (ref 0–0.1)
BASOPHILS NFR BLD AUTO: 1 % (ref 0–1)
BILIRUB SERPL-MCNC: 0.37 MG/DL (ref 0.2–1)
BUN SERPL-MCNC: 12 MG/DL (ref 5–25)
CALCIUM SERPL-MCNC: 8.7 MG/DL (ref 8.4–10.2)
CHLORIDE SERPL-SCNC: 106 MMOL/L (ref 96–108)
CHOLEST SERPL-MCNC: 164 MG/DL
CO2 SERPL-SCNC: 27 MMOL/L (ref 21–32)
CREAT SERPL-MCNC: 0.67 MG/DL (ref 0.6–1.3)
EOSINOPHIL # BLD AUTO: 0.26 THOUSAND/ΜL (ref 0–0.61)
EOSINOPHIL NFR BLD AUTO: 4 % (ref 0–6)
ERYTHROCYTE [DISTWIDTH] IN BLOOD BY AUTOMATED COUNT: 14.5 % (ref 11.6–15.1)
EST. AVERAGE GLUCOSE BLD GHB EST-MCNC: 117 MG/DL
FERRITIN SERPL-MCNC: 5 NG/ML (ref 11–307)
GFR SERPL CREATININE-BSD FRML MDRD: 106 ML/MIN/1.73SQ M
GLUCOSE P FAST SERPL-MCNC: 83 MG/DL (ref 65–99)
HBA1C MFR BLD: 5.7 %
HCT VFR BLD AUTO: 36.4 % (ref 34.8–46.1)
HDLC SERPL-MCNC: 66 MG/DL
HGB BLD-MCNC: 10.9 G/DL (ref 11.5–15.4)
IMM GRANULOCYTES # BLD AUTO: 0.01 THOUSAND/UL (ref 0–0.2)
IMM GRANULOCYTES NFR BLD AUTO: 0 % (ref 0–2)
IRON SATN MFR SERPL: 10 % (ref 15–50)
IRON SERPL-MCNC: 36 UG/DL (ref 50–212)
LDLC SERPL CALC-MCNC: 83 MG/DL (ref 0–100)
LYMPHOCYTES # BLD AUTO: 1.57 THOUSANDS/ΜL (ref 0.6–4.47)
LYMPHOCYTES NFR BLD AUTO: 25 % (ref 14–44)
MCH RBC QN AUTO: 25.2 PG (ref 26.8–34.3)
MCHC RBC AUTO-ENTMCNC: 29.9 G/DL (ref 31.4–37.4)
MCV RBC AUTO: 84 FL (ref 82–98)
MONOCYTES # BLD AUTO: 0.55 THOUSAND/ΜL (ref 0.17–1.22)
MONOCYTES NFR BLD AUTO: 9 % (ref 4–12)
NEUTROPHILS # BLD AUTO: 3.76 THOUSANDS/ΜL (ref 1.85–7.62)
NEUTS SEG NFR BLD AUTO: 61 % (ref 43–75)
NONHDLC SERPL-MCNC: 98 MG/DL
NRBC BLD AUTO-RTO: 0 /100 WBCS
PLATELET # BLD AUTO: 341 THOUSANDS/UL (ref 149–390)
PMV BLD AUTO: 12.5 FL (ref 8.9–12.7)
POTASSIUM SERPL-SCNC: 4.6 MMOL/L (ref 3.5–5.3)
PROT SERPL-MCNC: 6.8 G/DL (ref 6.4–8.4)
RBC # BLD AUTO: 4.32 MILLION/UL (ref 3.81–5.12)
SODIUM SERPL-SCNC: 140 MMOL/L (ref 135–147)
TIBC SERPL-MCNC: 377 UG/DL (ref 250–450)
TRIGL SERPL-MCNC: 77 MG/DL
UIBC SERPL-MCNC: 341 UG/DL (ref 155–355)
WBC # BLD AUTO: 6.22 THOUSAND/UL (ref 4.31–10.16)

## 2024-10-19 PROCEDURE — 83036 HEMOGLOBIN GLYCOSYLATED A1C: CPT

## 2024-10-19 PROCEDURE — 85025 COMPLETE CBC W/AUTO DIFF WBC: CPT

## 2024-10-19 PROCEDURE — 36415 COLL VENOUS BLD VENIPUNCTURE: CPT

## 2024-10-19 PROCEDURE — 80053 COMPREHEN METABOLIC PANEL: CPT

## 2024-10-19 PROCEDURE — 83540 ASSAY OF IRON: CPT

## 2024-10-19 PROCEDURE — 82306 VITAMIN D 25 HYDROXY: CPT

## 2024-10-19 PROCEDURE — 82728 ASSAY OF FERRITIN: CPT

## 2024-10-19 PROCEDURE — 80061 LIPID PANEL: CPT

## 2024-10-19 PROCEDURE — 83550 IRON BINDING TEST: CPT

## 2024-10-29 ENCOUNTER — OFFICE VISIT (OUTPATIENT)
Dept: BARIATRICS | Facility: CLINIC | Age: 44
End: 2024-10-29
Payer: COMMERCIAL

## 2024-10-29 VITALS
SYSTOLIC BLOOD PRESSURE: 122 MMHG | HEIGHT: 65 IN | BODY MASS INDEX: 48.82 KG/M2 | WEIGHT: 293 LBS | TEMPERATURE: 97.6 F | DIASTOLIC BLOOD PRESSURE: 86 MMHG | HEART RATE: 68 BPM

## 2024-10-29 DIAGNOSIS — Z98.84 BARIATRIC SURGERY STATUS: ICD-10-CM

## 2024-10-29 DIAGNOSIS — E66.01 MORBID (SEVERE) OBESITY DUE TO EXCESS CALORIES (HCC): ICD-10-CM

## 2024-10-29 DIAGNOSIS — Z48.815 ENCOUNTER FOR SURGICAL AFTERCARE FOLLOWING SURGERY OF DIGESTIVE SYSTEM: Primary | ICD-10-CM

## 2024-10-29 DIAGNOSIS — K91.2 POSTSURGICAL MALABSORPTION: ICD-10-CM

## 2024-10-29 DIAGNOSIS — R73.03 PREDIABETES: ICD-10-CM

## 2024-10-29 PROCEDURE — 99204 OFFICE O/P NEW MOD 45 MIN: CPT | Performed by: NURSE PRACTITIONER

## 2024-10-29 NOTE — PROGRESS NOTES
Assessment/Plan:     Patient ID: Stormy Smith is a 43 y.o. female.     Bariatric Surgery Status/BMI 52/Prediabetes    -s/p Aurora-En-Y Gastric Bypass with Dr. Bray at [facility] in 2012. Presents to the office today to establish care with concerns of weight gain. She would like to get back on track with diet and exercise and needs some assistance and guidance. Noticed her hgba1c now is prediabetic range and is fearful of having diabetes. Has been doing well up until 2017. Was tracking her caloric intake, walking 5 miles per day and eating healthy. Was able to achieve priscilla weight of 240 lbs. After 2017, underwent open lap abdominal surgery requiring wound vac placement for 3 months and since then, she has not feel motivated to continue with her weight loss journey. Struggles with cravings and mindless eating when she is undergoing stress. Not active currently due to current job - long hour shifts. Currently not tracking caloric intake but knows how. Interested in dietitian visits to help with menu planning. She would like to avoid any further surgery if possible for weight loss.     PLAN:     - AOMs information provided. Patient would like to defer this for now.   - follow up with RD and SW for post op support/menu planning/emotional eating habits.   - healthy habits discussed. UGI ordered to evaluate anatomy.   - Track caloric intake of 6340-1911 calories per day. Track steps with goal of 5k steps.   - Routine follow up in 3 months for MWM follow up. Follow up in 1 year for annual visit.   - Continue with healthy lifestyle, adequate protein intake of 60 gm, fluid intake of at least 64 oz.   - Continue with MVI daily.   - Activity as tolerated.   - Labs ordered and will adjust accordingly if any deficiency.   - Follow up with RD and SW as needed.           Continued/Maintain healthy weight loss with good nutrition intakes.  Adequate hydration with at least 64oz. fluid intake.  Follow diet as discussed.  Follow vitamin and  mineral recommendations as reviewed with you.  Exercise as tolerated.    Colonoscopy referral made: due - will be scheduling for one soon.   Mammogram - UTD    Follow-up in 1 YEAR FOR ANNUAL VISIT; 3 month MWM f/u. We kindly ask that your arrive 15 minutes before your scheduled appointment time with your provider to allow our staff to room you, get your vital signs and update your chart.    Get lab work done prior to visit. Please call the office if you need a script.  It is recommended to check with your insurance BEFORE getting labs done to make sure they are covered by your policy.      Call our office if you have any problems with abdominal pain especially associated with fever, chills, nausea, vomiting or any other concerns.    All  Post-bariatric surgery patients should be aware that very small quantities of any alcohol can cause impairment and it is very possible not to feel the effect. The effect can be in the system for several hours.  It is also a stomach irritant.     It is advised to AVOID alcohol, Nonsteroidal antiinflammatory drugs (NSAIDS) and nicotine of all forms . Any of these can cause stomach irritation/pain.    Discussed the effects of alcohol on a bariatric patient and the increased impairment risk.     Keep up the good work!     Postsurgical Malabsorption   -At risk for malabsorption of vitamins/minerals secondary to malabsorption and restriction of intake from bariatric surgery  -NOT Currently taking adequate postop bariatric surgery vitamin supplementation - recommended to add calcium  -Last set of bariatric labs completed on 10/19/2024 and showed low vitamin D, low iron  -Next set of bariatric labs ordered for approximately 3 months  -Patient received education about the importance of adhering to a lifelong supplementation regimen to avoid vitamin/mineral deficiencies      Diagnoses and all orders for this visit:    Encounter for surgical aftercare following surgery of digestive system  -      FL UPPER GI UGI; Future  -     CBC; Future  -     Comprehensive metabolic panel; Future  -     Folate; Future  -     Iron Panel (Includes Ferritin, Iron Sat%, Iron, and TIBC); Future  -     PTH, intact; Future  -     Vitamin A; Future  -     Vitamin B1, whole blood; Future  -     Vitamin B12; Future  -     Vitamin D 25 hydroxy; Future  -     Zinc; Future  -     Hemoglobin A1C; Future    Bariatric surgery status  -     Ambulatory Referral to Weight Management  -     FL UPPER GI UGI; Future  -     CBC; Future  -     Comprehensive metabolic panel; Future  -     Folate; Future  -     Iron Panel (Includes Ferritin, Iron Sat%, Iron, and TIBC); Future  -     PTH, intact; Future  -     Vitamin A; Future  -     Vitamin B1, whole blood; Future  -     Vitamin B12; Future  -     Vitamin D 25 hydroxy; Future  -     Zinc; Future  -     Hemoglobin A1C; Future    Postsurgical malabsorption  -     FL UPPER GI UGI; Future  -     CBC; Future  -     Comprehensive metabolic panel; Future  -     Folate; Future  -     Iron Panel (Includes Ferritin, Iron Sat%, Iron, and TIBC); Future  -     PTH, intact; Future  -     Vitamin A; Future  -     Vitamin B1, whole blood; Future  -     Vitamin B12; Future  -     Vitamin D 25 hydroxy; Future  -     Zinc; Future    Morbid (severe) obesity due to excess calories (HCC)  -     FL UPPER GI UGI; Future  -     CBC; Future  -     Comprehensive metabolic panel; Future  -     Folate; Future  -     Iron Panel (Includes Ferritin, Iron Sat%, Iron, and TIBC); Future  -     PTH, intact; Future  -     Vitamin A; Future  -     Vitamin B1, whole blood; Future  -     Vitamin B12; Future  -     Vitamin D 25 hydroxy; Future  -     Zinc; Future    Prediabetes  -     Hemoglobin A1C; Future         Subjective:      Patient ID: Stormy Smith is a 43 y.o. female.    -s/p Aurora-En-Y Gastric Bypass with Dr. Bray at [facility] in 2012. Presents to the office today to establish care with concerns of weight gain. She would like to  get back on track with diet and exercise and needs some assistance and guidance. Noticed her hgba1c now is prediabetic range and is fearful of having diabetes. Has been doing well up until 2017. Was tracking her caloric intake, walking 5 miles per day and eating healthy. Was able to achieve priscilla weight of 240 lbs. After 2017, underwent open lap abdominal surgery requiring wound vac placement for 3 months and since then, she has not feel motivated to continue with her weight loss journey. Struggles with cravings and mindless eating when she is undergoing stress. Not active currently due to current job - long hour shifts. Currently not tracking caloric intake but knows how. Interested in dietitian visits to help with menu planning. She would like to avoid any further surgery if possible for weight loss.     Initial: 382 lbs   Current: 312 lbs   EWL: (Weight loss is ahead of schedule at this post surgical period.)  Priscilla: 240 lbs   Goal - 170 lbs   Current BMI is Body mass index is 52.73 kg/m².    Tolerating a regular diet-yes  Eating at least 60 grams of protein per day-yes  Following 30/60 minute rule with liquids-yes  Drinking at least 64 ounces of fluid per day-no - encouraged to increase this.   Drinking carbonated beverages-no  Sufficient exercise-no - finds it difficult due to work.   Using NSAIDs regularly-no  Using nicotine-no  Using alcohol-occasionally   Supplements:  Prenatal Multivitamins, Vitron C, B-complex, Vitamin D3 5000 IU daily.     The following portions of the patient's history were reviewed and updated as appropriate: allergies, current medications, past family history, past medical history, past social history, past surgical history and problem list.    Review of Systems   Constitutional:  Positive for activity change, appetite change, fatigue and unexpected weight change.   Respiratory: Negative.     Cardiovascular: Negative.    Gastrointestinal: Negative.    Musculoskeletal: Negative.   "  Neurological: Negative.    Psychiatric/Behavioral: Negative.           Objective:    /86   Pulse 68   Temp 97.6 °F (36.4 °C) (Tympanic)   Ht 5' 4.5\" (1.638 m)   Wt (!) 142 kg (312 lb)   BMI 52.73 kg/m²      Physical Exam  Vitals and nursing note reviewed.   Constitutional:       Appearance: Normal appearance. She is obese.   Cardiovascular:      Rate and Rhythm: Normal rate and regular rhythm.      Pulses: Normal pulses.      Heart sounds: Normal heart sounds.   Pulmonary:      Effort: Pulmonary effort is normal.      Breath sounds: Normal breath sounds.   Abdominal:      General: Bowel sounds are normal.      Palpations: Abdomen is soft.      Tenderness: There is no abdominal tenderness.   Musculoskeletal:         General: Normal range of motion.   Skin:     General: Skin is warm and dry.   Neurological:      General: No focal deficit present.      Mental Status: She is alert and oriented to person, place, and time.   Psychiatric:         Mood and Affect: Mood normal.         Behavior: Behavior normal.         Thought Content: Thought content normal.         Judgment: Judgment normal.         I have spent a total time of 45 minutes in caring for this patient on the day of the visit/encounter including Diagnostic results, Prognosis, Risks and benefits of tx options, Instructions for management, Patient and family education, Importance of tx compliance, Risk factor reductions, Impressions, Counseling / Coordination of care, Documenting in the medical record, Reviewing / ordering tests, medicine, procedures  , Obtaining or reviewing history  , and reviewing healthy habits, tracking caloric intake, vitamin requirements.  .      "

## 2024-10-29 NOTE — PROGRESS NOTES
Date of surgery: 2012  Procedure: RNY   Performing surgeon: Asa    Initial Weight - 382lb  Current Weight -312.5lb  Ramez Weight - 240 lb  Total Body Weight Loss (EWL)- N/A  EWL% - N/A  TWB % -N/A

## 2024-10-29 NOTE — PATIENT INSTRUCTIONS
- obtain UGI (xray)  - follow up in 3 months for medical follow up.   - follow up in 1 year for surgical annual   - Refer to [facility] and  for menu planning and emotional eating habits.   - Track caloric intake of 7338-3145 calories using myfitnesspal/lose it.   - Track steps. Goal of 5000 steps per day to start.   - Try 30/30 minute rule - avoid drinking 30 minutes before and after and while eating.   - increase fluid intake - 64 oz of fluids per day is goal.   - Start out with proteins first then vegetables.

## 2024-10-30 PROBLEM — Z12.11 SCREENING FOR MALIGNANT NEOPLASM OF COLON: Status: RESOLVED | Noted: 2024-09-30 | Resolved: 2024-10-30

## 2024-11-04 ENCOUNTER — TELEPHONE (OUTPATIENT)
Dept: BARIATRICS | Facility: CLINIC | Age: 44
End: 2024-11-04

## 2024-11-04 NOTE — TELEPHONE ENCOUNTER
Tata contacted Stormy to inform her of $75 charge for one hour B/H appt or $40 for 30 mins. VM left informing pt of charges, tata call back number, ability to cancel appt via [employer] is she is unable or unwilling to pay at this time. [employer] also sent notifying pt of cost.

## 2024-11-04 NOTE — PRE-PROCEDURE INSTRUCTIONS
Pre-Surgery Instructions:   Medication Instructions    albuterol (ProAir HFA) 90 mcg/act inhaler Uses PRN- OK to take day of surgery    cyclobenzaprine (FLEXERIL) 5 mg tablet Take night before surgery    Diclofenac Sodium (VOLTAREN) 1 % Hold day of surgery.    fluticasone (FLONASE) 50 mcg/act nasal spray Uses PRN- OK to take day of surgery    loratadine (CLARITIN) 10 mg tablet Hold day of surgery.    Multiple Vitamins-Minerals (MULTIVITAMIN ADULT) TABS Stop taking 7 days prior to surgery.    norethindrone-ethinyl estradiol (Junel FE 1/20) 1-20 MG-MCG per tablet Hold day of surgery.    Medication instructions for day surgery reviewed. Please use only a sip of water to take your instructed medications. Avoid all over the counter vitamins, supplements and NSAIDS for one week prior to surgery per anesthesia guidelines. Tylenol is ok to take as needed.     You will receive a call one business day prior to surgery with an arrival time and hospital directions. If your surgery is scheduled on a Monday, the hospital will be calling you on the Friday prior to your surgery. If you have not heard from anyone by 8pm, please call the hospital supervisor through the hospital  at [phone]. ([place] [phone] or [place] [phone]).    Do not eat or drink anything after midnight the night before your surgery, including candy, mints, lifesavers, or chewing gum. Do not drink alcohol 24hrs before your surgery. Try not to smoke at least 24hrs before your surgery.       Follow the pre surgery showering instructions as listed in the “My Surgical Experience Booklet” or otherwise provided by your surgeon's office. Do not use a blade to shave the surgical area 1 week before surgery. It is okay to use a clean electric clippers up to 24 hours before surgery. Do not apply any lotions, creams, including makeup, cologne, deodorant, or perfumes after showering on the day of your surgery. Do not use dry shampoo, hair spray, hair  gel, or any type of hair products.     No contact lenses, eye make-up, or artificial eyelashes. Remove nail polish, including gel polish, and any artificial, gel, or acrylic nails if possible. Remove all jewelry including rings and body piercing jewelry.     Wear causal clothing that is easy to take on and off. Consider your type of surgery.    Keep any valuables, jewelry, piercings at home. Please bring any specially ordered equipment (sling, braces) if indicated.    Arrange for a responsible person to drive you to and from the hospital on the day of your surgery. Please confirm the visitor policy for the day of your procedure when you receive your phone call with an arrival time.     Call the surgeon's office with any new illnesses, exposures, or additional questions prior to surgery.    Please reference your “My Surgical Experience Booklet” for additional information to prepare for your upcoming surgery.

## 2024-11-12 ENCOUNTER — APPOINTMENT (OUTPATIENT)
Dept: LAB | Facility: CLINIC | Age: 44
End: 2024-11-12

## 2024-11-12 ENCOUNTER — OCCMED (OUTPATIENT)
Dept: URGENT CARE | Facility: CLINIC | Age: 44
End: 2024-11-12

## 2024-11-12 DIAGNOSIS — Z02.1 PRE-EMPLOYMENT HEALTH SCREENING EXAMINATION: Primary | ICD-10-CM

## 2024-11-12 DIAGNOSIS — Z02.1 PRE-EMPLOYMENT HEALTH SCREENING EXAMINATION: ICD-10-CM

## 2024-11-12 LAB
MEV IGG SER QL IA: NORMAL
MUV IGG SER QL IA: NORMAL
RUBV IGG SERPL IA-ACNC: 24.8 IU/ML
VZV IGG SER QL IA: NORMAL

## 2024-11-12 PROCEDURE — 36415 COLL VENOUS BLD VENIPUNCTURE: CPT

## 2024-11-12 PROCEDURE — 86480 TB TEST CELL IMMUN MEASURE: CPT

## 2024-11-12 PROCEDURE — 86787 VARICELLA-ZOSTER ANTIBODY: CPT

## 2024-11-12 PROCEDURE — 86735 MUMPS ANTIBODY: CPT

## 2024-11-12 PROCEDURE — 86762 RUBELLA ANTIBODY: CPT

## 2024-11-12 PROCEDURE — 86765 RUBEOLA ANTIBODY: CPT

## 2024-11-13 ENCOUNTER — ANESTHESIA EVENT (OUTPATIENT)
Dept: PERIOP | Facility: HOSPITAL | Age: 44
End: 2024-11-13
Payer: COMMERCIAL

## 2024-11-13 ENCOUNTER — ANESTHESIA (OUTPATIENT)
Dept: PERIOP | Facility: HOSPITAL | Age: 44
End: 2024-11-13
Payer: COMMERCIAL

## 2024-11-13 ENCOUNTER — HOSPITAL ENCOUNTER (OUTPATIENT)
Facility: HOSPITAL | Age: 44
Setting detail: OUTPATIENT SURGERY
Discharge: HOME | End: 2024-11-13
Attending: SURGERY | Admitting: SURGERY
Payer: COMMERCIAL

## 2024-11-13 VITALS
SYSTOLIC BLOOD PRESSURE: 155 MMHG | TEMPERATURE: 97.8 F | HEIGHT: 65 IN | WEIGHT: 293 LBS | DIASTOLIC BLOOD PRESSURE: 89 MMHG | OXYGEN SATURATION: 98 % | RESPIRATION RATE: 16 BRPM | BODY MASS INDEX: 48.82 KG/M2 | HEART RATE: 70 BPM

## 2024-11-13 DIAGNOSIS — K43.2 INCISIONAL HERNIA, WITHOUT OBSTRUCTION OR GANGRENE: Primary | ICD-10-CM

## 2024-11-13 LAB
EXT PREGNANCY TEST URINE: NEGATIVE
EXT. CONTROL: NORMAL
GAMMA INTERFERON BACKGROUND BLD IA-ACNC: <0 IU/ML
GLUCOSE SERPL-MCNC: 120 MG/DL (ref 65–140)
M TB IFN-G BLD-IMP: NEGATIVE
M TB IFN-G CD4+ BCKGRND COR BLD-ACNC: 0 IU/ML
M TB IFN-G CD4+ BCKGRND COR BLD-ACNC: 0 IU/ML
MITOGEN IGNF BCKGRD COR BLD-ACNC: 2.2 IU/ML

## 2024-11-13 PROCEDURE — C1781 MESH (IMPLANTABLE): HCPCS | Performed by: SURGERY

## 2024-11-13 PROCEDURE — NC001 PR NO CHARGE: Performed by: SURGERY

## 2024-11-13 PROCEDURE — 49595 RPR AA HRN 1ST > 10 RDC: CPT | Performed by: SURGERY

## 2024-11-13 PROCEDURE — 82948 REAGENT STRIP/BLOOD GLUCOSE: CPT

## 2024-11-13 PROCEDURE — 81025 URINE PREGNANCY TEST: CPT | Performed by: SURGERY

## 2024-11-13 DEVICE — CAPSURE PERMANENT FIXATION SYSTEM, 37 CM LENGTH, 5 MM DIAMETER, 30 FASTENERS
Type: IMPLANTABLE DEVICE | Site: ABDOMEN | Status: FUNCTIONAL
Brand: CAPSURE

## 2024-11-13 DEVICE — VENTRALIGHT ST MESH WITH ECHO PS POSITIONING SYSTEM, 8" X 10" (20.3 X 25.4 CM), ELLIPSE
Type: IMPLANTABLE DEVICE | Site: ABDOMEN | Status: FUNCTIONAL
Brand: VENTRALIGHT

## 2024-11-13 RX ORDER — MIDAZOLAM HYDROCHLORIDE 2 MG/2ML
INJECTION, SOLUTION INTRAMUSCULAR; INTRAVENOUS AS NEEDED
Status: DISCONTINUED | OUTPATIENT
Start: 2024-11-13 | End: 2024-11-13

## 2024-11-13 RX ORDER — OXYCODONE HYDROCHLORIDE 5 MG/1
5 TABLET ORAL EVERY 4 HOURS PRN
Qty: 20 TABLET | Refills: 0 | Status: SHIPPED | OUTPATIENT
Start: 2024-11-13

## 2024-11-13 RX ORDER — PROPOFOL 10 MG/ML
INJECTION, EMULSION INTRAVENOUS AS NEEDED
Status: DISCONTINUED | OUTPATIENT
Start: 2024-11-13 | End: 2024-11-13

## 2024-11-13 RX ORDER — SODIUM CHLORIDE, SODIUM LACTATE, POTASSIUM CHLORIDE, CALCIUM CHLORIDE 600; 310; 30; 20 MG/100ML; MG/100ML; MG/100ML; MG/100ML
100 INJECTION, SOLUTION INTRAVENOUS CONTINUOUS
Status: DISCONTINUED | OUTPATIENT
Start: 2024-11-13 | End: 2024-11-13 | Stop reason: HOSPADM

## 2024-11-13 RX ORDER — DEXAMETHASONE SODIUM PHOSPHATE 10 MG/ML
INJECTION, SOLUTION INTRAMUSCULAR; INTRAVENOUS AS NEEDED
Status: DISCONTINUED | OUTPATIENT
Start: 2024-11-13 | End: 2024-11-13

## 2024-11-13 RX ORDER — MEPERIDINE HYDROCHLORIDE 25 MG/ML
25 INJECTION INTRAMUSCULAR; INTRAVENOUS; SUBCUTANEOUS ONCE
Status: DISCONTINUED | OUTPATIENT
Start: 2024-11-13 | End: 2024-11-13 | Stop reason: HOSPADM

## 2024-11-13 RX ORDER — ROCURONIUM BROMIDE 10 MG/ML
INJECTION, SOLUTION INTRAVENOUS AS NEEDED
Status: DISCONTINUED | OUTPATIENT
Start: 2024-11-13 | End: 2024-11-13

## 2024-11-13 RX ORDER — HYDROCODONE BITARTRATE AND ACETAMINOPHEN 5; 325 MG/1; MG/1
1 TABLET ORAL EVERY 4 HOURS PRN
Refills: 0 | Status: DISCONTINUED | OUTPATIENT
Start: 2024-11-13 | End: 2024-11-13 | Stop reason: HOSPADM

## 2024-11-13 RX ORDER — ONDANSETRON 2 MG/ML
4 INJECTION INTRAMUSCULAR; INTRAVENOUS ONCE AS NEEDED
Status: DISCONTINUED | OUTPATIENT
Start: 2024-11-13 | End: 2024-11-13 | Stop reason: HOSPADM

## 2024-11-13 RX ORDER — METOCLOPRAMIDE HYDROCHLORIDE 5 MG/ML
10 INJECTION INTRAMUSCULAR; INTRAVENOUS ONCE AS NEEDED
Status: DISCONTINUED | OUTPATIENT
Start: 2024-11-13 | End: 2024-11-13 | Stop reason: HOSPADM

## 2024-11-13 RX ORDER — LABETALOL HYDROCHLORIDE 5 MG/ML
INJECTION, SOLUTION INTRAVENOUS AS NEEDED
Status: DISCONTINUED | OUTPATIENT
Start: 2024-11-13 | End: 2024-11-13

## 2024-11-13 RX ORDER — ALBUTEROL SULFATE 0.83 MG/ML
2.5 SOLUTION RESPIRATORY (INHALATION) ONCE AS NEEDED
Status: DISCONTINUED | OUTPATIENT
Start: 2024-11-13 | End: 2024-11-13 | Stop reason: HOSPADM

## 2024-11-13 RX ORDER — SODIUM CHLORIDE 9 MG/ML
125 INJECTION, SOLUTION INTRAVENOUS CONTINUOUS
Status: DISCONTINUED | OUTPATIENT
Start: 2024-11-13 | End: 2024-11-13 | Stop reason: HOSPADM

## 2024-11-13 RX ORDER — HYDROMORPHONE HCL/PF 1 MG/ML
SYRINGE (ML) INJECTION AS NEEDED
Status: DISCONTINUED | OUTPATIENT
Start: 2024-11-13 | End: 2024-11-13

## 2024-11-13 RX ORDER — BUPIVACAINE HYDROCHLORIDE AND EPINEPHRINE 5; 5 MG/ML; UG/ML
INJECTION, SOLUTION PERINEURAL AS NEEDED
Status: DISCONTINUED | OUTPATIENT
Start: 2024-11-13 | End: 2024-11-13 | Stop reason: HOSPADM

## 2024-11-13 RX ORDER — FENTANYL CITRATE/PF 50 MCG/ML
25 SYRINGE (ML) INJECTION
Status: COMPLETED | OUTPATIENT
Start: 2024-11-13 | End: 2024-11-13

## 2024-11-13 RX ORDER — HYDROMORPHONE HCL IN WATER/PF 6 MG/30 ML
0.2 PATIENT CONTROLLED ANALGESIA SYRINGE INTRAVENOUS
Status: DISCONTINUED | OUTPATIENT
Start: 2024-11-13 | End: 2024-11-13 | Stop reason: HOSPADM

## 2024-11-13 RX ORDER — HEPARIN SODIUM 5000 [USP'U]/ML
7500 INJECTION, SOLUTION INTRAVENOUS; SUBCUTANEOUS ONCE
Status: COMPLETED | OUTPATIENT
Start: 2024-11-13 | End: 2024-11-13

## 2024-11-13 RX ORDER — FENTANYL CITRATE 50 UG/ML
INJECTION, SOLUTION INTRAMUSCULAR; INTRAVENOUS AS NEEDED
Status: DISCONTINUED | OUTPATIENT
Start: 2024-11-13 | End: 2024-11-13

## 2024-11-13 RX ORDER — LABETALOL HYDROCHLORIDE 5 MG/ML
5 INJECTION, SOLUTION INTRAVENOUS
Status: DISCONTINUED | OUTPATIENT
Start: 2024-11-13 | End: 2024-11-13 | Stop reason: HOSPADM

## 2024-11-13 RX ORDER — ONDANSETRON 2 MG/ML
INJECTION INTRAMUSCULAR; INTRAVENOUS AS NEEDED
Status: DISCONTINUED | OUTPATIENT
Start: 2024-11-13 | End: 2024-11-13

## 2024-11-13 RX ORDER — HYDROMORPHONE HCL/PF 1 MG/ML
0.2 SYRINGE (ML) INJECTION EVERY 4 HOURS PRN
Refills: 0 | Status: DISCONTINUED | OUTPATIENT
Start: 2024-11-13 | End: 2024-11-13 | Stop reason: HOSPADM

## 2024-11-13 RX ORDER — LIDOCAINE HYDROCHLORIDE 10 MG/ML
INJECTION, SOLUTION EPIDURAL; INFILTRATION; INTRACAUDAL; PERINEURAL AS NEEDED
Status: DISCONTINUED | OUTPATIENT
Start: 2024-11-13 | End: 2024-11-13

## 2024-11-13 RX ADMIN — FENTANYL CITRATE 25 MCG: 50 INJECTION INTRAMUSCULAR; INTRAVENOUS at 13:23

## 2024-11-13 RX ADMIN — FENTANYL CITRATE 50 MCG: 50 INJECTION INTRAMUSCULAR; INTRAVENOUS at 10:30

## 2024-11-13 RX ADMIN — MIDAZOLAM 2 MG: 1 INJECTION INTRAMUSCULAR; INTRAVENOUS at 09:46

## 2024-11-13 RX ADMIN — ROCURONIUM 20 MG: 50 INJECTION, SOLUTION INTRAVENOUS at 10:58

## 2024-11-13 RX ADMIN — FENTANYL CITRATE 25 MCG: 50 INJECTION INTRAMUSCULAR; INTRAVENOUS at 13:11

## 2024-11-13 RX ADMIN — PROPOFOL 250 MG: 10 INJECTION, EMULSION INTRAVENOUS at 09:56

## 2024-11-13 RX ADMIN — HYDROMORPHONE HYDROCHLORIDE 0.5 MG: 1 INJECTION, SOLUTION INTRAMUSCULAR; INTRAVENOUS; SUBCUTANEOUS at 10:39

## 2024-11-13 RX ADMIN — FENTANYL CITRATE 50 MCG: 50 INJECTION INTRAMUSCULAR; INTRAVENOUS at 11:19

## 2024-11-13 RX ADMIN — LABETALOL HYDROCHLORIDE 10 MG: 5 INJECTION, SOLUTION INTRAVENOUS at 13:03

## 2024-11-13 RX ADMIN — SUGAMMADEX 300 MG: 100 INJECTION, SOLUTION INTRAVENOUS at 12:35

## 2024-11-13 RX ADMIN — HEPARIN SODIUM 7500 UNITS: 5000 INJECTION, SOLUTION INTRAVENOUS; SUBCUTANEOUS at 09:03

## 2024-11-13 RX ADMIN — ROCURONIUM 10 MG: 50 INJECTION, SOLUTION INTRAVENOUS at 11:54

## 2024-11-13 RX ADMIN — ROCURONIUM 20 MG: 50 INJECTION, SOLUTION INTRAVENOUS at 12:15

## 2024-11-13 RX ADMIN — FENTANYL CITRATE 100 MCG: 50 INJECTION INTRAMUSCULAR; INTRAVENOUS at 09:56

## 2024-11-13 RX ADMIN — Medication 3000 MG: at 10:05

## 2024-11-13 RX ADMIN — HYDROCODONE BITARTRATE AND ACETAMINOPHEN 1 TABLET: 5; 325 TABLET ORAL at 14:45

## 2024-11-13 RX ADMIN — DEXAMETHASONE SODIUM PHOSPHATE 10 MG: 10 INJECTION, SOLUTION INTRAMUSCULAR; INTRAVENOUS at 10:01

## 2024-11-13 RX ADMIN — LIDOCAINE HYDROCHLORIDE 50 MG: 10 INJECTION, SOLUTION EPIDURAL; INFILTRATION; INTRACAUDAL; PERINEURAL at 09:56

## 2024-11-13 RX ADMIN — FENTANYL CITRATE 25 MCG: 50 INJECTION INTRAMUSCULAR; INTRAVENOUS at 13:00

## 2024-11-13 RX ADMIN — HYDROMORPHONE HYDROCHLORIDE 0.2 MG: 0.2 INJECTION, SOLUTION INTRAMUSCULAR; INTRAVENOUS; SUBCUTANEOUS at 13:29

## 2024-11-13 RX ADMIN — HYDROMORPHONE HYDROCHLORIDE 0.2 MG: 0.2 INJECTION, SOLUTION INTRAMUSCULAR; INTRAVENOUS; SUBCUTANEOUS at 13:34

## 2024-11-13 RX ADMIN — FENTANYL CITRATE 25 MCG: 50 INJECTION INTRAMUSCULAR; INTRAVENOUS at 13:05

## 2024-11-13 RX ADMIN — ROCURONIUM 30 MG: 50 INJECTION, SOLUTION INTRAVENOUS at 10:20

## 2024-11-13 RX ADMIN — SODIUM CHLORIDE, SODIUM LACTATE, POTASSIUM CHLORIDE, AND CALCIUM CHLORIDE: .6; .31; .03; .02 INJECTION, SOLUTION INTRAVENOUS at 09:49

## 2024-11-13 RX ADMIN — ROCURONIUM 20 MG: 50 INJECTION, SOLUTION INTRAVENOUS at 10:37

## 2024-11-13 RX ADMIN — ONDANSETRON 4 MG: 2 INJECTION INTRAMUSCULAR; INTRAVENOUS at 12:23

## 2024-11-13 RX ADMIN — SUGAMMADEX 100 MG: 100 INJECTION, SOLUTION INTRAVENOUS at 12:39

## 2024-11-13 RX ADMIN — ROCURONIUM 50 MG: 50 INJECTION, SOLUTION INTRAVENOUS at 09:56

## 2024-11-13 NOTE — ANESTHESIA POSTPROCEDURE EVALUATION
Post-Op Assessment Note    CV Status:  Stable  Pain Score: 0    Pain management: adequate       Mental Status:  Alert and awake   Hydration Status:  Euvolemic   PONV Controlled:  Controlled   Airway Patency:  Patent     Post Op Vitals Reviewed: Yes    No anethesia notable event occurred.    Staff: CRNA           Last Filed PACU Vitals:  Vitals Value Taken Time   Temp 97.7 °F (36.5 °C) 11/13/24 1256   Pulse 76 11/13/24 1300   /88 11/13/24 1258   Resp 19 11/13/24 1300   SpO2 100 % 11/13/24 1300   Vitals shown include unfiled device data.    Modified Johnson:  No data recorded

## 2024-11-13 NOTE — DISCHARGE INSTR - AVS FIRST PAGE
After Surgery Instructions    ???When you return home, you may eat whatever you feel comfortable eating. It is common to not have much of an appetite. Do not force yourself to eat. Your appetite will usually return in 1 or 2 weeks. Some foods may still not agree with you, but this will usually pass in 4 to 6 weeks.  ???Resume all of your regular medications, including blood thinners and aspirin, after going home.  ???The day after surgery you may remove the dressings. Leave any skin tapes on the incisions in place. A dressing is then optional.  ???You may shower the day after surgery. Plain soap and water is fine. Special soaps, ointments, alcohol or peroxide is not necessary. No swimming in pools for 5 days, and do not go in the ocean until seen in the office.  ???You may become constipated, especially if taking pain medications, for the first 3 or 4 days. You may take any over the counter laxative. Potter Milk of Magnesia is good to start.  ???You will be given a prescription for pain medication. Take it as needed only. You may also take any over the counter medication for more mild pain.  ???Immediately after surgery, you may ride in a car, climb steps and walk as tolerated. When you are pain free, it is Ok to drive. Be aware that you will tire out very easily for the first few days.  ???Do not lift anything over 15 pounds for one week. Otherwise, there are not specific limitations to your activity and you may resume normal activity as tolerated.   After surgery you may return back to work on 1-2 weeks when comfortable      Contact [facility] at [phone] if any of the following occur:    ???A fever over 101° that does not respond to Tylenol. A low grade fever is not unusual after surgery and is not necessarily a sign of infection.  ???Increasing abdominal pain. Some pain after surgery is normal. Pain that was improving but has now gotten increasingly worse may not be normal and should be  reported  ???Persistent nausea and vomiting.      IF YOU HAVE ANY QUESTIONS OR CONCERNS PLEASE FEEL FREE TO CONTACT US AT ANY TIME.

## 2024-11-13 NOTE — H&P
Incisional hernia, without obstruction or gangrene  Talked her little bit about what a hernia is.  We did talk about repairing this.  I talked to her about the open and laparoscopic repair.  I told her laparoscopic approach would be reasonable.  I discussed the procedure in detail including risks, benefits, alternatives, and what to expect postoperatively.  She understands and is agreeable to proceed.     Plan: Laparoscopic incisional hernia repair           History of Present Illness     Stormy Smith is a 43 y.o. female who presents for hernia evaluation.  She had a small bowel repair done for perforation several years ago.  This was after gastric bypass surgery.  She noted about a year after that that she started having bulging to the right of the midline at about the level of her umbilicus.  Is gotten somewhat worse and bothers her now and again.  She recently had an ultrasound which documents a hernia that was about 1.6 cm.  She is here to discuss repair.        Review of Systems   Constitutional:  Negative for chills and fever.   HENT:  Negative for trouble swallowing and voice change.    Eyes:  Negative for pain and visual disturbance.   Respiratory:  Negative for cough and shortness of breath.    Cardiovascular:  Negative for chest pain and leg swelling.   Gastrointestinal:  Negative for abdominal pain, nausea and vomiting.   Endocrine: Negative for cold intolerance, heat intolerance, polydipsia, polyphagia and polyuria.   Genitourinary:  Negative for difficulty urinating and flank pain.   Musculoskeletal:  Negative for arthralgias and gait problem.   Skin:  Negative for rash and wound.   Allergic/Immunologic: Negative for food allergies.   Neurological:  Negative for seizures, syncope, weakness and headaches.   Hematological:  Negative for adenopathy.   Psychiatric/Behavioral:  Negative for confusion.    All other systems reviewed and are negative.     Past Medical History  Medical History        Past  Medical History:   Diagnosis Date    Abdominal pain 12/26/2011     ALSO 1/25/2013    Allergic       Yes    Anxiety 6/2023    Arthritis       Knees    Back pain 08/2007    Chest pain 12/2011 12/2011 ER visit. 12/27/01 Hospitalization    Foot pain 11/2007     PLANTAR FASCITIS    Gastro-esophageal reflux 2012    GERD (gastroesophageal reflux disease)      Heel pain 04/23/2008     LT    Miscarriage 2008    Obesity       MORBID WITH BMI OF 58    Rectal bleeding       Diarrhea    Small bowel obstruction (HCC)           Surgical History         Past Surgical History:   Procedure Laterality Date    CHOLECYSTECTOMY        COLONOSCOPY   02/2012    ESOPHAGOGASTRODUODENOSCOPY   02/2012    GASTRIC BYPASS   05/23/2012     Morbid Obesity    HERNIA REPAIR        OTHER SURGICAL HISTORY   11/2007     PLANTAR FASCITIS    SMALL INTESTINE SURGERY         Scar tissue removed from small intestine upper left         Family History         Family History   Problem Relation Age of Onset    Hypertension Mother      Arthritis Mother      Diabetes Father           Mellitus    Hypertension Father      Coronary artery disease Father 40         Premature    COPD Father      Heart attack Father      Heart disease Father      Heart failure Father      No Known Problems Sister      No Known Problems Maternal Grandmother      Prostate cancer Maternal Grandfather 70    No Known Problems Paternal Grandmother      No Known Problems Paternal Grandfather      Colon cancer Maternal Aunt 62    Breast cancer Maternal Aunt 62    No Known Problems Maternal Aunt      No Known Problems Maternal Aunt      No Known Problems Maternal Aunt      No Known Problems Maternal Aunt      No Known Problems Maternal Aunt      No Known Problems Maternal Aunt      No Known Problems Maternal Aunt      Breast cancer Paternal Aunt 62    No Known Problems Paternal Aunt      No Known Problems Paternal Aunt      Breast cancer Cousin 35    Diabetes Family           Melitus     "Diabetes Family           Mellitus    Colon cancer Maternal Uncle 58         Medications Ordered Prior to Encounter          Current Outpatient Medications on File Prior to Visit   Medication Sig Dispense Refill    albuterol (ProAir HFA) 90 mcg/act inhaler Inhale 2 puffs every 6 (six) hours as needed for wheezing 8.5 g 3    cyclobenzaprine (FLEXERIL) 5 mg tablet Take 1 tablet (5 mg total) by mouth daily at bedtime as needed for muscle spasms 60 tablet 1    Diclofenac Sodium (VOLTAREN) 1 % Apply 2 g topically 4 (four) times a day 1 Tube 0    FLUoxetine (PROzac) 10 mg capsule TAKE 1 CAPSULE BY MOUTH EVERY DAY 30 capsule 5    fluticasone (FLONASE) 50 mcg/act nasal spray 2 sprays into each nostril as needed         loratadine (CLARITIN) 10 mg tablet Take by mouth        Multiple Vitamins-Minerals (MULTIVITAMIN ADULT) TABS Take 1 tablet by mouth daily          No current facility-administered medications on file prior to visit.         Allergies        Allergies   Allergen Reactions    Amoxicillin Rash               Objective[]Expand by Default     /82 (BP Location: Left arm, Patient Position: Sitting, Cuff Size: Large)   Pulse 76   Resp 18   Ht 5' 4.5\" (1.638 m)   Wt (!) 142 kg (313 lb 12.8 oz)   LMP 09/04/2024 (Exact Date)   SpO2 96%   BMI 53.03 kg/m²      Physical Exam  Vitals and nursing note reviewed.   Constitutional:       General: She is not in acute distress.     Appearance: She is well-developed. She is obese. She is not diaphoretic.   HENT:      Head: Normocephalic and atraumatic.      Right Ear: External ear normal.      Left Ear: External ear normal.   Eyes:      General: No scleral icterus.     Conjunctiva/sclera: Conjunctivae normal.   Neck:      Thyroid: No thyromegaly.      Trachea: No tracheal deviation.   Cardiovascular:      Rate and Rhythm: Normal rate and regular rhythm.      Heart sounds: Normal heart sounds. No murmur heard.     No friction rub.   Pulmonary:      Effort: Pulmonary " "effort is normal. No respiratory distress.      Breath sounds: Normal breath sounds. No wheezing or rales.   Abdominal:      General: There is no distension.      Palpations: Abdomen is soft. There is no mass.      Tenderness: There is no abdominal tenderness. There is no guarding or rebound.      Comments: Midline abdomen is a well-healed incision.  Difficult to assess the hernia defect size but there does feel to be a bulging just to the right of the midline incision.   Musculoskeletal:         General: Normal range of motion.      Cervical back: Neck supple.   Lymphadenopathy:      Cervical: No cervical adenopathy.   Skin:     General: Skin is warm and dry.   Neurological:      Mental Status: She is alert and oriented to person, place, and time.   Psychiatric:         Behavior: Behavior normal.         Thought Content: Thought content normal.         Judgment: Judgment normal.   /98   Pulse 63   Temp (!) 97 °F (36.1 °C) (Temporal)   Resp 18   Ht 5' 4.5\" (1.638 m)   Wt (!) 142 kg (312 lb)   SpO2 100%   BMI 52.73 kg/m²     "

## 2024-11-13 NOTE — ANESTHESIA PREPROCEDURE EVALUATION
Procedure:  REPAIR HERNIA INCISIONAL LAPAROSCOPIC (Abdomen)    Relevant Problems   ANESTHESIA (within normal limits)      CARDIO (within normal limits)   (+) Symptomatic varicose veins of both lower extremities   (+) Varicose veins      ENDO (within normal limits)      GI/HEPATIC   (+) Gastroesophageal reflux disease      /RENAL   (+) VALERY (acute kidney injury) (HCC)      GYN (within normal limits)      HEMATOLOGY (within normal limits)      MUSCULOSKELETAL   (+) Back pain      NEURO/PSYCH   (+) Anxiety      PULMONARY   (+) Mild intermittent asthma without complication   (+) Obstructive sleep apnea      Other   (+) Class 3 severe obesity in adult (HCC)      Last inhaler use 2 months ago  NPO > 8 hours    Physical Exam    Airway    Mallampati score: II  TM Distance: >3 FB  Neck ROM: full     Dental        Cardiovascular  Rhythm: regular, Rate: normal, Cardiovascular exam normal    Pulmonary  Pulmonary exam normal Breath sounds clear to auscultation    Other Findings  post-pubertal.      Anesthesia Plan  ASA Score- 3     Anesthesia Type- general with ASA Monitors.         Additional Monitors:     Airway Plan: ETT.           Plan Factors-Exercise tolerance (METS): >4 METS.    Chart reviewed. EKG reviewed. Imaging results reviewed. Existing labs reviewed. Patient summary reviewed.          Obstructive sleep apnea risk education given perioperatively.        Induction- intravenous.    Postoperative Plan- Plan for postoperative opioid use. Planned trial extubation    Perioperative Resuscitation Plan - Level 1 - Full Code.       Informed Consent- Anesthetic plan and risks discussed with patient.  I personally reviewed this patient with the CRNA. Discussed and agreed on the Anesthesia Plan with the CRNA..

## 2024-11-13 NOTE — OP NOTE
OPERATIVE REPORT  PATIENT NAME: Stormy Smith    :  1980  MRN: [id number]  Pt Location: [facility] OR ROOM 03    SURGERY DATE: 2024    Surgeons and Role:     * Henry Lovelace MD - Primary     * Pete Nation MD - Assisting    Preop Diagnosis:  Incisional hernia [K43.2]    Post-Op Diagnosis Codes:     * Incisional hernia [K43.2]    Procedure(s):  REPAIR HERNIA INCISIONAL LAPAROSCOPIC    Specimen(s):  * No specimens in log *    Estimated Blood Loss:   10 mL    Drains:  Urethral Catheter Latex;Straight-tip 16 Fr. (Active)   Number of days: 0       Anesthesia Type:   General    Operative Indications:  Incisional hernia [K43.2]    Operative Findings:  Prior to opening the fascia, or reducing the contents of the hernia, the hernia defect was measured. The total hernia defect measured 20 cm by 15 cm and included multiple swiss-cheese defects. This was repaired as described in the body of the report below.  Ventralight ST mesh size 20.3 cm x 25.4 cm      Complications:   None    Procedure and Technique:  The patient was brought to the operating arena and placed in supine position. All regular monitoring devices were connected. The patient underwent general anesthesia with endotracheal intubation without complication. The patient received perioperative antibiotics. The patient received subcutaneous heparin in addition to bilateral lower extremity sequential compression devices for DVT prophylaxis. A timeout was performed prior to incision to ensure correct patient position, procedure, and site.    We gained laparoscopic access to the left mid abdomen using the Optiview technique.  Inspection revealed no injury from entry.  Abdomen was insufflated to 15 mmHg.  She tolerated insufflation well.  We then placed an additional 12 mm trocar on the right mid abdomen and a 5 mm trocar in the subxiphoid position.  There were multiple adhesions to the anterior abdominal wall which we carefully dissected with a combination of  sharp scissors and electrocautery.  We began to notice the extent of the hernia.  There was a large Swiss cheese type defect total dimensions as seen above.  There was bowel in this defect and this was carefully reduced using further dissection.    We decided to place an underlay mesh to cover this defect.  We did not primarily close the defect given size.  The mesh was introduced into the abdomen and the balloon was insufflated.  This was circumferentially tacked.  At the cephalad portion we are unable to get a good angle with our tacker so used laparoscopic Tonio Stanton device to place interrupted Ethibond sutures to further secure the mesh.    Upon completion of securing mesh there was circumferential coverage of the hernia defect.    Trocars were removed and abdomen desufflated.  Skin closed with Monocryl and glue.    The patient tolerated procedure well was taken to the post anesthesia care unit in stable condition. All lap, needle, and instrument counts were correct.    Dr. Lovelace was present for the entire procedure.     Patient Disposition:  PACU              SIGNATURE: Pete Nation MD  DATE: November 13, 2024  TIME: 1:08 PM

## 2024-11-14 NOTE — ANESTHESIA POSTPROCEDURE EVALUATION
Post-Op Assessment Note    CV Status:  Stable    Pain management: adequate       Mental Status:  Alert and awake   Hydration Status:  Euvolemic   PONV Controlled:  Controlled   Airway Patency:  Patent     Post Op Vitals Reviewed: Yes    No anethesia notable event occurred.    Staff: Anesthesiologist, CRNA           Last Filed PACU Vitals:  Vitals Value Taken Time   Temp 98 °F (36.7 °C) 11/13/24 1350   Pulse 65 11/13/24 1350   /76 11/13/24 1350   Resp 19 11/13/24 1350   SpO2 98 % 11/13/24 1350       Modified Johnson:  Activity: 2 (11/13/2024  1:50 PM)  Respiration: 2 (11/13/2024  1:50 PM)  Circulation: 2 (11/13/2024  1:50 PM)  Consciousness: 2 (11/13/2024  1:50 PM)  Oxygen Saturation: 2 (11/13/2024  1:50 PM)  Modified Johnson Score: 10 (11/13/2024  1:50 PM)

## 2024-11-27 ENCOUNTER — OFFICE VISIT (OUTPATIENT)
Dept: SURGERY | Facility: CLINIC | Age: 44
End: 2024-11-27

## 2024-11-27 VITALS — HEIGHT: 64 IN | TEMPERATURE: 97.8 F | WEIGHT: 293 LBS | BODY MASS INDEX: 50.02 KG/M2

## 2024-11-27 DIAGNOSIS — Z87.19 STATUS POST LAPAROSCOPIC HERNIA REPAIR: Primary | ICD-10-CM

## 2024-11-27 DIAGNOSIS — Z98.890 STATUS POST LAPAROSCOPIC HERNIA REPAIR: Primary | ICD-10-CM

## 2024-11-27 PROBLEM — K43.2 INCISIONAL HERNIA, WITHOUT OBSTRUCTION OR GANGRENE: Status: RESOLVED | Noted: 2023-08-31 | Resolved: 2024-11-27

## 2024-11-27 PROCEDURE — 99024 POSTOP FOLLOW-UP VISIT: CPT | Performed by: SURGERY

## 2024-11-27 NOTE — PROGRESS NOTES
"Name: Stormy Smith      : 1980      MRN: [id number]  Encounter Provider: Henry Lovelace MD  Encounter Date: 2024   Encounter department: [facility] [place]  :  Assessment & Plan  Status post laparoscopic hernia repair  Overall doing fairly well.  Increase activity as she feels comfortable.  She is back here if needed.           History of Present Illness     HPI  Stormy Smith is a 43 y.o. female who presents status post laparoscopic incisional hernia repair she had pain postoperatively which is gotten much better.  She is back to almost normal activity.  Tolerating diet bowels are working satisfactorily.      Review of Systems       Objective   Temp 97.8 °F (36.6 °C) (Temporal)   Ht 5' 4\" (1.626 m)   Wt (!) 143 kg (314 lb 6.4 oz)   BMI 53.97 kg/m²      Physical Exam  Abdomen: Multiple incisions healing well, soft, nontender    "

## 2024-11-29 ENCOUNTER — TELEPHONE (OUTPATIENT)
Age: 44
End: 2024-11-29

## 2024-11-29 NOTE — TELEPHONE ENCOUNTER
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 Lovelace. Patient is asking if letter can be e-mail to [email or web address] if not please send to [employer] so patient can forward to her job email. Thank you

## 2025-03-24 ENCOUNTER — OFFICE VISIT (OUTPATIENT)
Dept: URGENT CARE | Age: 45
End: 2025-03-24
Payer: COMMERCIAL

## 2025-03-24 VITALS
SYSTOLIC BLOOD PRESSURE: 139 MMHG | HEART RATE: 66 BPM | TEMPERATURE: 98.2 F | WEIGHT: 293 LBS | BODY MASS INDEX: 50.02 KG/M2 | DIASTOLIC BLOOD PRESSURE: 86 MMHG | HEIGHT: 64 IN | RESPIRATION RATE: 18 BRPM | OXYGEN SATURATION: 99 %

## 2025-03-24 DIAGNOSIS — L03.113 RIGHT ARM CELLULITIS: ICD-10-CM
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
## 2025-03-24 PROCEDURE — 99213 OFFICE O/P EST LOW 20 MIN: CPT | Performed by: STUDENT IN AN ORGANIZED HEALTH CARE EDUCATION/TRAINING PROGRAM

## 2025-03-24 RX ORDER — DOXYCYCLINE 100 MG/1
100 CAPSULE ORAL 2 TIMES DAILY
Qty: 14 CAPSULE | Refills: 0 | Status: SHIPPED | OUTPATIENT
Start: 2025-03-24 | End: 2025-03-31

## 2025-03-24 RX ORDER — MULTIVITAMIN
1 CAPSULE ORAL DAILY
COMMUNITY

## 2025-03-24 RX ORDER — METRONIDAZOLE 500 MG/1
500 TABLET ORAL EVERY 8 HOURS SCHEDULED
Qty: 21 TABLET | Refills: 0 | Status: SHIPPED | OUTPATIENT
Start: 2025-03-24 | End: 2025-03-31

## 2025-03-24 NOTE — PATIENT INSTRUCTIONS
Please take antibiotics as prescribed.  I recommend taking a probiotic or yogurt to help prevent side effects from antibiotics such as diarrhea.  Keep the area clean with soap and water daily.  I recommend applying warm compresses to the area, keep elevated as much as able to help with swelling and healing.  Make a recheck appoint with your PCP to ensure that it is healing well.  Please keep a very close eye on the area, if you notice worsening symptoms despite the oral antibiotics such as redness spreading, increasing swelling, pus drainage, or fevers or chills, please go to the ER for further evaluation.

## 2025-03-24 NOTE — LETTER
March 24, 2025     Patient: Stormy Smith   YOB: 1980   Date of Visit: 3/24/2025       To Whom it May Concern:    Stormy Smith was seen in my clinic on 3/24/2025.  Please excuse her from work today 3/20/2025.    If you have any questions or concerns, please don't hesitate to call.         Sincerely,          Mable Phelan, DO

## 2025-03-24 NOTE — PROGRESS NOTES
[facility]        NAME: Stormy Smith is a 44 y.o. female  : 1980    MRN: [id number]  DATE: 2025  TIME: 10:28 AM    Assessment and Plan   Cat bite, initial encounter [W55.01XA]  1. Cat bite, initial encounter  doxycycline monohydrate (MONODOX) 100 mg capsule    metroNIDAZOLE (FLAGYL) 500 mg tablet      2. Right arm cellulitis  doxycycline monohydrate (MONODOX) 100 mg capsule    metroNIDAZOLE (FLAGYL) 500 mg tablet            Patient Instructions     Please take antibiotics as prescribed.  I recommend taking a probiotic or yogurt to help prevent side effects from antibiotics such as diarrhea.  Keep the area clean with soap and water daily.  I recommend applying warm compresses to the area, keep elevated as much as able to help with swelling and healing.  Make a recheck appoint with your PCP to ensure that it is healing well.  Please keep a very close eye on the area, if you notice worsening symptoms despite the oral antibiotics such as redness spreading, increasing swelling, pus drainage, or fevers or chills, please go to the ER for further evaluation.        Chief Complaint     Chief Complaint   Patient presents with    Cat Bite         History of Present Illness       Patient presents for evaluation of her right forearm.  Last night, her cat was spooked and bit her on the right volar forearm.  Since then, noticed swelling, redness, clear drainage from one of the bite areas.  No fevers, chills.  She is allergic amoxicillin.  Her cat is up-to-date on vaccinations, she is up-to-date on her Tdap.        Review of Systems   Review of Systems   All other systems reviewed and are negative.        Current Medications       Current Outpatient Medications:     albuterol (ProAir HFA) 90 mcg/act inhaler, Inhale 2 puffs every 6 (six) hours as needed for wheezing, Disp: 8.5 g, Rfl: 3    cyclobenzaprine (FLEXERIL) 5 mg tablet, Take 1 tablet (5 mg total) by mouth daily at bedtime as needed for muscle  spasms, Disp: 60 tablet, Rfl: 1    Diclofenac Sodium (VOLTAREN) 1 %, Apply 2 g topically 4 (four) times a day, Disp: 1 Tube, Rfl: 0    doxycycline monohydrate (MONODOX) 100 mg capsule, Take 1 capsule (100 mg total) by mouth 2 (two) times a day for 7 days, Disp: 14 capsule, Rfl: 0    fluticasone (FLONASE) 50 mcg/act nasal spray, 2 sprays into each nostril as needed , Disp: , Rfl:     loratadine (CLARITIN) 10 mg tablet, Take 10 mg by mouth daily, Disp: , Rfl:     metroNIDAZOLE (FLAGYL) 500 mg tablet, Take 1 tablet (500 mg total) by mouth every 8 (eight) hours for 7 days, Disp: 21 tablet, Rfl: 0    Multiple Vitamin (multivitamin) capsule, Take 1 capsule by mouth daily, Disp: , Rfl:     Multiple Vitamins-Minerals (MULTIVITAMIN ADULT) TABS, Take 1 tablet by mouth daily, Disp: , Rfl:     norethindrone-ethinyl estradiol (Junel FE 1/20) 1-20 MG-MCG per tablet, Take 1 tablet by mouth daily (Patient not taking: Reported on 3/24/2025), Disp: 28 tablet, Rfl: 5    oxyCODONE (ROXICODONE) 5 immediate release tablet, Take 1 tablet (5 mg total) by mouth every 4 (four) hours as needed for moderate pain for up to 20 doses Max Daily Amount: 30 mg (Patient not taking: Reported on 3/24/2025), Disp: 20 tablet, Rfl: 0    Current Allergies     Allergies as of 03/24/2025 - Reviewed 03/24/2025   Allergen Reaction Noted    Amoxicillin Rash 04/23/2017            The following portions of the patient's history were reviewed and updated as appropriate: allergies, current medications, past family history, past medical history, past social history, past surgical history and problem list.     Past Medical History:   Diagnosis Date    Abdominal pain 12/26/2011    ALSO 1/25/2013    Allergic     Yes    Anxiety 6/2023    Arthritis     Knees    Back pain 08/2007    Chest pain 12/2011 12/2011 ER visit. 12/27/01 Hospitalization    Foot pain 11/2007    PLANTAR FASCITIS    Gastro-esophageal reflux 2012    GERD (gastroesophageal reflux disease)     Heel  "pain 04/23/2008    LT    Kidney stone 2010    Miscarriage 2008    Obesity     MORBID WITH BMI OF 58    Pre-diabetes     Rectal bleeding     Diarrhea    Small bowel obstruction (HCC) 4/16/2021       Past Surgical History:   Procedure Laterality Date    CHOLECYSTECTOMY      COLONOSCOPY  02/2012    ESOPHAGOGASTRODUODENOSCOPY  02/2012    GASTRIC BYPASS  05/23/2012    Morbid Obesity    HERNIA REPAIR      NM RPR AA HERNIA 1ST 3-10 CM REDUCIBLE N/A 11/13/2024    Procedure: REPAIR HERNIA INCISIONAL LAPAROSCOPIC;  Surgeon: Henry Lovelace MD;  Location: [facility];  Service: General    SMALL INTESTINE SURGERY      Scar tissue removed from small intestine upper left       Family History   Problem Relation Age of Onset    Hypertension Mother     Arthritis Mother     Diabetes Father         Mellitus    Hypertension Father     Coronary artery disease Father 40        Premature    COPD Father     Heart attack Father     Heart disease Father     Heart failure Father     No Known Problems Sister     Colon cancer Maternal Aunt 62    Breast cancer Maternal Aunt 62    No Known Problems Maternal Aunt     No Known Problems Maternal Aunt     No Known Problems Maternal Aunt     No Known Problems Maternal Aunt     No Known Problems Maternal Aunt     No Known Problems Maternal Aunt     No Known Problems Maternal Aunt     Colon cancer Maternal Uncle 58    Breast cancer Paternal Aunt 62    No Known Problems Paternal Aunt     No Known Problems Paternal Aunt     No Known Problems Maternal Grandmother     Prostate cancer Maternal Grandfather 70    No Known Problems Paternal Grandmother     No Known Problems Paternal Grandfather     Colon cancer Cousin     Breast cancer Cousin 35    Diabetes Family         Melitus    Diabetes Family         Mellitus         Medications have been verified.        Objective   /86   Pulse 66   Temp 98.2 °F (36.8 °C) (Temporal)   Resp 18   Ht 5' 4\" (1.626 m)   Wt 136 kg (300 lb)   LMP 03/17/2025   SpO2 " 99%   BMI 51.49 kg/m²   Patient's last menstrual period was 03/17/2025.       Physical Exam     Physical Exam  Vitals and nursing note reviewed.   Constitutional:       General: She is not in acute distress.     Appearance: She is not toxic-appearing.   HENT:      Head: Normocephalic and atraumatic.      Right Ear: External ear normal.      Left Ear: External ear normal.      Nose: Nose normal.      Mouth/Throat:      Mouth: Mucous membranes are moist.   Eyes:      Extraocular Movements: Extraocular movements intact.      Conjunctiva/sclera: Conjunctivae normal.   Musculoskeletal:        Arms:       Comments: Bite area as above with erythema, warmth, induration about the medial aspect, no fluctuance, no active drainage   Skin:     General: Skin is warm and dry.      Findings: Erythema present.   Neurological:      Mental Status: She is alert.

## 2025-04-09 DIAGNOSIS — Z00.6 ENCOUNTER FOR EXAMINATION FOR NORMAL COMPARISON OR CONTROL IN CLINICAL RESEARCH PROGRAM: ICD-10-CM

## 2025-05-23 ENCOUNTER — APPOINTMENT (OUTPATIENT)
Dept: LAB | Facility: CLINIC | Age: 45
End: 2025-05-23
Payer: COMMERCIAL

## 2025-05-23 ENCOUNTER — OFFICE VISIT (OUTPATIENT)
Dept: FAMILY MEDICINE CLINIC | Facility: CLINIC | Age: 45
End: 2025-05-23
Payer: COMMERCIAL

## 2025-05-23 VITALS
DIASTOLIC BLOOD PRESSURE: 80 MMHG | WEIGHT: 293 LBS | HEIGHT: 64 IN | TEMPERATURE: 97.5 F | BODY MASS INDEX: 50.02 KG/M2 | HEART RATE: 85 BPM | SYSTOLIC BLOOD PRESSURE: 132 MMHG | OXYGEN SATURATION: 100 %

## 2025-05-23 DIAGNOSIS — R73.03 PREDIABETES: ICD-10-CM

## 2025-05-23 DIAGNOSIS — K91.2 POSTSURGICAL MALABSORPTION: ICD-10-CM

## 2025-05-23 DIAGNOSIS — M17.0 BILATERAL PRIMARY OSTEOARTHRITIS OF KNEE: Primary | ICD-10-CM

## 2025-05-23 DIAGNOSIS — F41.9 ANXIETY: ICD-10-CM

## 2025-05-23 DIAGNOSIS — Z12.31 ENCOUNTER FOR SCREENING MAMMOGRAM FOR BREAST CANCER: ICD-10-CM

## 2025-05-23 DIAGNOSIS — Z98.84 H/O GASTRIC BYPASS: ICD-10-CM

## 2025-05-23 DIAGNOSIS — E66.813 CLASS 3 SEVERE OBESITY IN ADULT: ICD-10-CM

## 2025-05-23 DIAGNOSIS — Z48.815 ENCOUNTER FOR SURGICAL AFTERCARE FOLLOWING SURGERY OF DIGESTIVE SYSTEM: ICD-10-CM

## 2025-05-23 DIAGNOSIS — E66.01 MORBID (SEVERE) OBESITY DUE TO EXCESS CALORIES (HCC): ICD-10-CM

## 2025-05-23 DIAGNOSIS — Z98.84 BARIATRIC SURGERY STATUS: ICD-10-CM

## 2025-05-23 DIAGNOSIS — Z00.6 ENCOUNTER FOR EXAMINATION FOR NORMAL COMPARISON OR CONTROL IN CLINICAL RESEARCH PROGRAM: ICD-10-CM

## 2025-05-23 PROBLEM — N93.9 ABNORMAL UTERINE BLEEDING (AUB): Status: RESOLVED | Noted: 2021-04-21 | Resolved: 2025-05-23

## 2025-05-23 PROBLEM — E87.6 HYPOKALEMIA: Status: RESOLVED | Noted: 2021-05-01 | Resolved: 2025-05-23

## 2025-05-23 PROBLEM — N17.9 AKI (ACUTE KIDNEY INJURY) (HCC): Status: RESOLVED | Noted: 2021-04-19 | Resolved: 2025-05-23

## 2025-05-23 LAB
25(OH)D3 SERPL-MCNC: 22.4 NG/ML (ref 30–100)
ALBUMIN SERPL BCG-MCNC: 3.6 G/DL (ref 3.5–5)
ALP SERPL-CCNC: 80 U/L (ref 34–104)
ALT SERPL W P-5'-P-CCNC: 12 U/L (ref 7–52)
ANION GAP SERPL CALCULATED.3IONS-SCNC: 7 MMOL/L (ref 4–13)
AST SERPL W P-5'-P-CCNC: 15 U/L (ref 13–39)
BILIRUB SERPL-MCNC: 0.35 MG/DL (ref 0.2–1)
BUN SERPL-MCNC: 10 MG/DL (ref 5–25)
CALCIUM SERPL-MCNC: 8.7 MG/DL (ref 8.4–10.2)
CHLORIDE SERPL-SCNC: 109 MMOL/L (ref 96–108)
CO2 SERPL-SCNC: 25 MMOL/L (ref 21–32)
CREAT SERPL-MCNC: 0.6 MG/DL (ref 0.6–1.3)
ERYTHROCYTE [DISTWIDTH] IN BLOOD BY AUTOMATED COUNT: 15.6 % (ref 11.6–15.1)
EST. AVERAGE GLUCOSE BLD GHB EST-MCNC: 114 MG/DL
FERRITIN SERPL-MCNC: 3 NG/ML (ref 30–307)
FOLATE SERPL-MCNC: 16.2 NG/ML
GFR SERPL CREATININE-BSD FRML MDRD: 109 ML/MIN/1.73SQ M
GLUCOSE P FAST SERPL-MCNC: 91 MG/DL (ref 65–99)
HBA1C MFR BLD: 5.6 %
HCT VFR BLD AUTO: 33.6 % (ref 34.8–46.1)
HGB BLD-MCNC: 10.1 G/DL (ref 11.5–15.4)
IRON SATN MFR SERPL: 5 % (ref 15–50)
IRON SERPL-MCNC: 19 UG/DL (ref 50–212)
MCH RBC QN AUTO: 23.8 PG (ref 26.8–34.3)
MCHC RBC AUTO-ENTMCNC: 30.1 G/DL (ref 31.4–37.4)
MCV RBC AUTO: 79 FL (ref 82–98)
PLATELET # BLD AUTO: 373 THOUSANDS/UL (ref 149–390)
PMV BLD AUTO: 12.2 FL (ref 8.9–12.7)
POTASSIUM SERPL-SCNC: 4.1 MMOL/L (ref 3.5–5.3)
PROT SERPL-MCNC: 6.6 G/DL (ref 6.4–8.4)
PTH-INTACT SERPL-MCNC: 152.6 PG/ML (ref 12–88)
RBC # BLD AUTO: 4.25 MILLION/UL (ref 3.81–5.12)
SODIUM SERPL-SCNC: 141 MMOL/L (ref 135–147)
TIBC SERPL-MCNC: 406 UG/DL (ref 250–450)
TRANSFERRIN SERPL-MCNC: 290 MG/DL (ref 203–362)
UIBC SERPL-MCNC: 387 UG/DL (ref 155–355)
VIT B12 SERPL-MCNC: 393 PG/ML (ref 180–914)
WBC # BLD AUTO: 5.73 THOUSAND/UL (ref 4.31–10.16)

## 2025-05-23 PROCEDURE — 83970 ASSAY OF PARATHORMONE: CPT

## 2025-05-23 PROCEDURE — 84590 ASSAY OF VITAMIN A: CPT

## 2025-05-23 PROCEDURE — 80053 COMPREHEN METABOLIC PANEL: CPT

## 2025-05-23 PROCEDURE — 83540 ASSAY OF IRON: CPT

## 2025-05-23 PROCEDURE — 82306 VITAMIN D 25 HYDROXY: CPT

## 2025-05-23 PROCEDURE — 82607 VITAMIN B-12: CPT

## 2025-05-23 PROCEDURE — 20610 DRAIN/INJ JOINT/BURSA W/O US: CPT | Performed by: FAMILY MEDICINE

## 2025-05-23 PROCEDURE — 96372 THER/PROPH/DIAG INJ SC/IM: CPT | Performed by: FAMILY MEDICINE

## 2025-05-23 PROCEDURE — 85027 COMPLETE CBC AUTOMATED: CPT

## 2025-05-23 PROCEDURE — 84630 ASSAY OF ZINC: CPT

## 2025-05-23 PROCEDURE — 84425 ASSAY OF VITAMIN B-1: CPT

## 2025-05-23 PROCEDURE — 82746 ASSAY OF FOLIC ACID SERUM: CPT

## 2025-05-23 PROCEDURE — 99214 OFFICE O/P EST MOD 30 MIN: CPT | Performed by: FAMILY MEDICINE

## 2025-05-23 PROCEDURE — 83036 HEMOGLOBIN GLYCOSYLATED A1C: CPT

## 2025-05-23 PROCEDURE — 82728 ASSAY OF FERRITIN: CPT

## 2025-05-23 PROCEDURE — 83550 IRON BINDING TEST: CPT

## 2025-05-23 PROCEDURE — 36415 COLL VENOUS BLD VENIPUNCTURE: CPT

## 2025-05-23 RX ORDER — TRIAMCINOLONE ACETONIDE 40 MG/ML
40 INJECTION, SUSPENSION INTRA-ARTICULAR; INTRAMUSCULAR
Status: COMPLETED | OUTPATIENT
Start: 2025-05-23 | End: 2025-05-23

## 2025-05-23 RX ORDER — LIDOCAINE HYDROCHLORIDE 10 MG/ML
2 INJECTION, SOLUTION INFILTRATION; PERINEURAL
Status: COMPLETED | OUTPATIENT
Start: 2025-05-23 | End: 2025-05-23

## 2025-05-23 RX ORDER — TRAZODONE HYDROCHLORIDE 50 MG/1
50 TABLET ORAL
Qty: 30 TABLET | Refills: 5 | Status: SHIPPED | OUTPATIENT
Start: 2025-05-23

## 2025-05-23 RX ADMIN — TRIAMCINOLONE ACETONIDE 40 MG: 40 INJECTION, SUSPENSION INTRA-ARTICULAR; INTRAMUSCULAR at 09:40

## 2025-05-23 RX ADMIN — LIDOCAINE HYDROCHLORIDE 2 ML: 10 INJECTION, SOLUTION INFILTRATION; PERINEURAL at 09:40

## 2025-05-23 NOTE — PROGRESS NOTES
Name: Stormy Smith      : 1980      MRN: [id number]  Encounter Provider: Shahram Rice DO  Encounter Date: 2025   Encounter department: [facility]  :  Assessment & Plan  Encounter for screening mammogram for breast cancer    Orders:  •  Mammo screening bilateral w 3d and cad; Future    Bilateral primary osteoarthritis of knee  Worsening  Over the past 3 months  No improvements with topical treatments or tylenol  Cannot take nsaids due to bariatric surgery  Pain is worse on the right  Somewhat improved with muscle relaxer  Reviewed XR of knees in  and .   Is agreeable to start PT and receive CSI/arthrocentesis    Orders:  •  Ambulatory Referral to Physical Therapy; Future  •  Large joint arthrocentesis: R knee  •  lidocaine (XYLOCAINE) 1 % injection 2 mL  •  triamcinolone acetonide (Kenalog-40) 40 mg/mL injection 40 mg    H/O gastric bypass  Now follows with weight management/bariatrics       Class 3 severe obesity in adult  Under care of bariatric surgery  Orders:  •  Lipid panel; Future    Anxiety  worse in the evenings  Cannot calm mind from racing  Did not tolerate prozac  Will trial trazodone  Orders:  •  traZODone (DESYREL) 50 mg tablet; Take 1 tablet (50 mg total) by mouth daily at bedtime    Prediabetes    Orders:  •  Comprehensive metabolic panel; Future  •  Hemoglobin A1C; Future           History of Present Illness   HPI presents today for routine follow up. Main complaint is bilateral knee pain R>L  Review of Systems   Constitutional:  Negative for activity change, chills, diaphoresis and fever.   HENT:  Negative for ear pain, hearing loss, postnasal drip, rhinorrhea, sinus pressure, sinus pain, sneezing and sore throat.    Respiratory:  Negative for cough, chest tightness, shortness of breath and wheezing.    Cardiovascular:  Negative for chest pain, palpitations and leg swelling.   Gastrointestinal:  Negative for abdominal pain, blood in  "stool, constipation, diarrhea, nausea and vomiting.   Genitourinary:  Negative for dysuria, frequency, hematuria and urgency.   Musculoskeletal:  Positive for arthralgias. Negative for myalgias.   Neurological:  Negative for dizziness, syncope, weakness, light-headedness, numbness and headaches.       Objective   /80 (BP Location: Left arm, Patient Position: Sitting, Cuff Size: Large)   Pulse 85   Temp 97.5 °F (36.4 °C) (Temporal)   Ht 5' 4\" (1.626 m)   Wt (!) 141 kg (309 lb 12.8 oz)   SpO2 100%   BMI 53.18 kg/m²      Physical Exam  Constitutional:       General: She is not in acute distress.     Appearance: Normal appearance. She is well-developed. She is not diaphoretic.   HENT:      Head: Normocephalic and atraumatic.      Right Ear: Tympanic membrane, ear canal and external ear normal. There is no impacted cerumen.      Left Ear: Tympanic membrane, ear canal and external ear normal. There is no impacted cerumen.      Nose: Nose normal. No congestion or rhinorrhea.      Mouth/Throat:      Mouth: Mucous membranes are moist.      Pharynx: Oropharynx is clear. No oropharyngeal exudate.     Eyes:      Conjunctiva/sclera: Conjunctivae normal.      Pupils: Pupils are equal, round, and reactive to light.     Neck:      Vascular: No JVD.     Cardiovascular:      Rate and Rhythm: Normal rate and regular rhythm.      Heart sounds: Normal heart sounds. No murmur heard.     No friction rub. No gallop.   Pulmonary:      Effort: Pulmonary effort is normal. No respiratory distress.      Breath sounds: Normal breath sounds. No wheezing or rales.   Chest:      Chest wall: No tenderness.   Abdominal:      General: Bowel sounds are normal. There is no distension.      Palpations: Abdomen is soft.      Tenderness: There is no abdominal tenderness. There is no right CVA tenderness, left CVA tenderness, guarding or rebound.     Musculoskeletal:         General: No tenderness. Normal range of motion.      Cervical back: No " tenderness.   Lymphadenopathy:      Cervical: No cervical adenopathy.     Skin:     General: Skin is warm and dry.     Neurological:      Mental Status: She is alert and oriented to person, place, and time.      Cranial Nerves: No cranial nerve deficit.     Psychiatric:         Mood and Affect: Mood and affect normal.         Behavior: Behavior normal.         Large joint arthrocentesis: R knee    Performed by: Shahram Rice DO  Authorized by: Shahram Rice DO    Universal Protocol:  procedure performed by consultantConsent: Verbal consent obtained  Risks and benefits: risks, benefits and alternatives were discussed  Consent given by: patient  Patient identity confirmed: verbally with patient  Supporting Documentation  Indications: pain     Is this a Visco injection? NoProcedure Details  Location: knee - R knee  Preparation: Patient was prepped and draped in the usual sterile fashion  Needle size: 25 G  Ultrasound guidance: no  Approach: anterolateral  Medications administered: 2 mL lidocaine 1 %; 40 mg triamcinolone acetonide 40 mg/mL    Aspirate amount: 0 mL  Patient tolerance: patient tolerated the procedure well with no immediate complications  Dressing:  Sterile dressing applied

## 2025-05-23 NOTE — ASSESSMENT & PLAN NOTE
Worsening  Over the past 3 months  No improvements with topical treatments or tylenol  Cannot take nsaids due to bariatric surgery  Pain is worse on the right  Somewhat improved with muscle relaxer  Reviewed XR of knees in 2021 and 2022.   Is agreeable to start PT and receive CSI/arthrocentesis    Orders:  •  Ambulatory Referral to Physical Therapy; Future  •  Large joint arthrocentesis: R knee  •  lidocaine (XYLOCAINE) 1 % injection 2 mL  •  triamcinolone acetonide (Kenalog-40) 40 mg/mL injection 40 mg

## 2025-05-23 NOTE — PATIENT INSTRUCTIONS
If trazodone does not help with sleep after 2 weeks, increase to 100mg at bedtime. Try that for another 2 weeks and if it still does not help, increase to 150mg. At that point if you are not receiving benefit, let me know and we can try something else.

## 2025-05-23 NOTE — ASSESSMENT & PLAN NOTE
worse in the evenings  Cannot calm mind from racing  Did not tolerate prozac  Will trial trazodone  Orders:  •  traZODone (DESYREL) 50 mg tablet; Take 1 tablet (50 mg total) by mouth daily at bedtime

## 2025-05-25 LAB — ZINC SERPL-MCNC: 59 UG/DL (ref 44–115)

## 2025-05-27 LAB
VIT A SERPL-MCNC: 12.4 UG/DL (ref 20.1–62)
VIT B1 BLD-SCNC: 109.3 NMOL/L (ref 66.5–200)

## 2025-05-28 ENCOUNTER — OFFICE VISIT (OUTPATIENT)
Dept: BARIATRICS | Facility: CLINIC | Age: 45
End: 2025-05-28
Payer: COMMERCIAL

## 2025-05-28 VITALS
HEIGHT: 64 IN | SYSTOLIC BLOOD PRESSURE: 130 MMHG | HEART RATE: 78 BPM | BODY MASS INDEX: 50.02 KG/M2 | DIASTOLIC BLOOD PRESSURE: 82 MMHG | TEMPERATURE: 96.8 F | WEIGHT: 293 LBS

## 2025-05-28 DIAGNOSIS — G47.33 OSA (OBSTRUCTIVE SLEEP APNEA): ICD-10-CM

## 2025-05-28 DIAGNOSIS — Z48.815 ENCOUNTER FOR SURGICAL AFTERCARE FOLLOWING SURGERY OF DIGESTIVE SYSTEM: Primary | ICD-10-CM

## 2025-05-28 DIAGNOSIS — E66.01 MORBID (SEVERE) OBESITY DUE TO EXCESS CALORIES (HCC): ICD-10-CM

## 2025-05-28 DIAGNOSIS — Z98.84 BARIATRIC SURGERY STATUS: ICD-10-CM

## 2025-05-28 PROCEDURE — 99214 OFFICE O/P EST MOD 30 MIN: CPT | Performed by: NURSE PRACTITIONER

## 2025-05-28 RX ORDER — TIRZEPATIDE 2.5 MG/.5ML
2.5 INJECTION, SOLUTION SUBCUTANEOUS WEEKLY
Qty: 2 ML | Refills: 0 | Status: SHIPPED | OUTPATIENT
Start: 2025-05-28 | End: 2025-06-25

## 2025-05-28 NOTE — PROGRESS NOTES
Date of surgery: 2012  Procedure: RNY  Performing Surgeon: KIRIT    Initial Weight - 312.5 lb  Current Weight - 304 lbs  Ramez Weight - 240 lb  Total Body Weight Loss (EWL)-   EWL% -   TBW % -

## 2025-05-28 NOTE — PATIENT INSTRUCTIONS
- CONTINUE WITH THE FOLLOWING VITAMINS:     - your bariatric vitamin, along with viactiv 2 chews.   - increase vitamin D3 to 4050-2521 IU DAILY.   - add vitron C once a day  - REPEAT LABS IN 3 MONTHS     I have sent Zebound to your pharmacy. The prior authorization process will been done through our prior authorization team and can take up to 3-4 weeks to process through the insurance.     - Start Zepbound 2.5 mg subcutaneously weekly. After you have taken the second pen, please give me an update, as we will likely increase the dose the next month if you are tolerating it well.     - Side effects of Zepbound include nausea, vomiting, diarrhea, or constipation. Keep an eye on your heart rate while on Zepbound. If you resting heart rate is greater than 100 beats per minutes, please notify me. If you develop severe abdominal pain, stop Zepbound and go to the emergency room, as that could be a sign of pancreatitis.     - Please notify me if you have surgery, upper endoscopy, or colonoscopy scheduled, as we typically hold Zepbound for one week prior to the procedure.     - Zepbound can reduce the effectiveness of oral hormonal birth control (birth control pills). Recommend a barrier backup method such as condoms to prevent pregnancy.

## 2025-05-28 NOTE — PROGRESS NOTES
Assessment/Plan:    MORBID OBESITY/BMI 52/EVELIA    -Discussed role of weight loss medications.  -Initial weight loss goal of 5-10% weight loss for improved overall health  -Reviewed Screening labs - ordered by PCP.    -patient has maintained/performed healthy dietary changes and increased physical activity for at least 6 months prior to initiation of AOMs.   - has EVELIA - interested in getting repeat testing for CPAP machine. Will refer to sleep medicine  -Patient is interested in pursuing Zepbound  Discussed medication options  Recommend treatment with Zepbound  Medication Contract Signed   Discussed expected weight loss of approximately 20% along with lifestyle modifications  Discussed risks/side effects of medication and demonstrated pen device  Recommend small/low fat meals and stop eating when full to avoid side effects.  Discussed importance of adequate protein intake and strength training to reduce risk of muscle loss.   Discussed need to stop medication for at least 1 week prior to planned surgery/endoscopy  Denies hx Pancreatitis or FH of Medullary cell Thyroid CA/MEN2 syndrome    Start Zepbound  2.5mg weekly x 4 weeks and titrate  Advised to contact office in 2 weeks with update regarding tolerability and at that time will increase to 5mg dose if tolerating medication with no significant side effects.         Follow up in approximately 3 months with Surgical Advanced Practitioner.  Goals:  Food log (ie.) www.[email or web address],[email or web address],[email or web address],[employer] Tax.com,etc. baritastic  No sugary beverages. At least 64oz of water daily.  Increase physical activity by 10 minutes daily. Gradually increase physical activity to a goal of 5 days per week for 30 minutes of MODERATE intensity PLUS 2 days per week of FULL BODY resistance training  5-10 servings of fruits and vegetables per day and 25-35 grams of dietary fiber per day, gradually increasing  No sugary beverages. At least 64oz of water daily. and  Increase physical activity by 10 minutes daily  Increase physical activity by 10 minutes daily, Practice lesson plans 1-6 in bariatric manual , Practice 30/60 rule, Gradually increase physical activity to a goal of 5 days per week for 30 minutes of MODERATE intensity PLUS 2 days per week of FULL BODY resistance training, Continue with dietary and behavioral recommendations outlined in bariatric manual, Continue to take recommended bariatric vitamins as directed, Goal protein intake of 60-80 grams per day, 5-10 servings of fruits and vegetables per day, 25-35 grams of dietary fiber per day, and 9710-5528 calories per day    Bariatric surgery status - s/p RNYGB in 2012. Reviewed labs -multiple vitamin deficiencies. Recently has been taking her vitamins consistently in the past 1.5 weeks.   - on procare bariatric with 45 mg of iron, probiotic, viativ 2 chews, vitamin D3 1000 IU daily.  - recommended to add vitron-c, increase Vitamin D3 to 2123-2326 IU daily.   - repeat labs in 3 months.     Diagnoses and all orders for this visit:    Encounter for surgical aftercare following surgery of digestive system  -     CBC; Future  -     Iron Panel (Includes Ferritin, Iron Sat%, Iron, and TIBC); Future  -     Methylmalonic acid, serum; Future  -     PTH, intact; Future  -     Vitamin A; Future  -     Vitamin B12; Future  -     Vitamin D 25 hydroxy; Future    EVELIA (obstructive sleep apnea)  -     CBC; Future  -     Iron Panel (Includes Ferritin, Iron Sat%, Iron, and TIBC); Future  -     Methylmalonic acid, serum; Future  -     PTH, intact; Future  -     Vitamin A; Future  -     Vitamin B12; Future  -     Vitamin D 25 hydroxy; Future  -     tirzepatide (Zepbound) 2.5 mg/0.5 mL auto-injector; Inject 0.5 mL (2.5 mg total) under the skin once a week for 28 days    Bariatric surgery status  -     CBC; Future  -     Iron Panel (Includes Ferritin, Iron Sat%, Iron, and TIBC); Future  -     Methylmalonic acid, serum; Future  -     PTH,  intact; Future  -     Vitamin A; Future  -     Vitamin B12; Future  -     Vitamin D 25 hydroxy; Future  -     Ambulatory referral to Sleep Medicine; Future    Morbid (severe) obesity due to excess calories (HCC)  -     CBC; Future  -     Iron Panel (Includes Ferritin, Iron Sat%, Iron, and TIBC); Future  -     Methylmalonic acid, serum; Future  -     PTH, intact; Future  -     Vitamin A; Future  -     Vitamin B12; Future  -     Vitamin D 25 hydroxy; Future  -     tirzepatide (Zepbound) 2.5 mg/0.5 mL auto-injector; Inject 0.5 mL (2.5 mg total) under the skin once a week for 28 days    BMI 50.0-59.9, adult (HCC)  -     CBC; Future  -     Iron Panel (Includes Ferritin, Iron Sat%, Iron, and TIBC); Future  -     Methylmalonic acid, serum; Future  -     PTH, intact; Future  -     Vitamin A; Future  -     Vitamin B12; Future  -     Vitamin D 25 hydroxy; Future  -     tirzepatide (Zepbound) 2.5 mg/0.5 mL auto-injector; Inject 0.5 mL (2.5 mg total) under the skin once a week for 28 days  -     Ambulatory referral to Sleep Medicine; Future        Subjective:   Chief Complaint   Patient presents with    Follow-up     Follow up to initial consult in Oct 2024     Patient ID: Stormy Smith  is a 44 y.o. female with excess weight/obesity here to pursue medical weight management.   -s/p Aurora-En-Y Gastric Bypass with Dr. Bray at [facility] in 2012. Here for MWM consult.     Past Medical History[1]    HPI:  Obesity/Excess Weight:   BMI 52  Severity: Very Severe  Onset:  lifelong     Modifiers: Diet and Exercise and bariatric surgery   Contributing factors: Poor Food Choices, Insufficient Physical Activity, Stress/Emotional Eating, Binge Eating, Lack of knowledge of appropriate lifestyle changes, Medications, Depression, and Insufficient time to make appropriate lifestyle changes  Associated symptoms: comorbid conditions, fatigue, increased joint pain, decreased exercise capacity, body image issues, decreased self esteem, increased shortness  "of breath, decreased mobility, depression, inability to do certain activities, and clothes do not fit  Colonoscopy-Not Completed  Contraception - not on any birth control. Recommended to avoid pregnancy.     Highest weight 382 lbs  Current Weight 304 LBS   Ramez 240 lbs   Goal - 170 LBS   5% weight loss - 15.2 LBS (288.8 LBS )  20% weight loss - 60.8 lbs (243.2 lbs)      Hydration: 40-60 oz of water, coffee - chobani creamer   Alcohol: SOCIAL   Exercise: getting back slowly. Doing PT for knee, limitations with her knee, trying to do more walking and stretching.  Dining out: once a week  Occupation: general sx /MA   Sleep: Poorly -  recently started on trazadone  STOPBANG: EVELIA - have to go get another sleep study.    B: fruit, cheese or yogurt  S: none  L: bag salad with chicken  S: yogurt  D: rice chicken and beans  S: cookies, cake, sweets     Wt Readings from Last 3 Encounters:   05/28/25 (!) 138 kg (304 lb)   05/23/25 (!) 141 kg (309 lb 12.8 oz)   03/24/25 136 kg (300 lb)         Patient denies personal and family history of  pancreatitis, thyroid cancer, MEN-2 tumors.     The following portions of the patient's history were reviewed and updated as appropriate: allergies, current medications, past family history, past medical history, past social history, past surgical history, and problem list.    Past Medical History[2]  Past Surgical History[3]  Current Medications[4]    Review of Systems   Constitutional:  Positive for activity change, appetite change, fatigue and unexpected weight change.   Respiratory: Negative.     Cardiovascular: Negative.    Gastrointestinal: Negative.    Musculoskeletal:  Positive for arthralgias and back pain.   Neurological: Negative.    Psychiatric/Behavioral:  Positive for sleep disturbance.        Objective:    /82   Pulse 78   Temp (!) 96.8 °F (36 °C) (Tympanic)   Ht 5' 4\" (1.626 m)   Wt (!) 138 kg (304 lb)   LMP  (LMP Unknown)   BMI 52.18 kg/m²     Physical " Exam  Vitals and nursing note reviewed.   Constitutional:       Appearance: Normal appearance. She is obese.     Cardiovascular:      Rate and Rhythm: Normal rate and regular rhythm.      Pulses: Normal pulses.      Heart sounds: Normal heart sounds.   Pulmonary:      Effort: Pulmonary effort is normal.      Breath sounds: Normal breath sounds.   Abdominal:      General: Bowel sounds are normal.      Palpations: Abdomen is soft.      Tenderness: There is no abdominal tenderness.     Musculoskeletal:         General: Normal range of motion.     Skin:     General: Skin is warm and dry.     Neurological:      General: No focal deficit present.      Mental Status: She is alert and oriented to person, place, and time.     Psychiatric:         Mood and Affect: Mood normal.         Behavior: Behavior normal.         Thought Content: Thought content normal.         Judgment: Judgment normal.              [1]   Past Medical History:  Diagnosis Date    Abdominal pain 12/26/2011    ALSO 1/25/2013    Allergic     Yes    Anxiety 6/2023    Arthritis     Knees    Back pain 08/2007    Chest pain 12/2011 12/2011 ER visit. 12/27/01 Hospitalization    Foot pain 11/2007    PLANTAR FASCITIS    Gastro-esophageal reflux 2012    GERD (gastroesophageal reflux disease)     Heel pain 04/23/2008    LT    Kidney stone 2010    Miscarriage 2008    Obesity     MORBID WITH BMI OF 58    Pre-diabetes     Rectal bleeding     Diarrhea    Small bowel obstruction (HCC) 4/16/2021   [2]   Past Medical History:  Diagnosis Date    Abdominal pain 12/26/2011    ALSO 1/25/2013    Allergic     Yes    Anxiety 6/2023    Arthritis     Knees    Back pain 08/2007    Chest pain 12/2011 12/2011 ER visit. 12/27/01 Hospitalization    Foot pain 11/2007    PLANTAR FASCITIS    Gastro-esophageal reflux 2012    GERD (gastroesophageal reflux disease)     Heel pain 04/23/2008    LT    Kidney stone 2010    Miscarriage 2008    Obesity     MORBID WITH BMI OF 58     Pre-diabetes     Rectal bleeding     Diarrhea    Small bowel obstruction (HCC) 4/16/2021   [3]   Past Surgical History:  Procedure Laterality Date    CHOLECYSTECTOMY      COLONOSCOPY  02/2012    ESOPHAGOGASTRODUODENOSCOPY  02/2012    GASTRIC BYPASS  05/23/2012    Morbid Obesity    HERNIA REPAIR      IA RPR AA HERNIA 1ST 3-10 CM REDUCIBLE N/A 11/13/2024    Procedure: REPAIR HERNIA INCISIONAL LAPAROSCOPIC;  Surgeon: Henry Lovelace MD;  Location: [facility];  Service: General    SMALL INTESTINE SURGERY      Scar tissue removed from small intestine upper left   [4]   Current Outpatient Medications:     tirzepatide (Zepbound) 2.5 mg/0.5 mL auto-injector, Inject 0.5 mL (2.5 mg total) under the skin once a week for 28 days, Disp: 2 mL, Rfl: 0    albuterol (ProAir HFA) 90 mcg/act inhaler, Inhale 2 puffs every 6 (six) hours as needed for wheezing, Disp: 8.5 g, Rfl: 3    cyclobenzaprine (FLEXERIL) 5 mg tablet, Take 1 tablet (5 mg total) by mouth daily at bedtime as needed for muscle spasms, Disp: 60 tablet, Rfl: 1    Diclofenac Sodium (VOLTAREN) 1 %, Apply 2 g topically 4 (four) times a day, Disp: 1 Tube, Rfl: 0    fluticasone (FLONASE) 50 mcg/act nasal spray, 2 sprays into each nostril as needed, Disp: , Rfl:     loratadine (CLARITIN) 10 mg tablet, Take 10 mg by mouth in the morning., Disp: , Rfl:     Multiple Vitamin (multivitamin) capsule, Take 1 capsule by mouth in the morning., Disp: , Rfl:     Multiple Vitamins-Minerals (MULTIVITAMIN ADULT) TABS, Take 1 tablet by mouth in the morning., Disp: , Rfl:     traZODone (DESYREL) 50 mg tablet, Take 1 tablet (50 mg total) by mouth daily at bedtime, Disp: 30 tablet, Rfl: 5

## 2025-05-29 ENCOUNTER — TELEPHONE (OUTPATIENT)
Dept: BARIATRICS | Facility: CLINIC | Age: 45
End: 2025-05-29

## 2025-05-29 NOTE — TELEPHONE ENCOUNTER
PA for Zepbound 2.5 mg  APPROVED  [employer]     Date(s) approved 5/29/2025  to 5/29/2026    Case #    Patient advised by  LVM to Pt for Zepbound Aproved         [x]MyChart Message  [x]Phone call   []LMOM  []L/M to call office as no active Communication consent on file  []Unable to leave detailed message as VM not approved on Communication consent       Pharmacy advised by    []Fax  []Phone call  []Secure Chat    Specialty Pharmacy    []     Approval letter scanned into Media No      PA Case: [id number], Status: Approved, Coverage Starts on: 5/29/2025 12:00 AM, Coverage Ends on: 5/29/2026 12:00 AM. Questions? Contact [phone].. Authorization Expiration Date: May 29, 2026.

## 2025-05-29 NOTE — TELEPHONE ENCOUNTER
PA for ZepboundSUBMITTED to [employer]    via    [x]CMM-KEY: ([id number]   [x]Surescripts-Case ID #   []Availity-Auth ID # NDC #   []Faxed to plan   []Other website   []Phone call Case ID #     [x]PA sent as URGENT    All office notes, labs and other pertaining documents and studies sent. Clinical questions answered. Awaiting determination from insurance company.     Turnaround time for your insurance to make a decision on your Prior Authorization can take 7-21 business days.

## 2025-06-01 LAB
APOB+LDLR+PCSK9 GENE MUT ANL BLD/T: NOT DETECTED
BRCA1+BRCA2 DEL+DUP + FULL MUT ANL BLD/T: NOT DETECTED
MLH1+MSH2+MSH6+PMS2 GN DEL+DUP+FUL M: NOT DETECTED

## 2025-06-03 ENCOUNTER — OFFICE VISIT (OUTPATIENT)
Dept: FAMILY MEDICINE CLINIC | Facility: CLINIC | Age: 45
End: 2025-06-03
Payer: COMMERCIAL

## 2025-06-03 VITALS
HEART RATE: 74 BPM | SYSTOLIC BLOOD PRESSURE: 118 MMHG | WEIGHT: 293 LBS | OXYGEN SATURATION: 99 % | BODY MASS INDEX: 50.02 KG/M2 | DIASTOLIC BLOOD PRESSURE: 80 MMHG | HEIGHT: 64 IN

## 2025-06-03 DIAGNOSIS — M70.52 PES ANSERINUS BURSITIS OF BOTH KNEES: Primary | ICD-10-CM

## 2025-06-03 DIAGNOSIS — M70.51 PES ANSERINUS BURSITIS OF BOTH KNEES: Primary | ICD-10-CM

## 2025-06-03 PROCEDURE — 99213 OFFICE O/P EST LOW 20 MIN: CPT | Performed by: FAMILY MEDICINE

## 2025-06-03 PROCEDURE — 20610 DRAIN/INJ JOINT/BURSA W/O US: CPT | Performed by: FAMILY MEDICINE

## 2025-06-03 PROCEDURE — 96372 THER/PROPH/DIAG INJ SC/IM: CPT | Performed by: FAMILY MEDICINE

## 2025-06-03 RX ORDER — LIDOCAINE HYDROCHLORIDE 10 MG/ML
1 INJECTION, SOLUTION INFILTRATION; PERINEURAL
Status: COMPLETED | OUTPATIENT
Start: 2025-06-03 | End: 2025-06-03

## 2025-06-03 RX ORDER — TRIAMCINOLONE ACETONIDE 40 MG/ML
20 INJECTION, SUSPENSION INTRA-ARTICULAR; INTRAMUSCULAR
Status: COMPLETED | OUTPATIENT
Start: 2025-06-03 | End: 2025-06-03

## 2025-06-03 RX ADMIN — LIDOCAINE HYDROCHLORIDE 1 ML: 10 INJECTION, SOLUTION INFILTRATION; PERINEURAL at 18:00

## 2025-06-03 RX ADMIN — TRIAMCINOLONE ACETONIDE 20 MG: 40 INJECTION, SUSPENSION INTRA-ARTICULAR; INTRAMUSCULAR at 18:00

## 2025-06-03 NOTE — PROGRESS NOTES
"Name: Stormy Smith      : 1980      MRN: [id number]  Encounter Provider: Shahram Rice DO  Encounter Date: 6/3/2025   Encounter department: [facility]  :  Assessment & Plan  Pes anserinus bursitis of both knees  Recurrent  Last csi occurred in  with good results  Patient reports similar pain in the medial aspect of her tibia's b/l   Agreeable for repeat CSI today  Tolerated well  Start PT  Follow up prn  Orders:  •  Large joint arthrocentesis: bilateral pes anserine bursa  •  lidocaine (XYLOCAINE) 1 % injection 1 mL  •  lidocaine (XYLOCAINE) 1 % injection 1 mL  •  triamcinolone acetonide (Kenalog-40) 40 mg/mL injection 20 mg  •  triamcinolone acetonide (Kenalog-40) 40 mg/mL injection 20 mg           History of Present Illness   HPI presents today for evaluation of different knee pain than before. This pain feels similar to prior pes anserine bursitis. This was treated with CSI by sports medicine.   Review of Systems   Constitutional:  Negative for activity change, chills, diaphoresis and fever.   HENT:  Negative for ear pain, hearing loss, postnasal drip, rhinorrhea, sinus pressure, sinus pain, sneezing and sore throat.    Respiratory:  Negative for cough, chest tightness, shortness of breath and wheezing.    Cardiovascular:  Negative for chest pain, palpitations and leg swelling.   Gastrointestinal:  Negative for abdominal pain, blood in stool, constipation, diarrhea, nausea and vomiting.   Genitourinary:  Negative for dysuria, frequency, hematuria and urgency.   Musculoskeletal:  Positive for arthralgias. Negative for myalgias.   Neurological:  Negative for dizziness, syncope, weakness, light-headedness, numbness and headaches.       Objective   /80 (BP Location: Left arm, Patient Position: Sitting, Cuff Size: Large)   Pulse 74   Ht 5' 4\" (1.626 m)   Wt (!) 138 kg (304 lb 6.4 oz)   LMP  (LMP Unknown)   SpO2 99%   BMI 52.25 kg/m²      Physical " Exam  Constitutional:       General: She is not in acute distress.     Appearance: Normal appearance. She is well-developed. She is not diaphoretic.   HENT:      Head: Normocephalic and atraumatic.      Right Ear: Tympanic membrane, ear canal and external ear normal. There is no impacted cerumen.      Left Ear: Tympanic membrane, ear canal and external ear normal. There is no impacted cerumen.      Nose: Nose normal. No congestion or rhinorrhea.      Mouth/Throat:      Mouth: Mucous membranes are moist.      Pharynx: Oropharynx is clear. No oropharyngeal exudate.     Eyes:      Conjunctiva/sclera: Conjunctivae normal.      Pupils: Pupils are equal, round, and reactive to light.     Neck:      Vascular: No JVD.     Cardiovascular:      Rate and Rhythm: Normal rate and regular rhythm.      Heart sounds: Normal heart sounds. No murmur heard.     No friction rub. No gallop.   Pulmonary:      Effort: Pulmonary effort is normal. No respiratory distress.      Breath sounds: Normal breath sounds. No wheezing or rales.   Chest:      Chest wall: No tenderness.   Abdominal:      General: Bowel sounds are normal. There is no distension.      Palpations: Abdomen is soft.      Tenderness: There is no abdominal tenderness. There is no right CVA tenderness, left CVA tenderness, guarding or rebound.     Musculoskeletal:         General: Normal range of motion.      Cervical back: No tenderness.      Right knee: Tenderness present over the medial joint line.      Left knee: Tenderness present over the medial joint line.        Legs:    Lymphadenopathy:      Cervical: No cervical adenopathy.     Skin:     General: Skin is warm and dry.     Neurological:      Mental Status: She is alert and oriented to person, place, and time.      Cranial Nerves: No cranial nerve deficit.     Psychiatric:         Mood and Affect: Mood and affect normal.         Behavior: Behavior normal.         Large joint arthrocentesis: bilateral pes anserine  bursa    Performed by: Shahram Rice DO  Authorized by: Shahram Rice DO    Universal Protocol:  procedure performed by consultantConsent: Verbal consent obtained  Risks and benefits: risks, benefits and alternatives were discussed  Consent given by: patient    Is this a Visco injection? NoProcedure Details  Location: knee - bilateral pes anserine bursa  Preparation: Patient was prepped and draped in the usual sterile fashion  Needle size: 25 G  Approach: anteromedial    Medications (Right): 1 mL lidocaine 1 %; 20 mg triamcinolone acetonide 40 mg/mLMedications (Left): 1 mL lidocaine 1 %; 20 mg triamcinolone acetonide 40 mg/mL   Patient tolerance: patient tolerated the procedure well with no immediate complications  Dressing:  Sterile dressing applied

## 2025-06-09 ENCOUNTER — EVALUATION (OUTPATIENT)
Dept: PHYSICAL THERAPY | Facility: REHABILITATION | Age: 45
End: 2025-06-09
Attending: FAMILY MEDICINE
Payer: COMMERCIAL

## 2025-06-09 DIAGNOSIS — M25.562 CHRONIC PAIN OF BOTH KNEES: ICD-10-CM

## 2025-06-09 DIAGNOSIS — M70.51 PES ANSERINUS BURSITIS OF BOTH KNEES: Primary | ICD-10-CM

## 2025-06-09 DIAGNOSIS — G89.29 CHRONIC PAIN OF BOTH KNEES: ICD-10-CM

## 2025-06-09 DIAGNOSIS — M25.561 CHRONIC PAIN OF BOTH KNEES: ICD-10-CM

## 2025-06-09 DIAGNOSIS — M70.52 PES ANSERINUS BURSITIS OF BOTH KNEES: Primary | ICD-10-CM

## 2025-06-09 PROCEDURE — 97110 THERAPEUTIC EXERCISES: CPT

## 2025-06-09 PROCEDURE — 97161 PT EVAL LOW COMPLEX 20 MIN: CPT

## 2025-06-09 NOTE — PROGRESS NOTES
PT Evaluation     Today's date: 2025  Patient name: Stormy Smith  : 1980  MRN: [id number]  Referring provider: Shahram Rice *  Dx:   Encounter Diagnosis     ICD-10-CM    1. Pes anserinus bursitis of both knees  M70.51     M70.52       2. Chronic pain of both knees  M25.561     M25.562     G89.29           Start Time: 1645  Stop Time: 1730  Total time in clinic (min): 45 minutes    Assessment  Impairments: abnormal coordination, abnormal gait, abnormal or restricted ROM, abnormal movement, activity intolerance, impaired physical strength, lacks appropriate home exercise program, pain with function, weight-bearing intolerance, poor posture , poor body mechanics, participation limitations, activity limitations and endurance  Symptom irritability: moderate    Assessment details: Stormy Smith is a pleasant 44 y.o. female with a referred dx of pes anserinus bursitis of both knees [facility].  No further referral appears necessary at this time based upon examination results. Upon further clinical testing, patient presents with the following deficits: active motor dysfunction, decrease joint mobility, tenderness to palpation along medial compartment of B/L knee (R > L), gait deficits, and decrease knee/hip strength. These deficits and impairments result in poor weight bearing tolerance especially with walking and stair climbing (15-20min), bending the knee, and STS transfers after sitting for too long. Patient wishes to be able to begin fitness training but wants to make sure knees won't be aggravated. Pt was provided with a basic HEP focused on graded exposure and LE strength which will be reviewed in the upcoming session. Pt able to demonstrate HEP with good technique and no pain. Educated pt to stop any exercises causing pain increase, pt verbalizes understanding. Pt was educated on anatomy and physiology of diagnosis and demonstrated verbal understanding. Positive prognostic  indicators include absence of peripheralization and absence of observed red flags. Negative prognostic indicators include chronicity of symptoms, anxiety, high symptom irritability, and obesity. Pt would benefit from skilled OP physical therapy to address these, and the below impairments in order to optimize outcomes and promote return to functional baseline.     Patient verbalized understanding of POC.    Please contact me if you have any questions or recommendations. Thank you for the referral and the opportunity to share in Stormy's care      Understanding of Dx/Px/POC: good     Prognosis: fair    Goals  Short Term Goals:  In 4-6 weeks, the patient will:  1. Pt will report at least a 25% reduction in subjective pain complaints/symptoms to better manage ADLs and household chores.   2. Pt will improve atleast a half a grade more on LE MMT  3. Pt independent with initial HEP, rationale, technique and frequency, for ROM and pain control.    Long Term Goals:  In 12+ weeks, the patient will:  1. Pt will have atleast % improvement in knee ROM and 5/5 on graded LE MMT  2. FOTO to greater than predicted value.  3. Independent with comprehensive HEP upon discharge.  4. Pt will be able to perform ADLs, iADLS, and household duties with minimal restriction indicating return to PLOF.  5. Pt independent with rationale, technique and plan for performance of advanced HEP to ensure independent self-management of symptoms upon discharge.  6. Pt will be able to successfully begin fitness at gym    Plan  Patient would benefit from: PT eval, skilled physical therapy and home program  Referral necessary: No  Planned modality interventions: cryotherapy, biofeedback and TENS    Planned therapy interventions: activity modification, ADL retraining, joint mobilization, manual therapy, massage, balance, balance/weight bearing training, behavior modification, body mechanics training, muscle pump exercises, motor coordination training,  nerve gliding, neuromuscular re-education, patient education, postural training, community reintegration, self care, sensory integrative techniques, strengthening, stretching, therapeutic activities, therapeutic exercise, therapeutic training, home exercise program, graded motor, graded exercise, graded activity, functional ROM exercises, gait training, abdominal trunk stabilization, IASTM, patient/caregiver education and transfer training    Frequency: 2x week  Plan of Care beginning date: 2025  Plan of Care expiration date: 2025  Treatment plan discussed with: patient        Subjective Evaluation    History of Present Illness  Mechanism of injury: IE  : Patient presents for PT initial evaluation regarding concerns of acute on chronic bilateral medial knee pain. Patient was referred by PCP after receiving bilateral CSI. No SOURAV, but was walking in grocery store. Patient states pain and swelling begins in the medial aspect of knee and radiates superiorly of patellar.     Denies numbness and tingling. Denies any recent infection.     Denies any red flags which include: fever, chills, chest pain, focal neurologic deficits, urinary distention, urinary incontinence, fecal incontinence, saddle anesthesia.    Aggravating Factors: walking, standing (15-20 min), bending the knee to go up the stairs, sitting for too long and coming stance,  Relieving Factors: CSI, Voltaren gel, emu oil, KT tape, Tylenol     Personal Functional Goals: return to exercise             Not a recurrent problem   Quality of life: good    Patient Goals  Patient goals for therapy: increased strength, independence with ADLs/IADLs, return to work, increased motion and decreased pain    Pain  Current pain ratin  At best pain ratin  At worst pain rating: 10  Location: B/L Knee  Quality: sharp, dull ache, discomfort and tight  Relieving factors: change in position  Aggravating factors: standing, walking and stair climbing    Social  Support  Lives with: spouse    Employment status: working  Exercise history: Sedentary      Diagnostic Tests  No diagnostic tests performed  Treatments  Previous treatment: medication and injection treatment  Current treatment: physical therapy        Objective      Standing Posture & Lower Extremity Alignment:  Structure/Joint         Knee - Frontal  x Genuvalgum  Neutral  Genuvarum   Knee - Sagittal  Genurecurvatum x Neutral     Rearfoot x Valgus  Neutral  Varus   Forefoot x Abducted  Neutral     Arch x Pes Planus  Neutral  Pes Cavus     Range of Motion: Goniometric revealed the following findings (in degrees).  Joint Motion Right: 6/9/2025 Left: 6/9/2025   Knee Extension 0 0   Knee Flexion 110 110   Ankle Dorsiflexion WFL WFL   Ankle Plantarflexion WFL WFL     Strength: MMT revealed the following findings.  Joint Motion Right: 6/9/2025 Left: 6/9/2025   Hip Flexion 4/5 4/5   Knee Extension 5/5 5/5   Knee Flexion 5/5 5/5   Ankle Plantarflexion 4/5 4/5   Ankle Dorsiflexion 4/5 4/5     Additional Assessments:  Palpation: tenderness to palpation along B/L medial joint and adductor insertion and R patellar tendon  Joint Mobility: WNL    Dermatomes: intact  Myotomes: intact         Precautions: Status post laparoscopic hernia repair 11/2024,     POC expires Unit limit Auth Expiration date PT/OT + Visit Limit?   8/4/25 bomn 12/31/25 bomn              Pertinent Findings:                                                                                             Test / Measure  6/9/25   FOTO (Predicted 66 ) 57                 Access Code: [email or web address]  URL: [email or web address]  Date: 06/09/2025  Prepared by: Karla Martinez    Exercises  - Supine Active Straight Leg Raise  - 1 x daily - 2-3 x weekly - 2-3 sets - 6-8 reps  - Supine Bridge  - 1 x daily - 2-3 x weekly - 2-3 sets - 5 reps  - Seated Knee Extension with Resistance  - 1 x daily - 2-3 x weekly - 2-3 sets - 10 reps    Manuals 6/9             Medial Knee  IASTM                                                    Neuro Re-Ed             Bridge             Hooklying Clamshells             Hookyling Hip Adduction                                                                 Ther Ex             HEP/Patient Education  AR performed              NuStep             Active SLR             LAQ with Loop Band             HS Curl             Seated Heel Raises             STS                                                                 Ther Activity             FWD Step-Ups             LAT Step-Ups             Gait Training                                       Modalities

## 2025-06-16 ENCOUNTER — OFFICE VISIT (OUTPATIENT)
Dept: PHYSICAL THERAPY | Facility: REHABILITATION | Age: 45
End: 2025-06-16
Attending: FAMILY MEDICINE
Payer: COMMERCIAL

## 2025-06-16 DIAGNOSIS — G89.29 CHRONIC PAIN OF BOTH KNEES: ICD-10-CM

## 2025-06-16 DIAGNOSIS — M25.562 CHRONIC PAIN OF BOTH KNEES: ICD-10-CM

## 2025-06-16 DIAGNOSIS — M25.561 CHRONIC PAIN OF BOTH KNEES: ICD-10-CM

## 2025-06-16 DIAGNOSIS — M70.52 PES ANSERINUS BURSITIS OF BOTH KNEES: Primary | ICD-10-CM

## 2025-06-16 DIAGNOSIS — M70.51 PES ANSERINUS BURSITIS OF BOTH KNEES: Primary | ICD-10-CM

## 2025-06-16 DIAGNOSIS — E66.01 MORBID (SEVERE) OBESITY DUE TO EXCESS CALORIES (HCC): Primary | ICD-10-CM

## 2025-06-16 PROCEDURE — 97110 THERAPEUTIC EXERCISES: CPT

## 2025-06-16 RX ORDER — TIRZEPATIDE 5 MG/.5ML
5 INJECTION, SOLUTION SUBCUTANEOUS WEEKLY
Qty: 2 ML | Refills: 2 | Status: SHIPPED | OUTPATIENT
Start: 2025-06-16 | End: 2025-06-20 | Stop reason: SDUPTHER

## 2025-06-16 NOTE — PROGRESS NOTES
"Daily Note     Today's date: 2025  Patient name: Stormy Smith  : 1980  MRN: [id number]  Referring provider: Shahram Rice*  Dx:   Encounter Diagnosis     ICD-10-CM    1. Pes anserinus bursitis of both knees  M70.51     M70.52       2. Chronic pain of both knees  M25.561     M25.562     G89.29           Start Time: 1715  Stop Time: 1800  Total time in clinic (min): 45 minutes    Subjective: She is doing well, had been performing her HEP with no issues.       Objective: See treatment diary below      Assessment: Patient tolerated treatment session well today with focus on graded LE strengthening. Patient performed all exercises well with no increase pain. Had slight tenderness with STM. Patient will continue to be appropriate for skilled outpatient physical therapy in order to address impairments. 1:1 with Karla Martinez, PT, DPT for entirety of treatment session.      Plan: Continue per plan of care.      Precautions: Status post laparoscopic hernia repair 2024,     POC expires Unit limit Auth Expiration date PT/OT + Visit Limit?   25 bomn 25 bomn              Pertinent Findings:                                                                                             Test / Measure  25   FOTO (Predicted 66 ) 57                 Access Code: [email or web address]  URL: [email or web address]  Date: 2025  Prepared by: Karla Martinez    Exercises  - Supine Active Straight Leg Raise  - 1 x daily - 2-3 x weekly - 2-3 sets - 6-8 reps  - Supine Bridge  - 1 x daily - 2-3 x weekly - 2-3 sets - 5 reps  - Seated Knee Extension with Resistance  - 1 x daily - 2-3 x weekly - 2-3 sets - 10 reps    Manuals            Medial Knee IASTM  AR                                                  Neuro Re-Ed             Bridge  nv           Hooklying Clamshells  BTB  2x10           Hookyling Hip Adduction  To ball  5\"x20                                                               Ther Ex    "          HEP/Patient Education  AR performed              NuStep  Seat: 11  Arm: 10  10' L5           Active SLR  #2  2x8  B/L           LAQ with Loop Band  Thin blk loop  15x  B/L           HS Curl             Standing Heel Raise on foam  15x           STS  1 foam  3x5                                                               Ther Activity             FWD Step-Ups             LAT Step-Ups             Gait Training                                       Modalities

## 2025-06-18 ENCOUNTER — APPOINTMENT (OUTPATIENT)
Dept: PHYSICAL THERAPY | Facility: REHABILITATION | Age: 45
End: 2025-06-18
Attending: FAMILY MEDICINE
Payer: COMMERCIAL

## 2025-06-20 DIAGNOSIS — F41.9 ANXIETY: ICD-10-CM

## 2025-06-20 DIAGNOSIS — E66.01 MORBID (SEVERE) OBESITY DUE TO EXCESS CALORIES (HCC): ICD-10-CM

## 2025-06-20 RX ORDER — TIRZEPATIDE 5 MG/.5ML
5 INJECTION, SOLUTION SUBCUTANEOUS WEEKLY
Qty: 2 ML | Refills: 2 | Status: SHIPPED | OUTPATIENT
Start: 2025-06-20

## 2025-06-20 RX ORDER — TRAZODONE HYDROCHLORIDE 50 MG/1
50 TABLET ORAL
Qty: 90 TABLET | Refills: 1 | Status: SHIPPED | OUTPATIENT
Start: 2025-06-20

## 2025-06-23 ENCOUNTER — OFFICE VISIT (OUTPATIENT)
Dept: PHYSICAL THERAPY | Facility: REHABILITATION | Age: 45
End: 2025-06-23
Attending: FAMILY MEDICINE
Payer: COMMERCIAL

## 2025-06-23 DIAGNOSIS — M25.561 CHRONIC PAIN OF BOTH KNEES: ICD-10-CM

## 2025-06-23 DIAGNOSIS — M70.52 PES ANSERINUS BURSITIS OF BOTH KNEES: Primary | ICD-10-CM

## 2025-06-23 DIAGNOSIS — G89.29 CHRONIC PAIN OF BOTH KNEES: ICD-10-CM

## 2025-06-23 DIAGNOSIS — M70.51 PES ANSERINUS BURSITIS OF BOTH KNEES: Primary | ICD-10-CM

## 2025-06-23 DIAGNOSIS — M25.562 CHRONIC PAIN OF BOTH KNEES: ICD-10-CM

## 2025-06-23 PROCEDURE — 97140 MANUAL THERAPY 1/> REGIONS: CPT

## 2025-06-23 PROCEDURE — 97110 THERAPEUTIC EXERCISES: CPT

## 2025-06-23 PROCEDURE — 97112 NEUROMUSCULAR REEDUCATION: CPT

## 2025-06-23 NOTE — PROGRESS NOTES
Daily Note     Today's date: 2025  Patient name: Stormy Smith  : 1980  MRN: [id number]  Referring provider: Shahram Rice*  Dx:   Encounter Diagnosis     ICD-10-CM    1. Pes anserinus bursitis of both knees  M70.51     M70.52       2. Chronic pain of both knees  M25.561     M25.562     G89.29           Start Time: 1715  Stop Time: 1808  Total time in clinic (min): 53 minutes    Subjective: She is doing well. Notes improvement in her knee pain. She has been walking around her house more and started to use her [employer] bike, trialed 20 mins with no issues.      Objective: See treatment diary below      Assessment: Patient tolerated treatment session well today with focus on graded LE strengthening. Patient performed all exercises well with no increase pain. She had slight increase challenge with standing exercise progressions. Continue HEP as carter. Patient will continue to be appropriate for skilled outpatient physical therapy in order to address impairments. 1:1 with Karla Martinez, PT, DPT for entirety of treatment session.      Plan: Continue per plan of care.      Precautions: Status post laparoscopic hernia repair 2024,     POC expires Unit limit Auth Expiration date PT/OT + Visit Limit?   25 bomn 25 bomn              Pertinent Findings:                                                                                             Test / Measure  25   FOTO (Predicted 66 ) 57                 Access Code: [email or web address]  URL: [email or web address]  Date: 2025  Prepared by: Karla Martinez    Exercises  - Supine Active Straight Leg Raise  - 1 x daily - 2-3 x weekly - 2-3 sets - 6-8 reps  - Supine Bridge  - 1 x daily - 2-3 x weekly - 2-3 sets - 5 reps  - Seated Knee Extension with Resistance  - 1 x daily - 2-3 x weekly - 2-3 sets - 10 reps    Manuals           Medial Knee IASTM  AR AR                                                 Neuro Re-Ed             Bridge  " nv 3x6          Hooklying Clamshells  BTB  2x10 BTB  3x10          Hookyling Hip Adduction  To ball  5\"x20 dc                                                              Ther Ex             HEP/Patient Education  AR performed              NuStep  Seat: 11  Arm: 10  10' L5 8' L5          Active SLR  #2  2x8  B/L #3  2x8  B/L          LAQ with Loop Band  Thin blk loop  15x  B/L Thin blk loop  20x  B/L          HS Curl   BTB  20x  B/L          Standing Heel Raise on foam  15x 20x          STS  1 foam  3x5 1 foam  3x8          Standing Hip Abduction on foam   2x10  B/L                                                 Ther Activity             FWD Step-Ups             LAT Step-Ups             Gait Training                                       Modalities                                            "

## 2025-06-25 ENCOUNTER — OFFICE VISIT (OUTPATIENT)
Dept: PHYSICAL THERAPY | Facility: REHABILITATION | Age: 45
End: 2025-06-25
Attending: FAMILY MEDICINE
Payer: COMMERCIAL

## 2025-06-25 DIAGNOSIS — M70.51 PES ANSERINUS BURSITIS OF BOTH KNEES: Primary | ICD-10-CM

## 2025-06-25 DIAGNOSIS — M25.561 CHRONIC PAIN OF BOTH KNEES: ICD-10-CM

## 2025-06-25 DIAGNOSIS — M25.562 CHRONIC PAIN OF BOTH KNEES: ICD-10-CM

## 2025-06-25 DIAGNOSIS — M70.52 PES ANSERINUS BURSITIS OF BOTH KNEES: Primary | ICD-10-CM

## 2025-06-25 DIAGNOSIS — G89.29 CHRONIC PAIN OF BOTH KNEES: ICD-10-CM

## 2025-06-25 PROCEDURE — 97110 THERAPEUTIC EXERCISES: CPT

## 2025-06-25 PROCEDURE — 97140 MANUAL THERAPY 1/> REGIONS: CPT

## 2025-06-25 NOTE — PROGRESS NOTES
Daily Note     Today's date: 2025  Patient name: Stormy Smith  : 1980  MRN: [id number]  Referring provider: Shahram Rice*  Dx:   Encounter Diagnosis     ICD-10-CM    1. Pes anserinus bursitis of both knees  M70.51     M70.52       2. Chronic pain of both knees  M25.561     M25.562     G89.29             Start Time: 1715  Stop Time: 1800  Total time in clinic (min): 45 minutes    Subjective: She continues to respond very well to treatment with minimal to no pain.      Objective: See treatment diary below      Assessment: Patient tolerated treatment session well today with focus on graded LE strengthening. Patient performed all exercises well with no increase pain. She had slight increase challenge with standing exercise progressions. Continue HEP as carter. Patient will continue to be appropriate for skilled outpatient physical therapy in order to address impairments. 1:1 with Karla Martinez, PT, DPT for entirety of treatment session.      Plan: Potential discharge     Precautions: Status post laparoscopic hernia repair 2024,     POC expires Unit limit Auth Expiration date PT/OT + Visit Limit?   25 bomn 25 bomn              Pertinent Findings:                                                                                             Test / Measure  25   FOTO (Predicted 66 ) 57                 Access Code: [email or web address]  URL: [email or web address]  Date: 2025  Prepared by: Karla Martinez    Exercises  - Supine Active Straight Leg Raise  - 1 x daily - 2-3 x weekly - 2-3 sets - 6-8 reps  - Supine Bridge  - 1 x daily - 2-3 x weekly - 2-3 sets - 5 reps  - Seated Knee Extension with Resistance  - 1 x daily - 2-3 x weekly - 2-3 sets - 10 reps    Manuals          Medial Knee IASTM  AR AR AR                                                Neuro Re-Ed             Bridge  nv 3x6 3x6         Hooklying Clamshells  BTB  2x10 BTB  3x10          Hookyling Hip  "Adduction  To ball  5\"x20 dc                                                              Ther Ex             HEP/Patient Education  AR performed              NuStep  Seat: 11  Arm: 10  10' L5 8' L5 8' L5         Active SLR  #2  2x8  B/L #3  2x8  B/L #3  2x8  B/L         LAQ with Loop Band  Thin blk loop  15x  B/L Thin blk loop  20x  B/L          HS Curl   BTB  20x  B/L          Standing Heel Raise on foam  15x 20x 25x         STS  1 foam  3x5 1 foam  3x8 1 foam  3x8         Standing Hip Abduction on foam   2x10  B/L 2x10  B/L         Standing Hip Adduction    #5  20x  B/L                                   Ther Activity             FWD Step-Ups    6\"  12x  B/L         Eccentric Lateral Step Down     6\"  15x         Gait Training                                       Modalities                                            "

## 2025-06-30 ENCOUNTER — OFFICE VISIT (OUTPATIENT)
Dept: PHYSICAL THERAPY | Facility: REHABILITATION | Age: 45
End: 2025-06-30
Attending: FAMILY MEDICINE
Payer: COMMERCIAL

## 2025-06-30 DIAGNOSIS — M70.52 PES ANSERINUS BURSITIS OF BOTH KNEES: Primary | ICD-10-CM

## 2025-06-30 DIAGNOSIS — M70.51 PES ANSERINUS BURSITIS OF BOTH KNEES: Primary | ICD-10-CM

## 2025-06-30 DIAGNOSIS — M25.561 CHRONIC PAIN OF BOTH KNEES: ICD-10-CM

## 2025-06-30 DIAGNOSIS — G89.29 CHRONIC PAIN OF BOTH KNEES: ICD-10-CM

## 2025-06-30 DIAGNOSIS — M25.562 CHRONIC PAIN OF BOTH KNEES: ICD-10-CM

## 2025-06-30 PROCEDURE — 97110 THERAPEUTIC EXERCISES: CPT

## 2025-06-30 NOTE — PROGRESS NOTES
Daily Note     Today's date: 2025  Patient name: Stormy Smith  : 1980  MRN: [id number]  Referring provider: Shahram Rice*  Dx:   Encounter Diagnosis     ICD-10-CM    1. Pes anserinus bursitis of both knees  M70.51     M70.52       2. Chronic pain of both knees  M25.561     M25.562     G89.29             Start Time: 1715  Stop Time: 1750  Total time in clinic (min): 35 minutes      Subjective: She continues to respond very well to treatment with minimal to no pain. She was able to perform all the current HEP with no issues. Yesterday did a lot of walking at [employer] store, tightness behind the the knee and trouble bending right after. It didn't last as long and was able to relieve with exercises.       Objective: See treatment diary below      Assessment: Patient tolerated last treatment session well today with focus on graded LE strengthening. Patient performed all exercises well with no increase pain. She had slight increase challenge with standing exercise progressions, but responded well. Patient comes today with no significant concerns at this time. Pt able to demonstrate updated HEP with good technique and no pain. Educated pt to stop any exercises causing pain increase, pt verbalizes understanding. Due to patient overall feeling better and has met majority of her goals, will be appropriate for discharge. She may return to PT at any time if pain increases or notes functional regression.         Plan: Potential discharge     Precautions: Status post laparoscopic hernia repair 2024,     POC expires Unit limit Auth Expiration date PT/OT + Visit Limit?   25 bomn 25 bomn              Pertinent Findings:                                                                                             Test / Measure  25   FOTO (Predicted 66 ) 57                     Access Code: [email or web address]  URL: [email or web address]  Date: 2025  Prepared by: Karla  "Martinez    Exercises  - Supine Active Straight Leg Raise  - 1 x daily - 2-3 x weekly - 2-3 sets - 6-8 reps  - Supine Bridge  - 1 x daily - 2-3 x weekly - 2-3 sets - 5 reps  - Squat with Chair Touch  - 1 x daily - 7 x weekly - 3 sets - 6-8 reps  - Seated Knee Extension with Resistance  - 1 x daily - 2-3 x weekly - 2-3 sets - 10 reps  - Side Stepping with Resistance at Ankles  - 1 x daily - 2-3 x weekly - 2 sets - 10 reps  - Standing Hip Extension with Resistance at Ankles and Counter Support  - 1 x daily - 2-3 x weekly - 2-3 sets - 10 reps  - Runner's Step Up on BOSU® Ball  - 1 x daily - 2-3 x weekly - 2 sets - 10 reps    Manuals 6/9 6/16 6/23 6/25 6/30        Medial Knee IASTM  AR AR AR                                                Neuro Re-Ed             Bridge  nv 3x6 3x6         Hooklying Clamshells  BTB  2x10 BTB  3x10          Hookyling Hip Adduction  To ball  5\"x20 dc                                                              Ther Ex             HEP/Patient Education  AR performed      Performed        NuStep  Seat: 11  Arm: 10  10' L5 8' L5 8' L5 8' L5        Active SLR  #2  2x8  B/L #3  2x8  B/L #3  2x8  B/L         LAQ with Loop Band  Thin blk loop  15x  B/L Thin blk loop  20x  B/L          HS Curl   BTB  20x  B/L          Standing Heel Raise on foam  15x 20x 25x         STS  1 foam  3x5 1 foam  3x8 1 foam  3x8         Standing Hip Abduction on foam   2x10  B/L 2x10  B/L         Standing Hip Adduction    #5  20x  B/L                                   Ther Activity             FWD Step-Ups    6\"  12x  B/L         Eccentric Lateral Step Down     6\"  15x         Gait Training                                       Modalities                                            "

## 2025-07-08 ENCOUNTER — CLINICAL SUPPORT (OUTPATIENT)
Dept: BARIATRICS | Facility: CLINIC | Age: 45
End: 2025-07-08

## 2025-07-08 VITALS — HEIGHT: 65 IN | BODY MASS INDEX: 48.82 KG/M2 | WEIGHT: 293 LBS

## 2025-07-08 DIAGNOSIS — Z98.84 H/O GASTRIC BYPASS: Primary | ICD-10-CM

## 2025-07-08 DIAGNOSIS — R63.5 ABNORMAL WEIGHT GAIN: Primary | ICD-10-CM

## 2025-07-08 PROCEDURE — RECHECK: Performed by: DIETITIAN, REGISTERED

## 2025-07-08 PROCEDURE — RECHECK

## 2025-07-08 NOTE — PROGRESS NOTES
"Bariatric Follow Up Nutrition Note  -referred by surgical CRNP, on zepbound 5 mg (on 2nd week), denies side effects (reports starting weight was 304# at home)    -largest impact of GLP-1 is decreased food noise/stress/boredom eating    - had bowel sx in 2021 which contributed to weight increases    Type of surgery  Gastric bypass: laparoscopic  Surgery Date: 2012  13 years  post-op  Surgeon: Ean Surgeons: [facility] 2012     Nutrition Assessment   Stormy Smith  44 y.o.  female  Ht 5' 5\" (1.651 m)   Wt 135 kg (297 lb 6.4 oz)   BMI 49.49 kg/m²     [facility] Equation:    BMR= 1990  Estimated calories for weight maintenance:  2388  ( sedentary  )   Estimated calories for weight loss 7493-5817 ( 1-2# per wk wt loss - sedentary )  Estimated protein needs 67-101g (1.0-1.5 gms/kg IBW )   Estimated fluids: 127 oz total   Fluid Requirement Calculator  Estimated free fluid 101  oz free fluid (Hilary-Segar method-20%)     Highest weight 382 lbs  Current Weight 304 LBS   Ramez 240 lbs (2018)  Goal - 170 LBS   5% weight loss - 15.2 LBS (288.8 LBS )  20% weight loss - 60.8 lbs (243.2 lbs)    Review of History and Medications   Past Medical History[1]  Past Surgical History[2]  Social History[3]  Current Medications[4]    Food Intake and Lifestyle Assessment   Food Intake Assessment completed via usual diet recall started using [employer] to track, averaging 1800 kcals, 89g protein  Wakes 6a  Bed: 9-9:30p  -takes trazadone to help with sleep (mind races)    7:30a Breakfast: protein shake (blueberries, oats, coconut milk, whey protein isolate 20g protein)  Snack: coffee (chobani creamer 2 tbsp)   12-12:30p Lunch: salad with chicken or protein pizza bowl (riocotta greek yogurt, sauce, cheese)  Snack: greek yogurt (25g protein yogurt)  5-5:30p Dinner: sweet potato, chicken/fish  Snack: [employer] dark chocolate bar  -weekend mornings are the same but may  a meal while out shopping    Beverage intake: 40-60 oz of " water, coffee - chobani creamer   Alcohol: none since started zepbound   Diet texture/stage: regular  Protein supplement: whey protein isolate  Estimated protein intake per day: 75-80g  Estimated fluid intake per day: 40-60oz water, 8oz coffee  Meals eaten away from home: 1x/week  Typical meal pattern: 3 meals per day and 2 snacks per day  Eating Behaviors: Appropriate portion sizes, Does not drink with meals and waits 30-minutes after meal before resuming drinking, Mindless eating, Emotional eating, and Craves sweet foods   Used to crave sweets- zepbound is helping with  Food allergies or intolerances: eggs can be hit or miss  Cultural or Anabaptism considerations:     Vitamin and Mineral regimen bariatric vitamin, along with viactiv 2 chews.   vitamin D3 to 7878-7370 IU DAILY, 9842-6677 mg calcium citrate    Physical Assessment  Nutrition Related Findings  Increased food noise prior to intiation of zepbound    Physical Activity  Types of exercise: recently stopped PT- doing exercises at home 2-3 times (45-60 minutes), stationary bike 20 minutes as part of PT exercises  Current physical limitations: knee pain     Psychosocial Assessment   Support systems: spouse and children  -family has a sweet tooth at home  -she and step daughter share cooking  -patient does grocery shopping  Socioeconomic factors: Occupation: general sx /MA     Nutrition Diagnosis  Diagnosis: Overweight / Obesity (NC-3.3)  Related to: Physical inactivity and Excessive energy intake  As Evidenced by: BMI >25     Nutrition Prescription: Recommend the following diet  8480-6548 calories per day with 90-100g protein and 100 oz fluid    Interventions and Teaching   Patient educated on post-op nutrition guidelines.    Provided patient with bariatric manual    Patient educated and handouts provided.  Adequate hydration  Expected weight loss  Weight loss plateaus/ possibility of weight regain  Exercise  Nutrition considerations after  surgery  Protein supplements  Meal planning and preparation  Appropriate carbohydrate, protein, and fat intake, and food/fluid choices to maximize safe weight loss, nutrient intake, and tolerance   Dietary and lifestyle changes  Possible problems with poor eating habits  Intuitive eating  Techniques for self monitoring and keeping daily food journal  Potential for food intolerance after surgery, and ways to deal with them including: lactose intolerance, nausea, reflux, vomiting, diarrhea, food intolerance, appetite changes, gas  Vitamin / Mineral supplementation of Multivitamin with minerals, Calcium, Vitamin B12, Iron, Fat Soluble vitamins, and Vitamin D    Patient is not currently pregnant and doesn't desire to become pregnant a minimum of one year post-op    Education provided to: patient    Barriers to learning: No barriers identified    Readiness to change: action    Comprehension: verbalizes understanding     Expected Compliance: good    Evaluation/Monitoring   Eating pattern as discussed Tolerance of nutrition prescription Body weight Lab values Physical activity Bowel pattern    Goals  1. Consider increasing protein content of shake in AM  2. Add a mid morning snack with protein  3. Increase fluids to 60-80 oz than 80-100oz per day     Time Spent:   30 Minutes          [1]   Past Medical History:  Diagnosis Date    Abdominal pain 12/26/2011    ALSO 1/25/2013    Allergic     Yes    Anxiety 6/2023    Arthritis     Knees    Back pain 08/2007    Chest pain 12/2011 12/2011 ER visit. 12/27/01 Hospitalization    Foot pain 11/2007    PLANTAR FASCITIS    Gastro-esophageal reflux 2012    GERD (gastroesophageal reflux disease)     Heel pain 04/23/2008    LT    Kidney stone 2010    Miscarriage 2008    Obesity     MORBID WITH BMI OF 58    Pre-diabetes     Rectal bleeding     Diarrhea    Small bowel obstruction (HCC) 4/16/2021   [2]   Past Surgical History:  Procedure Laterality Date    CHOLECYSTECTOMY      COLONOSCOPY   2012    ESOPHAGOGASTRODUODENOSCOPY  2012    GASTRIC BYPASS  2012    Morbid Obesity    HERNIA REPAIR      NE RPR AA HERNIA 1ST 3-10 CM REDUCIBLE N/A 2024    Procedure: REPAIR HERNIA INCISIONAL LAPAROSCOPIC;  Surgeon: Henry Lovelace MD;  Location: [facility];  Service: General    SMALL INTESTINE SURGERY      Scar tissue removed from small intestine upper left   [3]   Social History  Socioeconomic History    Marital status: /Civil Union   Tobacco Use    Smoking status: Former     Current packs/day: 0.00     Types: Cigarettes     Quit date: 2004     Years since quittin.0     Passive exposure: Past    Smokeless tobacco: Never    Tobacco comments:     USED CHANTIX TO QUIT SMOKING . Per [employer] 10/4/16 Never a smoker   Vaping Use    Vaping status: Never Used   Substance and Sexual Activity    Alcohol use: Yes     Alcohol/week: 1.0 standard drink of alcohol     Types: 1 Glasses of wine per week     Comment: social    Drug use: No    Sexual activity: Yes     Partners: Male     Birth control/protection: None   Social History Narrative    [employer] NEVER SMOKER   [4]   Current Outpatient Medications:     albuterol (ProAir HFA) 90 mcg/act inhaler, Inhale 2 puffs every 6 (six) hours as needed for wheezing, Disp: 8.5 g, Rfl: 3    cyclobenzaprine (FLEXERIL) 5 mg tablet, Take 1 tablet (5 mg total) by mouth daily at bedtime as needed for muscle spasms, Disp: 60 tablet, Rfl: 1    Diclofenac Sodium (VOLTAREN) 1 %, Apply 2 g topically 4 (four) times a day, Disp: 1 Tube, Rfl: 0    fluticasone (FLONASE) 50 mcg/act nasal spray, 2 sprays into each nostril as needed, Disp: , Rfl:     loratadine (CLARITIN) 10 mg tablet, Take 10 mg by mouth in the morning., Disp: , Rfl:     Multiple Vitamin (multivitamin) capsule, Take 1 capsule by mouth in the morning., Disp: , Rfl:     Multiple Vitamins-Minerals (MULTIVITAMIN ADULT) TABS, Take 1 tablet by mouth in the morning., Disp: , Rfl:     tirzepatide  (Zepbound) 5 mg/0.5 mL auto-injector, Inject 0.5 mL (5 mg total) under the skin once a week, Disp: 2 mL, Rfl: 2    traZODone (DESYREL) 50 mg tablet, Take 1 tablet (50 mg total) by mouth daily at bedtime, Disp: 90 tablet, Rfl: 1

## 2025-07-08 NOTE — PROGRESS NOTES
Gastric Bypass with Dr. Bray at [facility] in 2012     Zepbound 5mg     for [facility]  -- involves movement.   PT for her knee.  Cycling stationary bike.    Initial: 382 lbs   Current: 297 lbs   Ramez: 240 lbs   Goal - 170 lbs

## 2025-07-16 DIAGNOSIS — E66.01 MORBID (SEVERE) OBESITY DUE TO EXCESS CALORIES (HCC): ICD-10-CM

## 2025-07-16 RX ORDER — TIRZEPATIDE 5 MG/.5ML
5 INJECTION, SOLUTION SUBCUTANEOUS WEEKLY
Qty: 2 ML | Refills: 0 | OUTPATIENT
Start: 2025-07-16

## 2025-07-24 NOTE — TELEPHONE ENCOUNTER
Patient called requesting refill for Zepbound. Patient made aware medication was refilled on 6/20/25 for 2 with 2 refills to [facility]  pharmacy. Patient instructed to contact the pharmacy and speak with someone directly to obtain refills of medication. Patient advised to call back for refill if their pharmacy is unable to assist them. Patient verbalized understanding.

## 2025-08-19 ENCOUNTER — APPOINTMENT (OUTPATIENT)
Dept: LAB | Facility: CLINIC | Age: 45
End: 2025-08-19
Attending: PREVENTIVE MEDICINE
Payer: COMMERCIAL

## 2025-08-19 ENCOUNTER — APPOINTMENT (OUTPATIENT)
Dept: LAB | Facility: CLINIC | Age: 45
End: 2025-08-19
Attending: NURSE PRACTITIONER
Payer: COMMERCIAL

## 2025-08-19 DIAGNOSIS — Z98.84 BARIATRIC SURGERY STATUS: ICD-10-CM

## 2025-08-19 DIAGNOSIS — Z00.8 HEALTH EXAMINATION IN POPULATION SURVEY: ICD-10-CM

## 2025-08-19 DIAGNOSIS — G47.33 OSA (OBSTRUCTIVE SLEEP APNEA): ICD-10-CM

## 2025-08-19 DIAGNOSIS — Z48.815 ENCOUNTER FOR SURGICAL AFTERCARE FOLLOWING SURGERY OF DIGESTIVE SYSTEM: ICD-10-CM

## 2025-08-19 DIAGNOSIS — E66.01 MORBID (SEVERE) OBESITY DUE TO EXCESS CALORIES (HCC): ICD-10-CM

## 2025-08-19 LAB
25(OH)D3 SERPL-MCNC: 27.5 NG/ML (ref 30–100)
CHOLEST SERPL-MCNC: 149 MG/DL (ref ?–200)
ERYTHROCYTE [DISTWIDTH] IN BLOOD BY AUTOMATED COUNT: 18.4 % (ref 11.6–15.1)
EST. AVERAGE GLUCOSE BLD GHB EST-MCNC: 103 MG/DL
FERRITIN SERPL-MCNC: 8 NG/ML (ref 30–307)
HBA1C MFR BLD: 5.2 %
HCT VFR BLD AUTO: 41.4 % (ref 34.8–46.1)
HDLC SERPL-MCNC: 59 MG/DL
HGB BLD-MCNC: 13.1 G/DL (ref 11.5–15.4)
IRON SATN MFR SERPL: 17 % (ref 15–50)
IRON SERPL-MCNC: 65 UG/DL (ref 50–212)
LDLC SERPL CALC-MCNC: 77 MG/DL (ref 0–100)
MCH RBC QN AUTO: 27.2 PG (ref 26.8–34.3)
MCHC RBC AUTO-ENTMCNC: 31.6 G/DL (ref 31.4–37.4)
MCV RBC AUTO: 86 FL (ref 82–98)
NONHDLC SERPL-MCNC: 90 MG/DL
PLATELET # BLD AUTO: 306 THOUSANDS/UL (ref 149–390)
PMV BLD AUTO: 12.1 FL (ref 8.9–12.7)
PTH-INTACT SERPL-MCNC: 113.5 PG/ML (ref 12–88)
RBC # BLD AUTO: 4.81 MILLION/UL (ref 3.81–5.12)
TIBC SERPL-MCNC: 373.8 UG/DL (ref 250–450)
TRANSFERRIN SERPL-MCNC: 267 MG/DL (ref 203–362)
TRIGL SERPL-MCNC: 64 MG/DL (ref ?–150)
UIBC SERPL-MCNC: 309 UG/DL (ref 155–355)
VIT B12 SERPL-MCNC: 806 PG/ML (ref 180–914)
WBC # BLD AUTO: 5.86 THOUSAND/UL (ref 4.31–10.16)

## 2025-08-19 PROCEDURE — 82728 ASSAY OF FERRITIN: CPT

## 2025-08-19 PROCEDURE — 82306 VITAMIN D 25 HYDROXY: CPT

## 2025-08-19 PROCEDURE — 83918 ORGANIC ACIDS TOTAL QUANT: CPT

## 2025-08-19 PROCEDURE — 84590 ASSAY OF VITAMIN A: CPT

## 2025-08-19 PROCEDURE — 85027 COMPLETE CBC AUTOMATED: CPT

## 2025-08-19 PROCEDURE — 83540 ASSAY OF IRON: CPT

## 2025-08-19 PROCEDURE — 82607 VITAMIN B-12: CPT

## 2025-08-19 PROCEDURE — 80061 LIPID PANEL: CPT

## 2025-08-19 PROCEDURE — 36415 COLL VENOUS BLD VENIPUNCTURE: CPT

## 2025-08-19 PROCEDURE — 83550 IRON BINDING TEST: CPT

## 2025-08-19 PROCEDURE — 83036 HEMOGLOBIN GLYCOSYLATED A1C: CPT

## 2025-08-19 PROCEDURE — 83970 ASSAY OF PARATHORMONE: CPT

## 2025-08-24 LAB — METHYLMALONATE SERPL-SCNC: 101 NMOL/L (ref 0–378)

## 2025-08-25 LAB — VIT A SERPL-MCNC: 33.1 UG/DL (ref 20.1–62)

## (undated) DEVICE — LAPAROSCOPIC TROCAR SLEEVE/SINGLE USE: Brand: KII® OPTICAL ACCESS SYSTEM

## (undated) DEVICE — GLOVE SRG BIOGEL ORTHOPEDIC 7.5

## (undated) DEVICE — 3M™ IOBAN™ 2 ANTIMICROBIAL INCISE DRAPE 6650EZ: Brand: IOBAN™ 2

## (undated) DEVICE — TRAY FOLEY 16FR URIMETER SURESTEP

## (undated) DEVICE — Device: Brand: OMNICLOSE TROCAR SITE CLOSURE DEVICE

## (undated) DEVICE — METZENBAUM ADTEC SINGLE USE DISSECTING SCISSORS, SHAFT ONLY, MONOPOLAR, CURVED TO LEFT, WORKING LENGTH: 12 1/4", (310 MM), DIAM. 5 MM, INSULATED, DOUBLE ACTION, STERILE, DISPOSABLE, PACKAGE OF 10 PIECES: Brand: AESCULAP

## (undated) DEVICE — SUT VICRYL PLUS 0 UR-6 27IN VCP603H

## (undated) DEVICE — TUBING SMOKE EVAC W/FILTRATION DEVICE PLUMEPORT ACTIV

## (undated) DEVICE — ADHESIVE SKIN HIGH VISCOSITY EXOFIN 1ML

## (undated) DEVICE — GLOVE INDICATOR PI UNDERGLOVE SZ 8 BLUE

## (undated) DEVICE — PACK PBDS LAP CHOLE RF

## (undated) DEVICE — TROCAR: Brand: KII FIOS FIRST ENTRY

## (undated) DEVICE — DRAPE SURGIKIT SADDLE BAG

## (undated) DEVICE — NEEDLE 25G X 1 1/2

## (undated) DEVICE — INTENDED FOR TISSUE SEPARATION, AND OTHER PROCEDURES THAT REQUIRE A SHARP SURGICAL BLADE TO PUNCTURE OR CUT.: Brand: BARD-PARKER SAFETY BLADES SIZE 11, STERILE

## (undated) DEVICE — EXOFIN PRECISION PEN HIGH VISCOSITY TOPICAL SKIN ADHESIVE: Brand: EXOFIN PRECISION PEN, 1G

## (undated) DEVICE — SUT MONOCRYL 4-0 PS-2 18 IN Y496G

## (undated) DEVICE — TROCAR: Brand: KII® SLEEVE

## (undated) DEVICE — INSUFFLATION NEEDLE TO ESTABLISH PNEUMOPERITONEUM.: Brand: INSUFFLATION NEEDLE